# Patient Record
Sex: FEMALE | Race: WHITE | NOT HISPANIC OR LATINO | Employment: OTHER | URBAN - METROPOLITAN AREA
[De-identification: names, ages, dates, MRNs, and addresses within clinical notes are randomized per-mention and may not be internally consistent; named-entity substitution may affect disease eponyms.]

---

## 2017-03-06 ENCOUNTER — GENERIC CONVERSION - ENCOUNTER (OUTPATIENT)
Dept: OTHER | Facility: OTHER | Age: 72
End: 2017-03-06

## 2017-08-02 ENCOUNTER — GENERIC CONVERSION - ENCOUNTER (OUTPATIENT)
Dept: OTHER | Facility: OTHER | Age: 72
End: 2017-08-02

## 2017-09-27 ENCOUNTER — GENERIC CONVERSION - ENCOUNTER (OUTPATIENT)
Dept: OTHER | Facility: OTHER | Age: 72
End: 2017-09-27

## 2017-10-13 ENCOUNTER — ALLSCRIPTS OFFICE VISIT (OUTPATIENT)
Dept: OTHER | Facility: OTHER | Age: 72
End: 2017-10-13

## 2017-10-13 DIAGNOSIS — Z11.59 ENCOUNTER FOR SCREENING FOR OTHER VIRAL DISEASES (CODE): ICD-10-CM

## 2017-10-13 DIAGNOSIS — Z12.11 ENCOUNTER FOR SCREENING FOR MALIGNANT NEOPLASM OF COLON: ICD-10-CM

## 2017-10-13 DIAGNOSIS — R03.0 ELEVATED BLOOD PRESSURE READING WITHOUT DIAGNOSIS OF HYPERTENSION: ICD-10-CM

## 2017-10-13 DIAGNOSIS — E78.5 HYPERLIPIDEMIA: ICD-10-CM

## 2017-10-13 DIAGNOSIS — Z13.21 ENCOUNTER FOR SCREENING FOR NUTRITIONAL DISORDER: ICD-10-CM

## 2017-10-13 DIAGNOSIS — M54.30 SCIATICA: ICD-10-CM

## 2017-10-19 ENCOUNTER — APPOINTMENT (OUTPATIENT)
Dept: LAB | Facility: CLINIC | Age: 72
End: 2017-10-19
Payer: MEDICARE

## 2017-10-19 ENCOUNTER — TRANSCRIBE ORDERS (OUTPATIENT)
Dept: LAB | Facility: CLINIC | Age: 72
End: 2017-10-19

## 2017-10-19 DIAGNOSIS — R03.0 ELEVATED BLOOD PRESSURE READING WITHOUT DIAGNOSIS OF HYPERTENSION: ICD-10-CM

## 2017-10-19 DIAGNOSIS — Z12.11 ENCOUNTER FOR SCREENING FOR MALIGNANT NEOPLASM OF COLON: ICD-10-CM

## 2017-10-19 DIAGNOSIS — Z11.59 ENCOUNTER FOR SCREENING FOR OTHER VIRAL DISEASES (CODE): ICD-10-CM

## 2017-10-19 DIAGNOSIS — Z13.21 ENCOUNTER FOR SCREENING FOR NUTRITIONAL DISORDER: ICD-10-CM

## 2017-10-19 DIAGNOSIS — E78.5 HYPERLIPIDEMIA: ICD-10-CM

## 2017-10-19 LAB
25(OH)D3 SERPL-MCNC: 34 NG/ML (ref 30–100)
ALBUMIN SERPL BCP-MCNC: 3.9 G/DL (ref 3.5–5)
ALP SERPL-CCNC: 61 U/L (ref 46–116)
ALT SERPL W P-5'-P-CCNC: 20 U/L (ref 12–78)
ANION GAP SERPL CALCULATED.3IONS-SCNC: 5 MMOL/L (ref 4–13)
AST SERPL W P-5'-P-CCNC: 16 U/L (ref 5–45)
BACTERIA UR QL AUTO: ABNORMAL /HPF
BASOPHILS # BLD AUTO: 0.05 THOUSANDS/ΜL (ref 0–0.1)
BASOPHILS NFR BLD AUTO: 1 % (ref 0–1)
BILIRUB SERPL-MCNC: 1.79 MG/DL (ref 0.2–1)
BILIRUB UR QL STRIP: NEGATIVE
BUN SERPL-MCNC: 19 MG/DL (ref 5–25)
CALCIUM SERPL-MCNC: 9 MG/DL (ref 8.3–10.1)
CHLORIDE SERPL-SCNC: 107 MMOL/L (ref 100–108)
CHOLEST SERPL-MCNC: 196 MG/DL (ref 50–200)
CLARITY UR: CLEAR
CO2 SERPL-SCNC: 29 MMOL/L (ref 21–32)
COLOR UR: YELLOW
CREAT SERPL-MCNC: 0.81 MG/DL (ref 0.6–1.3)
EOSINOPHIL # BLD AUTO: 0.12 THOUSAND/ΜL (ref 0–0.61)
EOSINOPHIL NFR BLD AUTO: 2 % (ref 0–6)
ERYTHROCYTE [DISTWIDTH] IN BLOOD BY AUTOMATED COUNT: 12.8 % (ref 11.6–15.1)
GFR SERPL CREATININE-BSD FRML MDRD: 72 ML/MIN/1.73SQ M
GLUCOSE P FAST SERPL-MCNC: 98 MG/DL (ref 65–99)
GLUCOSE UR STRIP-MCNC: NEGATIVE MG/DL
HBV SURFACE AB SER-ACNC: 80.59 MIU/ML
HCT VFR BLD AUTO: 41.9 % (ref 34.8–46.1)
HCV AB SER QL: NORMAL
HDLC SERPL-MCNC: 97 MG/DL (ref 40–60)
HEMOCCULT STL QL IA: NEGATIVE
HGB BLD-MCNC: 13.5 G/DL (ref 11.5–15.4)
HGB UR QL STRIP.AUTO: NEGATIVE
HYALINE CASTS #/AREA URNS LPF: ABNORMAL /LPF
KETONES UR STRIP-MCNC: NEGATIVE MG/DL
LDLC SERPL CALC-MCNC: 89 MG/DL (ref 0–100)
LEUKOCYTE ESTERASE UR QL STRIP: ABNORMAL
LYMPHOCYTES # BLD AUTO: 1.01 THOUSANDS/ΜL (ref 0.6–4.47)
LYMPHOCYTES NFR BLD AUTO: 18 % (ref 14–44)
MCH RBC QN AUTO: 30.4 PG (ref 26.8–34.3)
MCHC RBC AUTO-ENTMCNC: 32.2 G/DL (ref 31.4–37.4)
MCV RBC AUTO: 94 FL (ref 82–98)
MONOCYTES # BLD AUTO: 0.34 THOUSAND/ΜL (ref 0.17–1.22)
MONOCYTES NFR BLD AUTO: 6 % (ref 4–12)
NEUTROPHILS # BLD AUTO: 4.14 THOUSANDS/ΜL (ref 1.85–7.62)
NEUTS SEG NFR BLD AUTO: 73 % (ref 43–75)
NITRITE UR QL STRIP: NEGATIVE
NON-SQ EPI CELLS URNS QL MICRO: ABNORMAL /HPF
NRBC BLD AUTO-RTO: 0 /100 WBCS
PH UR STRIP.AUTO: 6 [PH] (ref 4.5–8)
PLATELET # BLD AUTO: 161 THOUSANDS/UL (ref 149–390)
PMV BLD AUTO: 13.4 FL (ref 8.9–12.7)
POTASSIUM SERPL-SCNC: 4.1 MMOL/L (ref 3.5–5.3)
PROT SERPL-MCNC: 7.2 G/DL (ref 6.4–8.2)
PROT UR STRIP-MCNC: NEGATIVE MG/DL
RBC # BLD AUTO: 4.44 MILLION/UL (ref 3.81–5.12)
RBC #/AREA URNS AUTO: ABNORMAL /HPF
SODIUM SERPL-SCNC: 141 MMOL/L (ref 136–145)
SP GR UR STRIP.AUTO: 1.02 (ref 1–1.03)
TRIGL SERPL-MCNC: 52 MG/DL
TSH SERPL DL<=0.05 MIU/L-ACNC: 1.4 UIU/ML (ref 0.36–3.74)
UROBILINOGEN UR QL STRIP.AUTO: 0.2 E.U./DL
WBC # BLD AUTO: 5.67 THOUSAND/UL (ref 4.31–10.16)
WBC #/AREA URNS AUTO: ABNORMAL /HPF

## 2017-10-19 PROCEDURE — 36415 COLL VENOUS BLD VENIPUNCTURE: CPT

## 2017-10-19 PROCEDURE — G0328 FECAL BLOOD SCRN IMMUNOASSAY: HCPCS

## 2017-10-19 PROCEDURE — 86706 HEP B SURFACE ANTIBODY: CPT

## 2017-10-19 PROCEDURE — 84443 ASSAY THYROID STIM HORMONE: CPT

## 2017-10-19 PROCEDURE — 86803 HEPATITIS C AB TEST: CPT

## 2017-10-19 PROCEDURE — 80053 COMPREHEN METABOLIC PANEL: CPT

## 2017-10-19 PROCEDURE — 85025 COMPLETE CBC W/AUTO DIFF WBC: CPT

## 2017-10-19 PROCEDURE — 80061 LIPID PANEL: CPT

## 2017-10-19 PROCEDURE — 81001 URINALYSIS AUTO W/SCOPE: CPT

## 2017-10-19 PROCEDURE — 82306 VITAMIN D 25 HYDROXY: CPT

## 2017-11-06 ENCOUNTER — GENERIC CONVERSION - ENCOUNTER (OUTPATIENT)
Dept: OTHER | Facility: OTHER | Age: 72
End: 2017-11-06

## 2017-12-19 ENCOUNTER — TRANSCRIBE ORDERS (OUTPATIENT)
Dept: ADMINISTRATIVE | Facility: HOSPITAL | Age: 72
End: 2017-12-19

## 2017-12-19 DIAGNOSIS — Z12.31 ENCOUNTER FOR SCREENING MAMMOGRAM FOR MALIGNANT NEOPLASM OF BREAST: Primary | ICD-10-CM

## 2018-01-03 ENCOUNTER — GENERIC CONVERSION - ENCOUNTER (OUTPATIENT)
Dept: INTERNAL MEDICINE CLINIC | Facility: CLINIC | Age: 73
End: 2018-01-03

## 2018-01-09 NOTE — PROGRESS NOTES
Assessment    1  Hyperlipidemia (272 4) (E78 5)   2  Encounter for preventive health examination (V70 0) (Z00 00)    Plan  Health Maintenance    · Prevnar 13 Intramuscular Suspension (Prevnar 13 Intramuscular Suspension); INJECT 0 5  ML Intramuscular  Hyperlipidemia    · Pravastatin Sodium 10 MG Oral Tablet; TAKE 1 TABLET AT BEDTIME   · (1) LIPID PANEL FASTING W DIRECT LDL REFLEX; Status:Hold For - Exact Date; Requested for:Approx [id number];     Discussion/Summary    Chayito Recio is a pleasant woman that was seen and examined in the office today  She is overall feeling well  Examination reveals +S1/S2 regular rate/rhythm, carotid upstroke normal without bruits, lungs clear to auscultation bilaterally, and abdominal exam is unremarkable  We discussed the trend of her cholesterol panel and decided to start low dose statin  She will repeat the lipid panel in about 4 months  Otherwise no other changes were made  Impression: Subsequent Annual Wellness Visit, with preventive exam as well as age and risk appropriate counseling completed  Immunizations: influenza vaccine is up to date this year, the risks and benefits of pneumococcal vaccination were discussed with the patient, Given Prevnar script and Zostavax vaccination up to date  Advance Directive Planning: complete and up to date  Patient Discussion: plan discussed with the patient, follow-up visit needed in one year  Chief Complaint  ANNUAL PE      History of Present Illness  HPI: Chayito Recio is a very pleasant woman that comes to the office today for an annual physical  She reports feeling well overall and denies significant complaints  She continues to work as a nurse with [employer]  She reports losing her  in December secondary to pulmonary fibrosis  She overall has been coping well  Medications were updated and history was reviewed  Welcome to [employer] and Wellness Visits: The patient is being seen for the subsequent annual wellness visit     Medicare Screening and Risk Factors   Hospitalizations: no previous hospitalizations  Medicare Screening Tests Risk Questions   Drug and Alcohol Use: The patient reports drinking 5 drinks per week  She has never used illicit drugs  Diet and Physical Activity: Current diet includes well balanced meals, 1/2 servings of fruit per day, 4 servings of vegetables per day, 1 servings of meat per day, 1/2 servings of whole grains per day, 1 cups of coffee per day, 1 cups of tea per day and 1 cans of diet soda per day  Exercise: walking, TENNIS 5-6 hours per week  Mood Disorder and Cognitive Impairment Screening: PHQ-9 Depression Scale   Over the past 2 weeks, how often have you been bothered by the following problems? 1 ) Little interest or pleasure in doing things? Not at all    2 ) Feeling down, depressed or hopeless? Not at all    3 ) Trouble falling asleep or sleeping too much? Half the days or more  4 ) Feeling tired or having little energy? Not at all    5 ) Poor appetite or overeating? Not at all    6 ) Feeling bad about yourself, or that you are a failure, or have let yourself or your family down? Not at all    7 ) Trouble concentrating on things, such as reading a newspaper or watching television? Not at all    8 ) Moving or speaking so slowly that other people could have noticed, or the opposite, moving or speaking faster than usual? Not at all  TOTAL SCORE: 2  How difficult have these problems made it for you to do your work, take care of things at home, or get along with people? Not at all  She denies feeling down, depressed, or hopeless over the past two weeks  She denies feeling little interest or pleasure in doing things over the past two weeks     Cognitive impairment screening: denies difficulty learning/retaining new information, denies difficulty handling complex tasks, denies difficulty with reasoning, denies difficulty with spatial ability and orientation, denies difficulty with language and denies difficulty with behavior  Functional Ability/Level of Safety: Hearing is slightly decreased in the right ear, slightly decreased in the left ear and a hearing aid is not used  She reports hearing difficulties  Activities of daily living details: does not need help using the phone, no transportation help needed, does not need help shopping, no meal preparation help needed, does not need help doing housework, does not need help doing laundry, does not need help managing medications and does not need help managing money  Fall risk factors: The patient fell 0 times in the past 12 months  and no urinary incontinence  Home safety risk factors:  no unfamiliar surroundings, no loose rugs, no poor household lighting, no uneven floors, no household clutter, grab bars in the bathroom and handrails on the stairs  Advance Directives: Advance directives: living will, durable power of  for health care directives and advance directives  Co-Managers and Medical Equipment/Suppliers: See Patient Care Team      Patient Care Team    Care Team Member Role Specialty Office Number   Day Henao DO Specialist Orthopedic Surgery [phone]     Review of Systems    Constitutional: no fever, no chills, no malaise, no fatigue and no anorexia  Head and Face: no facial pain and no facial pressure  Eyes: no eye pain, eyes not red, no watery discharge from the eyes, no purulent discharge from the eyes, no itching of the eyes and no blurred vision  ENT: no earache, no hearing loss, no nasal congestion, no nasal discharge, no sneezing, no sore throat, no scratchy throat, no hoarseness and no white patches in the mouth  Cardiovascular: no chest pain, no palpitations, the heart is not racing, no lightheadedness and no lower extremity edema  Respiratory: no shortness of breath, no wheezing, no cough, no dry cough, no clear sputum and no colored sputum     Gastrointestinal: no abdominal pain, no abdominal bloating, no abdominal cramps, no nausea, no vomiting, no diarrhea, no constipation, no bright red blood per rectum and no melena  Genitourinary: no dysuria, no urinary frequency, no urinary urgency, no suprapubic pain and no pelvic pain  Musculoskeletal: generalized muscle aches, but no diffuse joint pain, no joint swelling, no joint stiffness, no pain in other joints and no limping  Integumentary and Breasts: negative  Neurological: negative  Psychiatric: insomnia, but no anxiety and no depression  Endocrine: negative  Hematologic and Lymphatic: negative  Active Problems    1  Elevated blood pressure reading without diagnosis of hypertension (796 2) (R03 0)   2  History of allergy (V15 09) (Z88 9)   3  Sciatica (724 3) (M54 30)   4  Seasonal allergies (477 9) (J30 2)    Family History    · Family history of Skin cancer    Social History    · Never smoker   · No drug use   · Social alcohol use (Z78 9)    Current Meds   1  Acyclovir 5 % External Ointment; APPLY AS DIRECTED; Therapy: 16Xjk6981 to (Last Rx:95Aez0590)  Requested for: 56Ghk2501 Ordered   2  Diclofenac Potassium 50 MG Oral Tablet; TAKE 1 TABLET TWICE DAILY; Therapy: 34IOI8432 to (Evaluate:84Akz0684)  Requested for: 94IHR2135; Last   Rx:19Mar2015 Ordered   3  Fluticasone Propionate 50 MCG/ACT Nasal Suspension; USE 1 SPRAY IN EACH   NOSTRIL ONCE DAILY; Therapy: 90Zgq3765 to (Last Rx:54Ljx6947)  Requested for: 00Gog2762 Ordered    Allergies    1  No Known Drug Allergies    Vitals  Signs [Data Includes: Current Encounter]    Heart Rate: 63  Systolic: 380  Diastolic: 82  Height: 5 ft 2 in  Weight: 113 lb 4 00 oz  BMI Calculated: 20 71  BSA Calculated: 1 5  O2 Saturation: 97    Physical Exam    Constitutional   General appearance: No acute distress, well appearing and well nourished  Head and Face   Head and face: Normal     Palpation of the face and sinuses: No sinus tenderness      Eyes   Conjunctiva and lids: No swelling, erythema or discharge  Pupils and irises: Equal, round, reactive to light  Ears, Nose, Mouth, and Throat   External inspection of ears and nose: Normal     Otoscopic examination: Tympanic membranes translucent with normal light reflex  Canals patent without erythema  Hearing: Normal     Lips, teeth, and gums: Normal, good dentition  Oropharynx: Normal with no erythema, edema, exudate or lesions  Neck   Neck: Supple, symmetric, trachea midline, no masses  Thyroid: Normal, no thyromegaly  Pulmonary   Respiratory effort: No increased work of breathing or signs of respiratory distress  Auscultation of lungs: Clear to auscultation  Cardiovascular   Palpation of heart: Normal PMI, no thrills  Auscultation of heart: Normal rate and rhythm, normal S1 and S2, no murmurs  Carotid pulses: 2+ bilaterally  Femoral pulses: 2+ bilaterally  Pedal pulses: 2+ bilaterally  Examination of extremities for edema and/or varicosities: Normal     Abdomen   Abdomen: Non-tender, no masses  Liver and spleen: No hepatomegaly or splenomegaly  Examination for hernias: No hernia appreciated  Lymphatic   Palpation of lymph nodes in neck: No lymphadenopathy  Palpation of lymph nodes in axillae: No lymphadenopathy  Musculoskeletal   Gait and station: Normal     Digits and nails: Normal without clubbing or cyanosis  Joints, bones, and muscles: Normal     Range of motion: Normal     Stability: Normal     Muscle strength/tone: Normal     Skin   Skin and subcutaneous tissue: Normal without rashes or lesions  Neurologic   Cranial nerves: Cranial nerves II-XII intact  Cortical function: Normal mental status  Sensation: No sensory loss  Coordination: Normal finger to nose and heel to shin  Psychiatric   Judgment and insight: Normal     Orientation to person, place, and time: Normal     Recent and remote memory: Intact      Mood and affect: Normal        Results/Data  [facility] for General CVD 05Khx2345 03:45PM Malena Evans     Test Name Result Flag Reference   [place] CVD - Ten Year 9 5 %     Sex: Female  Age: 69  Total Cholesterol: 246 mg/dL  HDL Cholesterol: 93 mg/dL  Systolic Blood Pressure: 016 mmHg  Diabetes: No  Smoking Status: No  Being treated for hypertension: No   [place] CVD - Heart Age 69 Years     [place] CVD - Normal 9 3 %       (1) BASIC METABOLIC PROFILE [id number] 07:50AM Malena Evans   TW Order Number: [id number]      National Kidney Disease Education Program recommendations are as follows:  GFR calculation is accurate only with a steady state creatinine  Chronic Kidney disease less than 60 ml/min/1 73 sq  meters  Kidney failure less than 15 ml/min/1 73 sq  meters  Test Name Result Flag Reference   GLUCOSE,RANDM 93 mg/dL     If the patient is fasting, the ADA then defines impaired fasting glucose as > 100 mg/dL and diabetes as > or equal to 123 mg/dL  SODIUM 140 mmol/L  136-145   POTASSIUM 4 1 mmol/L  3 5-5 3   CHLORIDE 104 mmol/L  100-108   CARBON DIOXIDE 29 mmol/L  21-32   ANION GAP (CALC) 7 mmol/L  4-13   BLOOD UREA NITROGEN 22 mg/dL  5-25   CREATININE 0 80 mg/dL  0 60-1 30   Standardized to IDMS reference method   CALCIUM 8 2 mg/dL L 8 3-10 1   eGFR Non-African American      >60 0 ml/min/1 73sq m     (1) CBC/PLT/DIFF 08Apr2016 07:50AM Malena Pealexandro   TW Order Number: [id number]    TW Order Number: [id number]     Test Name Result Flag Reference   WBC COUNT 4 33 Thousand/uL  4 31-10 16   RBC COUNT 4 54 Million/uL  3 81-5 12   HEMOGLOBIN 14 0 g/dL  11 5-15 4   HEMATOCRIT 41 8 %  34 8-46  1   MCV 92 fL  82-98   MCH 30 8 pg  26 8-34 3   MCHC 33 5 g/dL  31 4-37 4   RDW 12 7 %  11 6-15 1   MPV 13 0 fL H 8 9-12 7   PLATELET COUNT 469 Thousands/uL  149-390   nRBC AUTOMATED 0 /100 WBCs     NEUTROPHILS RELATIVE PERCENT 60 %  43-75   LYMPHOCYTES RELATIVE PERCENT 29 %  14-44   MONOCYTES RELATIVE PERCENT 8 %  4-12   EOSINOPHILS RELATIVE PERCENT 2 %  0-6 BASOPHILS RELATIVE PERCENT 1 %  0-1   NEUTROPHILS ABSOLUTE COUNT 2 56 Thousands/µL  1 85-7 62   LYMPHOCYTES ABSOLUTE COUNT 1 25 Thousands/µL  0 60-4 47   MONOCYTES ABSOLUTE COUNT 0 35 Thousand/µL  0 17-1 22   EOSINOPHILS ABSOLUTE COUNT 0 09 Thousand/µL  0 00-0 61   BASOPHILS ABSOLUTE COUNT 0 06 Thousands/µL  0 00-0 10     (1) LIPID PANEL, FASTING 08Apr2016 07:50AM [facility] Order Number: [id number]      Triglyceride:         Normal              <150 mg/dl       Borderline High    150-199 mg/dl       High               200-499 mg/dl       Very High          >499 mg/dl  Cholesterol:         Desirable        <200 mg/dl      Borderline High  200-239 mg/dl      High             >239 mg/dl  HDL Cholesterol:        High    >59 mg/dL      Low     <41 mg/dL     Test Name Result Flag Reference   CHOLESTEROL 246 mg/dL H    HDL,DIRECT 93 mg/dL H 40-60   LDL CHOLESTEROL CALCULATED 140 mg/dL H 0-100   TRIGLYCERIDES 64 mg/dL  <=150   Specimen collection should occur prior to N-Acetylcysteine or Metamizole administration due to the potential for falsely depressed results  (1) TSH WITH FT4 REFLEX 08Apr2016 07:50AM [facility] Order Number: [id number]    Patients undergoing fluorescein dye angiography may retain small amounts of fluorescein in the body for 48-72 hours post procedure  Samples containing fluorescein can produce falsely depressed TSH values  If the patient had this procedure,a specimen should be resubmitted post fluorescein clearance          The recommended reference ranges for TSH during pregnancy are as follows:  First trimester 0 1 to 2 5 uIU/mL  Second trimester  0 2 to 3 0 uIU/mL  Third trimester 0 3 to 3 0 uIU/m     Test Name Result Flag Reference   TSH 1 230 uIU/mL  0 358-3 740     (1) URINALYSIS (will reflex a microscopy if leukocytes, occult blood, protein or nitrites are not within normal limits) 08Apr2016 07:50AM [facility] Order Number: [id number]     Order Number: [id number]     Test Name Result Flag Reference   COLOR Yellow     CLARITY Clear     SPECIFIC GRAVITY UA 1 023  1 003-1 030   PH UA 6 0  4 5-8 0   LEUKOCYTE ESTERASE UA Small A Negative   NITRITE UA Negative  Negative   PROTEIN UA Negative mg/dl  Negative   GLUCOSE UA Negative mg/dl  Negative   KETONES UA Negative mg/dl  Negative   UROBILINOGEN UA 0 2 E U /dl  0 2, 1 0 E U /dl   BILIRUBIN UA Negative  Negative   BLOOD UA Negative  Negative   BACTERIA None Seen /hpf  None Seen, Occasional   EPITHELIAL CELLS None Seen /hpf  None Seen, Occasional   RBC UA None Seen /hpf  None Seen   WBC UA 4-10 /hpf A None Seen     (1) VITAMIN D 25-HYDROXY 08Apr2016 07:50AM Philip Allen    Order Number: [id number]    TW Order Number: [id number]     Test Name Result Flag Reference   VIT D 25-HYDROX 36 2 ng/mL  30 0-100 0       Signatures   Electronically signed by : Marianna Kumar DO;  Apr 11 2016  5:11PM EST                       (Author)

## 2018-01-10 NOTE — PROGRESS NOTES
Assessment    1  Medicare annual wellness visit, subsequent (V70 0) (Z00 00)   2  Hyperlipidemia (272 4) (E78 5)   3  Social alcohol use (Z78 9)   4  Seasonal allergies (477 9) (J30 2)   5  Generalized osteoarthritis (715 00) (M15 9)   6  Elevated BP without diagnosis of hypertension (796 2) (R03 0)   7  Encounter for vitamin deficiency screening (V77 99) (Z13 21)   8  Need for hepatitis C screening test (V73 89) (Z11 59)    Plan  Elevated BP without diagnosis of hypertension    · (1) CBC/PLT/DIFF; Status:Active; Requested for:13Oct2017;    · (1) COMPREHENSIVE METABOLIC PANEL; Status:Active; Requested for:13Oct2017;    · (1) LIPID PANEL FASTING W DIRECT LDL REFLEX; Status:Active; Requested  for:13Oct2017;    · (1) URINALYSIS (will reflex a microscopy if leukocytes, occult blood, protein or nitrites  are not within normal limits); Status:Active; Requested for:13Oct2017;   Elevated BP without diagnosis of hypertension, Hyperlipidemia    · (1) TSH WITH FT4 REFLEX; Status:Active; Requested for:13Oct2017;   Encounter for screening colonoscopy    · (1) OCCULT BLOOD, FECAL IMMUNOCHEMICAL TEST; Status:Active; Requested  for:13Oct2017;   Encounter for vitamin deficiency screening    · (1) VITAMIN D 25-HYDROXY; Status:Active; Requested for:13Oct2017;   Encounter for vitamin deficiency screening, Need for hepatitis C screening test    · (1) HEP C ANTIBODY; Status:Active; Requested MIN:[id number]; History of allergy    · Fluticasone Propionate 50 MCG/ACT Nasal Suspension (Flonase); USE 1  SPRAY IN EACH NOSTRIL ONCE DAILY  Hyperlipidemia    · Pravastatin Sodium 10 MG Oral Tablet; TAKE 1 TABLET DAILY  Need for hepatitis C screening test    · (1) HEP B SURFACE ANTIBODY; Status:Active; Requested PYX:[id number];   Needs flu shot    · Fluzone High-Dose 0 5 ML Intramuscular Suspension Prefilled Syringe    Discussion/Summary  Impression: Subsequent Annual Wellness Visit       Cardiovascular screening and counseling: due for a lipid panel    Diabetes screening and counseling: due for blood glucose  Colorectal cancer screening and counseling: due for fecal occult blood testing  Breast cancer screening and counseling: due for a screening mammogram    Abdominal aortic aneurysm screening and counseling: screening not indicated  Glaucoma screening and counseling: screening is current  HIV screening and counseling: screening not indicated  Hepatitis C Screening:  The patient agrees to Hepatitis C screening  Immunizations: influenza vaccination is recommended annually, ? prevnar, Zostavax vaccination up to date, Td vaccine needed today and will come back in 2 weeks  Advance Directive Planning: complete and up to date  Patient Discussion: follow-up visit needed in one year  Chief Complaint  annual wellness visit  History of Present Illness  Welcome to Medicare and Wellness Visits: The patient is being seen for the subsequent annual wellness visit  Medicare Screening and Risk Factors   Hospitalizations: no previous hospitalizations  Medicare Screening Tests Risk Questions   Osteoporosis risk assessment: , female gender and over 48years of age  Drug and Alcohol Use: The patient has never smoked cigarettes  The patient reports frequent alcohol use and drinking 1-6 drinks per day  Alcohol concern:   The patient has no concerns about alcohol abuse  She has never used illicit drugs  Diet and Physical Activity: Current diet includes well balanced meals, limited junk food, 2 servings of fruit per day, 3 servings of vegetables per day, 1 servings of meat per day, 1 servings of whole grains per day, 2 servings of dairy products per day and 1 cups of coffee per day  She exercises 5 times per week and paddle/raquet sports  Exercise: walking  Mood Disorder and Cognitive Impairment Screening: PHQ-9 Depression Scale   Over the past 2 weeks, how often have you been bothered by the following problems?    1 ) Little interest or pleasure in doing things? Not at all    2 ) Feeling down, depressed or hopeless? Several days  3 ) Trouble falling asleep or sleeping too much? Not at all    4 ) Feeling tired or having little energy? Not at all    5 ) Poor appetite or overeating? Not at all    6 ) Feeling bad about yourself, or that you are a failure, or have let yourself or your family down? Not at all    7 ) Trouble concentrating on things, such as reading a newspaper or watching television? Not at all    8 ) Moving or speaking so slowly that other people could have noticed, or the opposite, moving or speaking faster than usual? Not at all  TOTAL SCORE: 1  How difficult have these problems made it for you to do your work, take care of things at home, or get along with people? Somewhat difficult  She reports feeling down, depressed, or hopeless over the past two weeks  She denies feeling little interest or pleasure in doing things over the past two weeks  Cognitive impairment screening: denies difficulty learning/retaining new information, denies difficulty handling complex tasks, denies difficulty with reasoning, denies difficulty with spatial ability and orientation, denies difficulty with language and denies difficulty with behavior  Functional Ability/Level of Safety: Hearing is significantly decreased  She reports hearing difficulties  She uses a hearing aid  The patient is currently able to do activities of daily living without limitations, able to do instrumental activities of daily living without limitations, able to participate in social activities without limitations and able to drive without limitations  Activities of daily living details: does not need help using the phone, no transportation help needed, does not need help shopping, no meal preparation help needed, does not need help doing housework, does not need help doing laundry, does not need help managing medications and does not need help managing money     Fall risk factors: The patient fell 0 times in the past 12 months  Injury History: no polypharmacy, alcohol use, no mobility impairment, no antidepressant use, no deconditioning, no postural hypotension, no sedative use, no visual impairment, no urinary incontinence, no antihypertensive use, no cognitive impairment, up and go test was normal and no previous fall  Home safety risk factors:  no grab bars in the bathroom, but no unfamiliar surroundings, no loose rugs, no poor household lighting, no uneven floors, no household clutter and handrails on the stairs  Advance Directives: Advance directives: living will, durable power of  for health care directives and advance directives  Co-Managers and Medical Equipment/Suppliers: See Patient Care Team   Preventive Quality Program 65 and Older: Falls Risk: The patient fell 0 times in the past 12 months  The patient is currently asymptomatic Symptoms Include: The patient currently has no urinary incontinence symptoms  Patient Care Team    Care Team Member Role Specialty Office Number   Christie Hoyos DO Specialist Orthopedic Surgery [phone]   [facility] DPM  Podiatry [phone]     [address] [phone]   Brittnee Pratt MD  Obstetrics/Gynecology [phone]     Review of Systems    Constitutional: no fever, no chills, no malaise and no fatigue  Head and Face: no facial pressure  Eyes: no eye pain  ENT: nasal discharge and PND, but no earache and no hearing loss  Cardiovascular: no chest pain, the heart is not racing and no lower extremity edema  Respiratory: no shortness of breath and no cough  Gastrointestinal: no abdominal pain, no nausea, no vomiting and no melena  Genitourinary: no hematuria  Musculoskeletal: back pain, joint stiffness, pain in other joints and Chronic back pain occasional flare-ups, but no diffuse joint pain, no joint swelling and no limping     Integumentary and Breasts: skin lesion, but no rashes, no erythema and no edema  Neurological: no headache, no confusion, no paresthesias and no difficulty walking  Psychiatric: no anxiety and no depression  Endocrine: no night sweats and no muscle weakness  Hematologic and Lymphatic: no tendency for easy bleeding and no tendency for easy bruising  Active Problems    1  Elevated BP without diagnosis of hypertension (796 2) (R03 0)   2  Encounter for screening colonoscopy (V76 51) (Z12 11)   3  Generalized osteoarthritis (715 00) (M15 9)   4  History of allergy (V15 09) (Z88 9)   5  Hyperlipidemia (272 4) (E78 5)   6  Sciatica (724 3) (M54 30)   7  Seasonal allergies (477 9) (J30 2)    Past Medical History    The active problems and past medical history were reviewed and updated today  Surgical History    · History of Bladder Surgery   · History of Dilation And Curettage   · History of Episiotomy   · History of Oral Surgery Tooth Extraction    The surgical history was reviewed and updated today  Family History  Mother    · Family history of dementia (V17 2) (Z81 8)   · Family history of hypertension (V17 49) (Z82 49)   · Family history of Skin cancer    The family history was reviewed and updated today  Social History    · Never smoker   · No drug use   · Social alcohol use (Z78 9)  The social history was reviewed and updated today  The social history was reviewed and is unchanged  Current Meds   1  Ambien 5 MG Oral Tablet; Therapy: 13Xjl1439 to Recorded   2  Calcium 600/Vitamin D 600-400 MG-UNIT Oral Tablet; Therapy: 20Lbc8530 to Recorded   3  Fluticasone Propionate 50 MCG/ACT Nasal Suspension; USE 1 SPRAY IN EACH   NOSTRIL ONCE DAILY; Therapy: 96Zha9600 to (Last Rx:24Toy1275)  Requested for: 47Rco5111 Ordered   4  Pravastatin Sodium 10 MG Oral Tablet; TAKE 1 TABLET DAILY; Therapy: 01Rxd2038 to (Evaluate:85Sqx8215)  Requested for: 31Yfb2057; Last   Rx:[id number] Ordered   5   Prevnar 13 Intramuscular Suspension; INJECT 0 5  ML Intramuscular; Therapy: 92Rng7035 to (Last Rx:77Efn1191) Ordered    The medication list was reviewed and updated today  Allergies    1  No Known Drug Allergies    Immunizations   1    Influenza  12-Oct-2014    PPSV  12-Apr-2010    Zoster  12-Apr-2011     Vitals  Signs    Heart Rate: 63  Systolic: 675, LUE, Sitting  Diastolic: 78, LUE, Sitting  Height: 5 ft 2 in  Weight: 114 lb   BMI Calculated: 20 85  BSA Calculated: 1 51  O2 Saturation: 98    Physical Exam    Constitutional   General appearance: No acute distress, well appearing and well nourished  Head and Face   Head and face: Normal     Eyes   Conjunctiva and lids: No swelling, erythema or discharge  Pupils and irises: Equal, round, reactive to light  Ears, Nose, Mouth, and Throat   Hearing: Normal     Oropharynx: Normal with no erythema, edema, exudate or lesions  Neck   Neck: Supple, symmetric, trachea midline, no masses  Thyroid: Normal, no thyromegaly  Pulmonary   Respiratory effort: No increased work of breathing or signs of respiratory distress  Auscultation of lungs: Clear to auscultation  Cardiovascular   Auscultation of heart: Normal rate and rhythm, normal S1 and S2, no murmurs  Carotid pulses: 2+ bilaterally  Examination of extremities for edema and/or varicosities: Normal     Chest defer gyn  Abdomen   Abdomen: Non-tender, no masses  Liver and spleen: No hepatomegaly or splenomegaly  Genitourinary defer gyn  Lymphatic   Palpation of lymph nodes in neck: No lymphadenopathy  Palpation of lymph nodes in axillae: No lymphadenopathy  Musculoskeletal   Gait and station: Normal     Digits and nails: Normal without clubbing or cyanosis  Joints, bones, and muscles: Abnormal   Tightening of Achilles tendon bilaterally  Straight leg raise is negative     Range of motion: Normal     Stability: Normal     Muscle strength/tone: Normal     Neurologic   Cranial nerves: Cranial nerves II-XII intact  Cortical function: Normal mental status  Psychiatric   Judgment and insight: Normal     Orientation to person, place, and time: Normal     Recent and remote memory: Intact      Mood and affect: Normal        Results/Data  Falls Risk Assessment (Dx Z13 89 Screen for Neurologic Disorder) 13Oct2017 03:08PM User, s     Test Name Result Flag Reference   Falls Risk      No falls in the past year       Signatures   Electronically signed by : ARON Edwards ; Oct 13 2017  5:23PM EST                       (Author)

## 2018-01-12 VITALS
BODY MASS INDEX: 20.98 KG/M2 | HEIGHT: 62 IN | DIASTOLIC BLOOD PRESSURE: 78 MMHG | SYSTOLIC BLOOD PRESSURE: 132 MMHG | WEIGHT: 114 LBS | HEART RATE: 63 BPM | OXYGEN SATURATION: 98 %

## 2018-01-18 ENCOUNTER — HOSPITAL ENCOUNTER (OUTPATIENT)
Dept: MAMMOGRAPHY | Facility: MEDICAL CENTER | Age: 73
Discharge: HOME | End: 2018-01-18
Payer: MEDICARE

## 2018-01-18 ENCOUNTER — GENERIC CONVERSION - ENCOUNTER (OUTPATIENT)
Dept: OTHER | Facility: OTHER | Age: 73
End: 2018-01-18

## 2018-01-18 DIAGNOSIS — Z12.31 ENCOUNTER FOR SCREENING MAMMOGRAM FOR MALIGNANT NEOPLASM OF BREAST: ICD-10-CM

## 2018-01-18 PROCEDURE — 77067 SCR MAMMO BI INCL CAD: CPT

## 2018-02-12 ENCOUNTER — OFFICE VISIT (OUTPATIENT)
Dept: INTERNAL MEDICINE CLINIC | Facility: CLINIC | Age: 73
End: 2018-02-12
Payer: MEDICARE

## 2018-02-12 VITALS
RESPIRATION RATE: 16 BRPM | HEART RATE: 77 BPM | HEIGHT: 62 IN | SYSTOLIC BLOOD PRESSURE: 154 MMHG | WEIGHT: 112.8 LBS | OXYGEN SATURATION: 98 % | TEMPERATURE: 98.7 F | DIASTOLIC BLOOD PRESSURE: 72 MMHG | BODY MASS INDEX: 20.76 KG/M2

## 2018-02-12 DIAGNOSIS — I10 ESSENTIAL HYPERTENSION: Primary | ICD-10-CM

## 2018-02-12 PROCEDURE — 99214 OFFICE O/P EST MOD 30 MIN: CPT | Performed by: INTERNAL MEDICINE

## 2018-02-12 RX ORDER — LISINOPRIL 5 MG/1
5 TABLET ORAL DAILY
Qty: 30 TABLET | Refills: 1 | Status: SHIPPED | OUTPATIENT
Start: 2018-02-12 | End: 2018-02-12 | Stop reason: SDUPTHER

## 2018-02-12 RX ORDER — LISINOPRIL 5 MG/1
5 TABLET ORAL DAILY
Qty: 30 TABLET | Refills: 0 | Status: SHIPPED | OUTPATIENT
Start: 2018-02-12 | End: 2018-03-08 | Stop reason: SDUPTHER

## 2018-02-12 RX ORDER — FLUTICASONE PROPIONATE 50 MCG
1 SPRAY, SUSPENSION (ML) NASAL AS NEEDED
COMMUNITY
Start: 2013-09-03 | End: 2020-10-21 | Stop reason: SDUPTHER

## 2018-02-12 RX ORDER — PRAVASTATIN SODIUM 10 MG
1 TABLET ORAL 3 TIMES WEEKLY
COMMUNITY
Start: 2016-04-11 | End: 2018-10-17 | Stop reason: SDUPTHER

## 2018-02-12 NOTE — PROGRESS NOTES
Assessment/Plan:    No problem-specific Assessment & Plan notes found for this encounter  Diagnoses and all orders for this visit:    Essential hypertension  -     Discontinue: lisinopril (ZESTRIL) 5 mg tablet; Take 1 tablet (5 mg total) by mouth daily  -     Basic metabolic panel; Future  -     lisinopril (ZESTRIL) 5 mg tablet; Take 1 tablet (5 mg total) by mouth daily    Other orders  -     pravastatin (PRAVACHOL) 10 mg tablet; Take 1 tablet by mouth daily  -     fluticasone (FLONASE) 50 mcg/act nasal spray; 1 spray into each nostril daily  -     Calcium Carbonate-Vitamin D3 (CALCIUM 600/VITAMIN D) 600-400 MG-UNIT TABS; Take by mouth      Repeat blood pressure was 154/68  With it being persistently elevated, we discussed starting a low dose anti-hypertensive  She is going to start Lisinopril 5 mg daily  She will complete a BMP in about 2 weeks and call with BP readings  Her lipid profile largely is okay  HDL readings have always been high so we did discuss that she may cut back on the statin a bit if she would like  No other changes were made  Subjective:      Patient ID: Jaqueline Poole is a 67 y o  female  Javy Jas is here today to discuss her blood pressure  She reports that she has noticed that it has been relatively elevated over the last 2 months or so  She has been having an issue with her back and has been seeing a chiropractor  Along with this, she does report increased NSAID use  She reports the blood pressure ranges from systolic readings of 195I to the 150s  See below  Hypertension   This is a new problem  The current episode started more than 1 month ago  The problem is unchanged  Pertinent negatives include no chest pain, headaches, palpitations, peripheral edema or shortness of breath  Agents associated with hypertension include NSAIDs  Risk factors for coronary artery disease include smoking/tobacco exposure and family history  Past treatments include nothing   There is no history of angina, kidney disease, CAD/MI, heart failure, renovascular disease or a thyroid problem  There is no history of chronic renal disease  The following portions of the patient's history were reviewed and updated as appropriate: past family history, past medical history, past surgical history and problem list     Review of Systems   Respiratory: Negative for shortness of breath  Cardiovascular: Negative for chest pain and palpitations  Neurological: Negative for headaches  Objective:    Vitals:    02/12/18 1645   BP: 154/72   Pulse:    Resp:    Temp:    SpO2:         Physical Exam   Constitutional: She is oriented to person, place, and time  She appears well-developed and well-nourished  No distress  HENT:   Head: Normocephalic and atraumatic  Eyes: Conjunctivae are normal    Neck: No thyromegaly present  Cardiovascular: Normal rate, regular rhythm and normal heart sounds  Pulmonary/Chest: Effort normal and breath sounds normal  No respiratory distress  She has no wheezes  Abdominal: Soft  Bowel sounds are normal  There is no tenderness  Lymphadenopathy:     She has no cervical adenopathy  Neurological: She is alert and oriented to person, place, and time  No cranial nerve deficit  Skin: Skin is warm and dry  No rash noted  She is not diaphoretic  No erythema  Psychiatric: She has a normal mood and affect   Her speech is normal and behavior is normal  Thought content normal

## 2018-02-21 ENCOUNTER — TRANSCRIBE ORDERS (OUTPATIENT)
Dept: LAB | Facility: CLINIC | Age: 73
End: 2018-02-21

## 2018-02-21 ENCOUNTER — APPOINTMENT (OUTPATIENT)
Dept: LAB | Facility: CLINIC | Age: 73
End: 2018-02-21
Payer: MEDICARE

## 2018-02-21 DIAGNOSIS — I10 ESSENTIAL HYPERTENSION: ICD-10-CM

## 2018-02-21 LAB
ANION GAP SERPL CALCULATED.3IONS-SCNC: 5 MMOL/L (ref 4–13)
BUN SERPL-MCNC: 22 MG/DL (ref 5–25)
CALCIUM SERPL-MCNC: 9.7 MG/DL (ref 8.3–10.1)
CHLORIDE SERPL-SCNC: 106 MMOL/L (ref 100–108)
CO2 SERPL-SCNC: 30 MMOL/L (ref 21–32)
CREAT SERPL-MCNC: 0.75 MG/DL (ref 0.6–1.3)
GFR SERPL CREATININE-BSD FRML MDRD: 79 ML/MIN/1.73SQ M
GLUCOSE P FAST SERPL-MCNC: 98 MG/DL (ref 65–99)
POTASSIUM SERPL-SCNC: 4.3 MMOL/L (ref 3.5–5.3)
SODIUM SERPL-SCNC: 141 MMOL/L (ref 136–145)

## 2018-02-21 PROCEDURE — 80048 BASIC METABOLIC PNL TOTAL CA: CPT

## 2018-02-21 PROCEDURE — 36415 COLL VENOUS BLD VENIPUNCTURE: CPT

## 2018-02-27 ENCOUNTER — OFFICE VISIT (OUTPATIENT)
Dept: INTERNAL MEDICINE CLINIC | Facility: CLINIC | Age: 73
End: 2018-02-27
Payer: MEDICARE

## 2018-02-27 VITALS
OXYGEN SATURATION: 99 % | RESPIRATION RATE: 16 BRPM | HEIGHT: 62 IN | SYSTOLIC BLOOD PRESSURE: 118 MMHG | BODY MASS INDEX: 20.98 KG/M2 | HEART RATE: 55 BPM | TEMPERATURE: 97.6 F | WEIGHT: 114 LBS | DIASTOLIC BLOOD PRESSURE: 62 MMHG

## 2018-02-27 DIAGNOSIS — E78.5 HYPERLIPIDEMIA, UNSPECIFIED HYPERLIPIDEMIA TYPE: ICD-10-CM

## 2018-02-27 DIAGNOSIS — I10 ESSENTIAL HYPERTENSION: Primary | ICD-10-CM

## 2018-02-27 PROCEDURE — 99213 OFFICE O/P EST LOW 20 MIN: CPT | Performed by: INTERNAL MEDICINE

## 2018-02-27 NOTE — PROGRESS NOTES
Assessment/Plan:    No problem-specific Assessment & Plan notes found for this encounter  Diagnoses and all orders for this visit:    Essential hypertension    Hyperlipidemia, unspecified hyperlipidemia type  -     Lipid panel  -     Comprehensive metabolic panel      Blood pressure appears to be improved on the Lisinopril  She will continue this for now  In terms of the cholesterol, she is taking the pravastatin 3 x a week and will check a panel in the summer as well  Otherwise no other changes were made  Subjective:      Patient ID: Malcom Lovell is a 67 y o  female  Mckenna Leung is here today for a follow up for her blood pressure  At the last visit, we started Lisinopril secondary to elevated blood pressure  She has been monitoring it at home and it tends to run slightly better but does note readings of 945M systolic  She denies any side effects or other related complaints  Hypertension   This is a new problem  The current episode started more than 1 month ago  The problem has been gradually improving since onset  The problem is controlled  Associated symptoms include anxiety  Pertinent negatives include no blurred vision, chest pain, headaches, palpitations, peripheral edema or shortness of breath  Agents associated with hypertension include NSAIDs  Risk factors for coronary artery disease include dyslipidemia and stress  Past treatments include ACE inhibitors  The current treatment provides moderate improvement  There are no compliance problems  There is no history of kidney disease, heart failure or left ventricular hypertrophy  There is no history of chronic renal disease or a hypertension causing med  The following portions of the patient's history were reviewed and updated as appropriate: current medications, past family history, past medical history, past social history, past surgical history and problem list     Review of Systems   Eyes: Negative for blurred vision     Respiratory: Negative for shortness of breath  Cardiovascular: Negative for chest pain and palpitations  Neurological: Negative for headaches  Objective:      /62   Pulse 55   Temp 97 6 °F (36 4 °C) (Tympanic)   Resp 16   Ht 5' 1 75" (1 568 m)   Wt 51 7 kg (114 lb)   SpO2 99%   BMI 21 02 kg/m²          Physical Exam   Constitutional: She is oriented to person, place, and time  She appears well-developed and well-nourished  No distress  HENT:   Head: Normocephalic and atraumatic  Eyes: Conjunctivae are normal    Cardiovascular: Normal rate, regular rhythm and normal heart sounds  No murmur heard  Pulmonary/Chest: Effort normal and breath sounds normal    Musculoskeletal: Normal range of motion  Neurological: She is alert and oriented to person, place, and time  Skin: Skin is warm and dry  She is not diaphoretic  Psychiatric: She has a normal mood and affect  Her behavior is normal  Judgment and thought content normal    Vitals reviewed

## 2018-03-08 ENCOUNTER — TELEPHONE (OUTPATIENT)
Dept: INTERNAL MEDICINE CLINIC | Facility: CLINIC | Age: 73
End: 2018-03-08

## 2018-03-08 DIAGNOSIS — I10 ESSENTIAL HYPERTENSION: ICD-10-CM

## 2018-03-08 RX ORDER — LISINOPRIL 5 MG/1
5 TABLET ORAL DAILY
Qty: 90 TABLET | Refills: 2 | Status: SHIPPED | OUTPATIENT
Start: 2018-03-08 | End: 2018-10-17 | Stop reason: SDUPTHER

## 2018-04-05 ENCOUNTER — DOCUMENTATION (OUTPATIENT)
Dept: INTERNAL MEDICINE CLINIC | Facility: CLINIC | Age: 73
End: 2018-04-05

## 2018-08-14 ENCOUNTER — APPOINTMENT (OUTPATIENT)
Dept: LAB | Facility: CLINIC | Age: 73
End: 2018-08-14
Payer: MEDICARE

## 2018-08-14 LAB
ALBUMIN SERPL BCP-MCNC: 4 G/DL (ref 3.5–5)
ALP SERPL-CCNC: 56 U/L (ref 46–116)
ALT SERPL W P-5'-P-CCNC: 23 U/L (ref 12–78)
ANION GAP SERPL CALCULATED.3IONS-SCNC: 6 MMOL/L (ref 4–13)
AST SERPL W P-5'-P-CCNC: 20 U/L (ref 5–45)
BILIRUB SERPL-MCNC: 1.84 MG/DL (ref 0.2–1)
BUN SERPL-MCNC: 21 MG/DL (ref 5–25)
CALCIUM SERPL-MCNC: 9.2 MG/DL (ref 8.3–10.1)
CHLORIDE SERPL-SCNC: 106 MMOL/L (ref 100–108)
CHOLEST SERPL-MCNC: 217 MG/DL (ref 50–200)
CO2 SERPL-SCNC: 28 MMOL/L (ref 21–32)
CREAT SERPL-MCNC: 0.85 MG/DL (ref 0.6–1.3)
GFR SERPL CREATININE-BSD FRML MDRD: 68 ML/MIN/1.73SQ M
GLUCOSE P FAST SERPL-MCNC: 94 MG/DL (ref 65–99)
HDLC SERPL-MCNC: 82 MG/DL (ref 40–60)
LDLC SERPL CALC-MCNC: 120 MG/DL (ref 0–100)
NONHDLC SERPL-MCNC: 135 MG/DL
POTASSIUM SERPL-SCNC: 4.1 MMOL/L (ref 3.5–5.3)
PROT SERPL-MCNC: 7.3 G/DL (ref 6.4–8.2)
SODIUM SERPL-SCNC: 140 MMOL/L (ref 136–145)
TRIGL SERPL-MCNC: 77 MG/DL

## 2018-08-14 PROCEDURE — 80053 COMPREHEN METABOLIC PANEL: CPT | Performed by: INTERNAL MEDICINE

## 2018-08-14 PROCEDURE — 36415 COLL VENOUS BLD VENIPUNCTURE: CPT | Performed by: INTERNAL MEDICINE

## 2018-08-14 PROCEDURE — 80061 LIPID PANEL: CPT | Performed by: INTERNAL MEDICINE

## 2018-10-04 ENCOUNTER — OFFICE VISIT (OUTPATIENT)
Dept: INTERNAL MEDICINE CLINIC | Facility: CLINIC | Age: 73
End: 2018-10-04
Payer: MEDICARE

## 2018-10-04 VITALS
TEMPERATURE: 98.2 F | HEART RATE: 60 BPM | SYSTOLIC BLOOD PRESSURE: 118 MMHG | DIASTOLIC BLOOD PRESSURE: 70 MMHG | WEIGHT: 113.8 LBS | OXYGEN SATURATION: 99 % | HEIGHT: 62 IN | BODY MASS INDEX: 20.94 KG/M2

## 2018-10-04 DIAGNOSIS — Z23 FLU VACCINE NEED: ICD-10-CM

## 2018-10-04 DIAGNOSIS — I10 ESSENTIAL HYPERTENSION: Primary | ICD-10-CM

## 2018-10-04 DIAGNOSIS — Z00.00 MEDICARE ANNUAL WELLNESS VISIT, SUBSEQUENT: ICD-10-CM

## 2018-10-04 DIAGNOSIS — E55.9 VITAMIN D DEFICIENCY: ICD-10-CM

## 2018-10-04 PROCEDURE — 90662 IIV NO PRSV INCREASED AG IM: CPT

## 2018-10-04 PROCEDURE — G0008 ADMIN INFLUENZA VIRUS VAC: HCPCS

## 2018-10-04 PROCEDURE — G0439 PPPS, SUBSEQ VISIT: HCPCS | Performed by: INTERNAL MEDICINE

## 2018-10-04 NOTE — PROGRESS NOTES
Assessment and Plan:  Problem List Items Addressed This Visit     Essential hypertension - Primary    Relevant Orders    TSH, 3rd generation    CBC and differential      Other Visit Diagnoses     Vitamin D deficiency        Relevant Orders    Vitamin D 25 hydroxy    Flu vaccine need        Relevant Orders    influenza vaccine, [id number], high-dose, PF 0 5 mL, for patients 65 yr+ (FLUZONE HIGH-DOSE) (Completed)    Medicare annual wellness visit, subsequent            Health Maintenance Due   Topic Date Due    DTaP,Tdap,and Td Vaccines (1 - Tdap) 05[birth year]/1965    Urinary Incontinence Screening  05/15/2009         HPI:  Patient Active Problem List   Diagnosis    Essential hypertension    Hyperlipidemia    Generalized osteoarthritis    Sciatica    Seasonal allergies     No past medical history on file  Past Surgical History:   Procedure Laterality Date    BLADDER SURGERY      LAST ASSESSED [id number]    DILATION AND CURETTAGE OF UTERUS      LAST ASSESSED [id number]    TOOTH EXTRACTION      LAST ASSESSED [id number]     Family History   Problem Relation Age of Onset   [employer] Dementia Mother     Hypertension Mother     Skin cancer Mother      History   Smoking Status    Never Smoker   Smokeless Tobacco    Never Used     History   Alcohol Use    Yes     Comment: social       History   Drug Use No         Current Outpatient Prescriptions   Medication Sig Dispense Refill    Calcium Carbonate-Vitamin D3 (CALCIUM 600/VITAMIN D) 600-400 MG-UNIT TABS Take by mouth      fluticasone (FLONASE) 50 mcg/act nasal spray 1 spray into each nostril daily      lisinopril (ZESTRIL) 5 mg tablet Take 1 tablet (5 mg total) by mouth daily 90 tablet 2    pravastatin (PRAVACHOL) 10 mg tablet Take 1 tablet by mouth 3 (three) times a week         No current facility-administered medications for this visit        Allergies   Allergen Reactions    Erythromycin Rash     Immunization History   Administered Date(s) Administered    Influenza 10/12/2014    Influenza Split High Dose Preservative Free IM 10/12/2014, 10/13/2017    Influenza, high dose seasonal 0 5 mL 10/04/2018    Pneumococcal Conjugate 13-Valent 01/21/2018    Pneumococcal Polysaccharide PPV23 04/12/2010    Zoster 04/12/2011       Patient Care Team:  Renan Muñoz MD as PCP - General  Maite Dickinson, DO    Medicare Screening Tests and Risk Assessments:  Samantha North is here for her Welcome to [place] and Subsequent Wellness visit  Last Medicare Wellness visit information reviewed, patient interviewed and updates made to the record today  Health Risk Assessment:  Patient rates overall health as very good  Patient feels that their physical health rating is Same  Eyesight was rated as Same  Hearing was rated as Slightly worse  Patient feels that their emotional and mental health rating is Same  Pain experienced by patient in the last 7 days has been Some  Patient's pain rating has been 3/10  Patient states that she has experienced no weight loss or gain in last 6 months  Emotional/Mental Health:  Patient has been feeling nervous/anxious  PHQ-9 Depression Screening:    Frequency of the following problems over the past two weeks:      1  Little interest or pleasure in doing things: 0 - not at all      2  Feeling down, depressed, or hopeless: 1 - several days  PHQ-2 Score: 1          Broken Bones/Falls: Fall Risk Assessment:    In the past year, patient has experienced: No history of falling in past year          Bladder/Bowel:  Patient has not leaked urine accidently in the last six months  Patient reports no loss of bowel control  Immunizations:  Patient has had a flu vaccination within the last year  Patient has received a pneumonia shot  Patient has received a shingles shot  Home Safety:  Patient does not have trouble with stairs inside or outside of their home     Patient currently reports that there are no safety hazards present in home, working smoke alarms, working carbon monoxide detectors  Preventative Screenings:   Breast cancer screening performed, cholesterol screen completed, glaucoma eye exam completed,     Nutrition:  Current diet: Regular and Limited junk food with servings of the following:    Medications:  Patient is able to manage medications  Lifestyle Choices:  Patient reports no tobacco use  Patient has not smoked or used tobacco in the past   Patient reports alcohol use  Patient drives a vehicle  Patient wears seat belt  Activities of Daily Living:  Can get out of bed by his or her self, able to dress self, able to make own meals, able to do own shopping, able to bathe self, can do own laundry/housekeeping, can manage own money, pay bills and track expenses    Previous Hospitalizations:  No hospitalization or ED visit in past 12 months        Advanced Directives:  Patient has decided on a power of   Patient has spoken to designated power of   Patient has completed advanced directive  No exam data present    Physical Exam:  Review of Systems   Gastrointestinal: Negative for bowel incontinence  Psychiatric/Behavioral: The patient is nervous/anxious  Vitals:    10/04/18 0820   Pulse: 60   Temp: 98 2 °F (36 8 °C)   TempSrc: Tympanic   SpO2: 99%   Weight: 51 6 kg (113 lb 12 8 oz)   Height: 5' 1 75" (1 568 m)   Body mass index is 20 98 kg/m²      Physical Exam

## 2018-10-04 NOTE — PROGRESS NOTES
Assessment/Plan:    No problem-specific Assessment & Plan notes found for this encounter  Diagnoses and all orders for this visit:    Essential hypertension  -     TSH, 3rd generation; Future  -     CBC and differential; Future    Medicare annual wellness visit, subsequent    Vitamin D deficiency  -     Vitamin D 25 hydroxy; Future    Flu vaccine need  -     influenza vaccine, 5023-2200, high-dose, PF 0 5 mL, for patients 65 yr+ (FLUZONE HIGH-DOSE)          Subjective:      Patient ID:  Rachael Ventura is here today for periodic health appraisal    is a 75 y o  female  HPI        Rachael Ventura is here today for periodic health appraisal she generally feels well she is up-to-date with the gynecologist she had a mammogram done at a reasonable time she had a colonoscopy done by Dr Duncan Wolfe in   She will need a flu shot today and we did talk to her about the new shingles vaccine  Her  is now  her dad has a daughter gone through a divorce  She has had some musculoskeletal problems and has had imaging of her back and Achilles heel and has been under the care of multiple orthopedic people podiatrist's and pain specialist   He does have some spinal stenosis and problem and has what appears to be in acute a healing partial tear of the right Achilles tendon  She is now given up tennis pretty much however is an avid hiker  The following portions of the patient's history were reviewed and updated as appropriate: allergies, current medications, past family history, past medical history, past social history, past surgical history and problem list     Review of Systems  noncontributory except for the musculoskeletal problems      Objective:      /70   Pulse 60   Temp 98 2 °F (36 8 °C) (Tympanic)   Ht 5' 1 75" (1 568 m)   Wt 51 6 kg (113 lb 12 8 oz)   SpO2 99%   BMI 20 98 kg/m²          Physical Exam  she appears thin treatment athletic and in no distress her pressure is 118/70 detailed examination of the head ears eyes nose and throat neck heart lungs abdomen extremities peripheral pulses reflexes and sensation within normal limits  She does have an enlarged Achilles tendon which is visible and palpable on the right  Breast examination is declined by the patient  Will give her flu shot today she is on pravastatin 10 mg 3 times a week which was satisfactory 5 mg of lisinopril for the blood pressure  We did again give her a note read about the new shingles vaccine    Will order CBC TSH and he and vitamin-D which she is due for she had some other lab studies in August which were satisfactory if all is well will see her in a year

## 2018-10-15 ENCOUNTER — APPOINTMENT (OUTPATIENT)
Dept: LAB | Facility: CLINIC | Age: 73
End: 2018-10-15
Payer: MEDICARE

## 2018-10-15 ENCOUNTER — TELEPHONE (OUTPATIENT)
Dept: INTERNAL MEDICINE CLINIC | Facility: CLINIC | Age: 73
End: 2018-10-15

## 2018-10-15 ENCOUNTER — CLINICAL SUPPORT (OUTPATIENT)
Dept: INTERNAL MEDICINE CLINIC | Facility: CLINIC | Age: 73
End: 2018-10-15
Payer: MEDICARE

## 2018-10-15 VITALS — TEMPERATURE: 97.8 F

## 2018-10-15 DIAGNOSIS — Z23 NEED FOR TDAP VACCINATION: Primary | ICD-10-CM

## 2018-10-15 DIAGNOSIS — E55.9 VITAMIN D DEFICIENCY: ICD-10-CM

## 2018-10-15 DIAGNOSIS — I10 ESSENTIAL HYPERTENSION: ICD-10-CM

## 2018-10-15 DIAGNOSIS — E78.5 HYPERLIPIDEMIA, UNSPECIFIED HYPERLIPIDEMIA TYPE: ICD-10-CM

## 2018-10-15 LAB
25(OH)D3 SERPL-MCNC: 24.6 NG/ML (ref 30–100)
BASOPHILS # BLD AUTO: 0.04 THOUSANDS/ΜL (ref 0–0.1)
BASOPHILS NFR BLD AUTO: 1 % (ref 0–1)
EOSINOPHIL # BLD AUTO: 0.1 THOUSAND/ΜL (ref 0–0.61)
EOSINOPHIL NFR BLD AUTO: 2 % (ref 0–6)
ERYTHROCYTE [DISTWIDTH] IN BLOOD BY AUTOMATED COUNT: 12.5 % (ref 11.6–15.1)
HCT VFR BLD AUTO: 40.4 % (ref 34.8–46.1)
HGB BLD-MCNC: 12.9 G/DL (ref 11.5–15.4)
IMM GRANULOCYTES # BLD AUTO: 0.01 THOUSAND/UL (ref 0–0.2)
IMM GRANULOCYTES NFR BLD AUTO: 0 % (ref 0–2)
LYMPHOCYTES # BLD AUTO: 1.13 THOUSANDS/ΜL (ref 0.6–4.47)
LYMPHOCYTES NFR BLD AUTO: 25 % (ref 14–44)
MCH RBC QN AUTO: 30.9 PG (ref 26.8–34.3)
MCHC RBC AUTO-ENTMCNC: 31.9 G/DL (ref 31.4–37.4)
MCV RBC AUTO: 97 FL (ref 82–98)
MONOCYTES # BLD AUTO: 0.32 THOUSAND/ΜL (ref 0.17–1.22)
MONOCYTES NFR BLD AUTO: 7 % (ref 4–12)
NEUTROPHILS # BLD AUTO: 2.87 THOUSANDS/ΜL (ref 1.85–7.62)
NEUTS SEG NFR BLD AUTO: 65 % (ref 43–75)
NRBC BLD AUTO-RTO: 0 /100 WBCS
PLATELET # BLD AUTO: 172 THOUSANDS/UL (ref 149–390)
PMV BLD AUTO: 13.5 FL (ref 8.9–12.7)
RBC # BLD AUTO: 4.18 MILLION/UL (ref 3.81–5.12)
TSH SERPL DL<=0.05 MIU/L-ACNC: 0.88 UIU/ML (ref 0.36–3.74)
WBC # BLD AUTO: 4.47 THOUSAND/UL (ref 4.31–10.16)

## 2018-10-15 PROCEDURE — 90471 IMMUNIZATION ADMIN: CPT

## 2018-10-15 PROCEDURE — 84443 ASSAY THYROID STIM HORMONE: CPT

## 2018-10-15 PROCEDURE — 82306 VITAMIN D 25 HYDROXY: CPT

## 2018-10-15 PROCEDURE — 36415 COLL VENOUS BLD VENIPUNCTURE: CPT

## 2018-10-15 PROCEDURE — 85025 COMPLETE CBC W/AUTO DIFF WBC: CPT

## 2018-10-15 PROCEDURE — 90715 TDAP VACCINE 7 YRS/> IM: CPT

## 2018-10-15 RX ORDER — PRAVASTATIN SODIUM 10 MG
10 TABLET ORAL 3 TIMES WEEKLY
Qty: 15 TABLET | Refills: 3 | Status: CANCELLED | OUTPATIENT
Start: 2018-10-15

## 2018-10-17 DIAGNOSIS — I10 ESSENTIAL HYPERTENSION: ICD-10-CM

## 2018-10-17 DIAGNOSIS — E78.5 HYPERLIPIDEMIA, UNSPECIFIED HYPERLIPIDEMIA TYPE: Primary | ICD-10-CM

## 2018-10-17 RX ORDER — PRAVASTATIN SODIUM 10 MG
10 TABLET ORAL 3 TIMES WEEKLY
Qty: 90 TABLET | Refills: 1 | Status: SHIPPED | OUTPATIENT
Start: 2018-10-17 | End: 2019-10-15 | Stop reason: SDUPTHER

## 2018-10-17 RX ORDER — LISINOPRIL 5 MG/1
5 TABLET ORAL DAILY
Qty: 90 TABLET | Refills: 0 | Status: SHIPPED | OUTPATIENT
Start: 2018-10-17 | End: 2019-03-18 | Stop reason: SDUPTHER

## 2018-10-17 RX ORDER — PRAVASTATIN SODIUM 10 MG
TABLET ORAL
Qty: 90 TABLET | Refills: 0 | OUTPATIENT
Start: 2018-10-17

## 2018-10-17 NOTE — TELEPHONE ENCOUNTER
From: Wilver Whitt  Sent: 10/17/2018 3:31 PM EDT  Subject: Medication Renewal Request    Nithinbrittany Jose Eduardo would like a refill of the following medications:     lisinopril (ZESTRIL) 5 mg tablet Rosiland Fat, DO]   Patient Comment: expiration of refill req refill 10/15/18    Preferred pharmacy: [facility] [phone]

## 2018-12-01 DIAGNOSIS — I10 ESSENTIAL HYPERTENSION: ICD-10-CM

## 2018-12-03 RX ORDER — LISINOPRIL 5 MG/1
TABLET ORAL
Qty: 90 TABLET | Refills: 0 | OUTPATIENT
Start: 2018-12-03

## 2019-01-21 ENCOUNTER — OFFICE VISIT (OUTPATIENT)
Dept: INTERNAL MEDICINE CLINIC | Facility: CLINIC | Age: 74
End: 2019-01-21
Payer: MEDICARE

## 2019-01-21 DIAGNOSIS — R05.9 COUGH: Primary | ICD-10-CM

## 2019-01-21 PROCEDURE — 99213 OFFICE O/P EST LOW 20 MIN: CPT | Performed by: INTERNAL MEDICINE

## 2019-01-21 RX ORDER — ALBUTEROL SULFATE 90 UG/1
2 AEROSOL, METERED RESPIRATORY (INHALATION) EVERY 6 HOURS PRN
Qty: 1 INHALER | Refills: 0 | Status: SHIPPED | OUTPATIENT
Start: 2019-01-21 | End: 2019-10-15 | Stop reason: ALTCHOICE

## 2019-01-21 NOTE — PROGRESS NOTES
Assessment/Plan:    No problem-specific Assessment & Plan notes found for this encounter  There are no diagnoses linked to this encounter  Subjective:      Patient ID: Mike Henderson is a 75 y o  female  HPI   Alejandra Montoya is here today because of a cough she has had the cough for couple of weeks started out as a typical URI and with the fever malaise and cough productive of clear sputum  The sputum is gone away as has the fever but she still has a residual paroxysmal cough  She just returned from a trip to [place] did some hiking there was able to go 3 miles up in 3 miles down some type of a [place] there is no sputum production is she otherwise feels active and vigorous    The following portions of the patient's history were reviewed and updated as appropriate: allergies, current medications, past family history, past medical history, past social history, past surgical history and problem list     Review of Systems      Objective: There were no vitals taken for this visit  Physical Exam  blood pressure today was 136/70 tympanic membranes canals and pharynx are normal the lungs are entirely clear to percussion auscultation even with forced expiration the I think that she has a residual persistent cough because of the URI that has been prev 18 the community  I do not think this is likely to be an ACE-inhibitor cough    However going to suggest an albuterol inhaler and some watchful waiting chest x-ray appears not indicated at this time

## 2019-03-18 DIAGNOSIS — I10 ESSENTIAL HYPERTENSION: ICD-10-CM

## 2019-03-18 RX ORDER — LISINOPRIL 5 MG/1
5 TABLET ORAL DAILY
Qty: 90 TABLET | Refills: 1 | Status: SHIPPED | OUTPATIENT
Start: 2019-03-18 | End: 2019-08-23 | Stop reason: SDUPTHER

## 2019-05-07 ENCOUNTER — TELEPHONE (OUTPATIENT)
Dept: INTERNAL MEDICINE CLINIC | Facility: CLINIC | Age: 74
End: 2019-05-07

## 2019-05-07 DIAGNOSIS — Z78.9 MEASLES, MUMPS, RUBELLA (MMR) VACCINATION STATUS UNKNOWN: Primary | ICD-10-CM

## 2019-05-14 ENCOUNTER — APPOINTMENT (OUTPATIENT)
Dept: LAB | Facility: MEDICAL CENTER | Age: 74
End: 2019-05-14
Payer: MEDICARE

## 2019-05-14 DIAGNOSIS — Z78.9 MEASLES, MUMPS, RUBELLA (MMR) VACCINATION STATUS UNKNOWN: ICD-10-CM

## 2019-05-14 LAB — RUBV IGG SERPL IA-ACNC: >175 IU/ML

## 2019-05-14 PROCEDURE — 36415 COLL VENOUS BLD VENIPUNCTURE: CPT

## 2019-05-14 PROCEDURE — 86762 RUBELLA ANTIBODY: CPT

## 2019-05-14 PROCEDURE — 86735 MUMPS ANTIBODY: CPT

## 2019-05-14 PROCEDURE — 86765 RUBEOLA ANTIBODY: CPT

## 2019-05-15 LAB — RUBV IGM SER IA-ACNC: <20 AU/ML (ref 0–19.9)

## 2019-05-16 ENCOUNTER — TELEPHONE (OUTPATIENT)
Dept: INTERNAL MEDICINE CLINIC | Facility: CLINIC | Age: 74
End: 2019-05-16

## 2019-05-16 LAB
MEV IGG SER QL: NORMAL
MUV IGG SER QL: NORMAL

## 2019-05-17 LAB — MUV IGM SER QL: <0.8 AU (ref 0–0.79)

## 2019-05-30 ENCOUNTER — TELEPHONE (OUTPATIENT)
Dept: INTERNAL MEDICINE CLINIC | Facility: CLINIC | Age: 74
End: 2019-05-30

## 2019-06-06 ENCOUNTER — TELEPHONE (OUTPATIENT)
Dept: INTERNAL MEDICINE CLINIC | Facility: CLINIC | Age: 74
End: 2019-06-06

## 2019-08-23 DIAGNOSIS — I10 ESSENTIAL HYPERTENSION: ICD-10-CM

## 2019-08-23 RX ORDER — LISINOPRIL 5 MG/1
TABLET ORAL
Qty: 90 TABLET | Refills: 0 | Status: SHIPPED | OUTPATIENT
Start: 2019-08-23 | End: 2019-08-27 | Stop reason: SDUPTHER

## 2019-08-27 DIAGNOSIS — I10 ESSENTIAL HYPERTENSION: ICD-10-CM

## 2019-08-27 RX ORDER — LISINOPRIL 5 MG/1
5 TABLET ORAL DAILY
Qty: 90 TABLET | Refills: 0 | Status: SHIPPED | OUTPATIENT
Start: 2019-08-27 | End: 2020-01-06 | Stop reason: SDUPTHER

## 2019-10-15 ENCOUNTER — OFFICE VISIT (OUTPATIENT)
Dept: INTERNAL MEDICINE CLINIC | Facility: CLINIC | Age: 74
End: 2019-10-15
Payer: MEDICARE

## 2019-10-15 DIAGNOSIS — Z12.31 SCREENING MAMMOGRAM, ENCOUNTER FOR: ICD-10-CM

## 2019-10-15 DIAGNOSIS — E78.5 HYPERLIPIDEMIA, UNSPECIFIED HYPERLIPIDEMIA TYPE: ICD-10-CM

## 2019-10-15 DIAGNOSIS — Z78.0 POST-MENOPAUSAL: ICD-10-CM

## 2019-10-15 DIAGNOSIS — Z00.00 MEDICARE ANNUAL WELLNESS VISIT, SUBSEQUENT: ICD-10-CM

## 2019-10-15 DIAGNOSIS — Z23 FLU VACCINE NEED: ICD-10-CM

## 2019-10-15 DIAGNOSIS — M76.60 ACHILLES TENDINITIS, UNSPECIFIED LATERALITY: ICD-10-CM

## 2019-10-15 DIAGNOSIS — I10 ESSENTIAL HYPERTENSION: Primary | ICD-10-CM

## 2019-10-15 DIAGNOSIS — E55.9 VITAMIN D DEFICIENCY: ICD-10-CM

## 2019-10-15 PROCEDURE — G0008 ADMIN INFLUENZA VIRUS VAC: HCPCS | Performed by: INTERNAL MEDICINE

## 2019-10-15 PROCEDURE — G0439 PPPS, SUBSEQ VISIT: HCPCS | Performed by: INTERNAL MEDICINE

## 2019-10-15 PROCEDURE — 99214 OFFICE O/P EST MOD 30 MIN: CPT | Performed by: INTERNAL MEDICINE

## 2019-10-15 PROCEDURE — 90662 IIV NO PRSV INCREASED AG IM: CPT | Performed by: INTERNAL MEDICINE

## 2019-10-15 RX ORDER — PRAVASTATIN SODIUM 10 MG
10 TABLET ORAL 3 TIMES WEEKLY
Qty: 90 TABLET | Refills: 1 | Status: SHIPPED | OUTPATIENT
Start: 2019-10-16 | End: 2020-09-17

## 2019-10-15 RX ORDER — PYRIDOXINE HCL (VITAMIN B6) 100 MG
TABLET ORAL
COMMUNITY
End: 2019-10-15 | Stop reason: SDUPTHER

## 2019-10-15 RX ORDER — SPIRONOLACTONE 25 MG
TABLET ORAL
COMMUNITY

## 2019-10-15 RX ORDER — IBUPROFEN 200 MG
200 TABLET ORAL
COMMUNITY

## 2019-10-15 NOTE — PROGRESS NOTES
Assessment and Plan:     Problem List Items Addressed This Visit        Other    Medicare annual wellness visit, subsequent - Primary        BMI Counseling: BMI was not able to be calculated due to patient refusing height and/or weight  Preventive health issues were discussed with patient, and age appropriate screening tests were ordered as noted in patient's After Visit Summary  Personalized health advice and appropriate referrals for health education or preventive services given if needed, as noted in patient's After Visit Summary  History of Present Illness:     Patient presents for Medicare Annual Wellness visit    Patient Care Team:  Josie Chen MD as PCP - General Jose Eduardo Ramirez DO     Problem List:     Patient Active Problem List   Diagnosis    Essential hypertension    Hyperlipidemia    Generalized osteoarthritis    Sciatica    Seasonal allergies    Medicare annual wellness visit, subsequent      Past Medical and Surgical History:     Past Medical History:   Diagnosis Date    Arthritis     Hyperlipidemia      Past Surgical History:   Procedure Laterality Date    BLADDER SURGERY      LAST ASSESSED [id number]    DILATION AND CURETTAGE OF UTERUS      LAST ASSESSED [id number]    TOOTH EXTRACTION      LAST ASSESSED [id number]      Family History:     Family History   Problem Relation Age of Onset    Dementia Mother     Hypertension Mother     Skin cancer Mother       Social History:     Social History     Socioeconomic History    Marital status:       Spouse name: None    Number of children: None    Years of education: None    Highest education level: None   Occupational History    None   Social Needs    Financial resource strain: None    Food insecurity:     Worry: None     Inability: None    Transportation needs:     Medical: None     Non-medical: None   Tobacco Use    Smoking status: Never Smoker    Smokeless tobacco: Never Used   Substance and Sexual Activity  Alcohol use: Yes     Comment: social     Drug use: No    Sexual activity: None   Lifestyle    Physical activity:     Days per week: None     Minutes per session: None    Stress: None   Relationships    Social connections:     Talks on phone: None     Gets together: None     Attends Orthodoxy service: None     Active member of club or organization: None     Attends meetings of clubs or organizations: None     Relationship status: None    Intimate partner violence:     Fear of current or ex partner: None     Emotionally abused: None     Physically abused: None     Forced sexual activity: None   Other Topics Concern    None   Social History Narrative    None       Medications and Allergies:     Current Outpatient Medications   Medication Sig Dispense Refill    Calcium Carbonate-Vitamin D3 (CALCIUM 600/VITAMIN D) 600-400 MG-UNIT TABS Take by mouth      fluticasone (FLONASE) 50 mcg/act nasal spray 1 spray into each nostril daily      ibuprofen (MOTRIN) 200 mg tablet Take 200 mg by mouth      lisinopril (ZESTRIL) 5 mg tablet Take 1 tablet (5 mg total) by mouth daily 90 tablet 0    Lutein 20 MG CAPS Take by mouth      pravastatin (PRAVACHOL) 10 mg tablet Take 1 tablet (10 mg total) by mouth 3 (three) times a week 90 tablet 1     No current facility-administered medications for this visit        Allergies   Allergen Reactions    Erythromycin Rash      Immunizations:     Immunization History   Administered Date(s) Administered    INFLUENZA 10/12/2014    Influenza Split High Dose Preservative Free IM 10/12/2014, 10/13/2017    Influenza, high dose seasonal 0 5 mL 10/04/2018    Pneumococcal Conjugate 13-Valent 01/21/2018    Pneumococcal Polysaccharide PPV23 04/12/2010    Tdap 10/15/2018    Zoster 04/12/2011      Health Maintenance:         Topic Date Due    CRC Screening: Colonoscopy  01/26/2022    Hepatitis C Screening  Completed         Topic Date Due    INFLUENZA VACCINE  07/01/2019      Medicare Health Risk Assessment:     Pulse 58   Temp 99 3 °F (37 4 °C) (Tympanic)   Wt 52 7 kg (116 lb 3 2 oz)   SpO2 99%   BMI 21 43 kg/m²      Last Medicare Wellness visit information reviewed, patient interviewed and updates made to the record today  Health Risk Assessment:   Patient rates overall health as good  Patient feels that their physical health rating is same  Eyesight was rated as same  Hearing was rated as same  Patient feels that their emotional and mental health rating is same  Pain experienced in the last 7 days has been some  Patient states that she has experienced no weight loss or gain in last 6 months  Fall Risk Screening: In the past year, patient has experienced: no history of falling in past year      Urinary Incontinence Screening:   Patient has not leaked urine accidently in the last six months  Home Safety:  Patient does not have trouble with stairs inside or outside of their home  Patient has no working smoke alarms and has working carbon monoxide detector  Nutrition:   Current diet is Regular, Low Cholesterol and Limited junk food  Medications:   Patient is currently taking over-the-counter supplements  OTC medications include: see medication list  Patient is able to manage medications  Activities of Daily Living (ADLs)/Instrumental Activities of Daily Living (IADLs):   Walk and transfer into and out of bed and chair?: Yes  Dress and groom yourself?: Yes    Bathe or shower yourself?: Yes    Feed yourself? Yes  Do your laundry/housekeeping?: Yes  Manage your money, pay your bills and track your expenses?: Yes  Make your own meals?: Yes    Do your own shopping?: Yes    Previous Hospitalizations:   Any hospitalizations or ED visits within the last 12 months?: No      Advance Care Planning:   Living will: Yes    Durable POA for healthcare:  Yes    Advanced directive: Yes      PREVENTIVE SCREENINGS      Cardiovascular Screening:    General: Screening Not Indicated and History Lipid Disorder      Colorectal Cancer Screening:     General: Screening Current      Breast Cancer Screening:     General: Screening Current      Cervical Cancer Screening:    General: Screening Not Indicated      Hepatitis C Screening:    General: Screening Current      Prince Darci MD

## 2019-10-15 NOTE — PATIENT INSTRUCTIONS
Medicare Preventive Visit Patient Instructions  Thank you for completing your Welcome to Medicare Visit or Medicare Annual Wellness Visit today  Your next wellness visit will be due in one year (10/15/2020)  The screening/preventive services that you may require over the next 5-10 years are detailed below  Some tests may not apply to you based off risk factors and/or age  Screening tests ordered at today's visit but not completed yet may show as past due  Also, please note that scanned in results may not display below  Preventive Screenings:  Service Recommendations Previous Testing/Comments   Colorectal Cancer Screening  * Colonoscopy    * Fecal Occult Blood Test (FOBT)/Fecal Immunochemical Test (FIT)  * Fecal DNA/Cologuard Test  * Flexible Sigmoidoscopy Age: 54-65 years old   Colonoscopy: every 10 years (may be performed more frequently if at higher risk)  OR  FOBT/FIT: every 1 year  OR  Cologuard: every 3 years  OR  Sigmoidoscopy: every 5 years  Screening may be recommended earlier than age 48 if at higher risk for colorectal cancer  Also, an individualized decision between you and your healthcare provider will decide whether screening between the ages of 74-80 would be appropriate  Colonoscopy: 01/26/2012  FOBT/FIT: 10/19/2017  Cologuard: 05/14/2019  Sigmoidoscopy: Not on file         Breast Cancer Screening Age: 36 years old  Frequency: every 1-2 years  Not required if history of left and right mastectomy Mammogram: 01/18/2018       Cervical Cancer Screening Between the ages of 21-29, pap smear recommended once every 3 years  Between the ages of 33-67, can perform pap smear with HPV co-testing every 5 years     Recommendations may differ for women with a history of total hysterectomy, cervical cancer, or abnormal pap smears in past  Pap Smear: Not on file       Hepatitis C Screening Once for adults born between [facility]  More frequently in patients at high risk for Hepatitis C Hep C Antibody: 10/19/2017       Diabetes Screening 1-2 times per year if you're at risk for diabetes or have pre-diabetes Fasting glucose: 94 mg/dL   A1C: No results in last 5 years       Cholesterol Screening Once every 5 years if you don't have a lipid disorder  May order more often based on risk factors  Lipid panel: 08/14/2018         Other Preventive Screenings Covered by Medicare:  1  Abdominal Aortic Aneurysm (AAA) Screening: covered once if your at risk  You're considered to be at risk if you have a family history of AAA  2  Lung Cancer Screening: covers low dose CT scan once per year if you meet all of the following conditions: (1) Age 50-69; (2) No signs or symptoms of lung cancer; (3) Current smoker or have quit smoking within the last 15 years; (4) You have a tobacco smoking history of at least 30 pack years (packs per day multiplied by number of years you smoked); (5) You get a written order from a healthcare provider  3  Glaucoma Screening: covered annually if you're considered high risk: (1) You have diabetes OR (2) Family history of glaucoma OR (3)  aged 48 and older OR (3)  American aged 72 and older  3  Osteoporosis Screening: covered every 2 years if you meet one of the following conditions: (1) You're estrogen deficient and at risk for osteoporosis based off medical history and other findings; (2) Have a vertebral abnormality; (3) On glucocorticoid therapy for more than 3 months; (4) Have primary hyperparathyroidism; (5) On osteoporosis medications and need to assess response to drug therapy  · Last bone density test (DXA Scan): 08/20/2015  5  HIV Screening: covered annually if you're between the age of 12-76  Also covered annually if you are younger than 13 and older than 72 with risk factors for HIV infection  For pregnant patients, it is covered up to 3 times per pregnancy      Immunizations:  Immunization Recommendations   Influenza Vaccine Annual influenza vaccination during flu season is recommended for all persons aged >= 6 months who do not have contraindications   Pneumococcal Vaccine (Prevnar and Pneumovax)  * Prevnar = PCV13  * Pneumovax = PPSV23   Adults 25-60 years old: 1-3 doses may be recommended based on certain risk factors  Adults 72 years old: Prevnar (PCV13) vaccine recommended followed by Pneumovax (PPSV23) vaccine  If already received PPSV23 since turning 65, then PCV13 recommended at least one year after PPSV23 dose  Hepatitis B Vaccine 3 dose series if at intermediate or high risk (ex: diabetes, end stage renal disease, liver disease)   Tetanus (Td) Vaccine - COST NOT COVERED BY MEDICARE PART B Following completion of primary series, a booster dose should be given every 10 years to maintain immunity against tetanus  Td may also be given as tetanus wound prophylaxis  Tdap Vaccine - COST NOT COVERED BY MEDICARE PART B Recommended at least once for all adults  For pregnant patients, recommended with each pregnancy  Shingles Vaccine (Shingrix) - COST NOT COVERED BY MEDICARE PART B  2 shot series recommended in those aged 48 and above     Health Maintenance Due:      Topic Date Due    CRC Screening: Colonoscopy  01/26/2022    Hepatitis C Screening  Completed     Immunizations Due:      Topic Date Due    INFLUENZA VACCINE  07/01/2019     Advance Directives   What are advance directives? Advance directives are legal documents that state your wishes and plans for medical care  These plans are made ahead of time in case you lose your ability to make decisions for yourself  Advance directives can apply to any medical decision, such as the treatments you want, and if you want to donate organs  What are the types of advance directives? There are many types of advance directives, and each state has rules about how to use them  You may choose a combination of any of the following:  · Living will: This is a written record of the treatment you want   You can also choose which treatments you do not want, which to limit, and which to stop at a certain time  This includes surgery, medicine, IV fluid, and tube feedings  · Durable power of  for healthcare [facility]): This is a written record that states who you want to make healthcare choices for you when you are unable to make them for yourself  This person, called a proxy, is usually a family member or a friend  You may choose more than 1 proxy  · Do not resuscitate (DNR) order:  A DNR order is used in case your heart stops beating or you stop breathing  It is a request not to have certain forms of treatment, such as CPR  A DNR order may be included in other types of advance directives  · Medical directive: This covers the care that you want if you are in a coma, near death, or unable to make decisions for yourself  You can list the treatments you want for each condition  Treatment may include pain medicine, surgery, blood transfusions, dialysis, IV or tube feedings, and a ventilator (breathing machine)  · Values history: This document has questions about your views, beliefs, and how you feel and think about life  This information can help others choose the care that you would choose  Why are advance directives important? An advance directive helps you control your care  Although spoken wishes may be used, it is better to have your wishes written down  Spoken wishes can be misunderstood, or not followed  Treatments may be given even if you do not want them  An advance directive may make it easier for your family to make difficult choices about your care  © Copyright [employer] 2018 Information is for End User's use only and may not be sold, redistributed or otherwise used for commercial purposes   All illustrations and images included in CareNotes® are the copyrighted property of [employer]  or [place]

## 2019-10-15 NOTE — PROGRESS NOTES
Assessment/Plan:     Diagnoses and all orders for this visit:    Medicare annual wellness visit, subsequent    Essential hypertension  -     Comprehensive metabolic panel; Future  -     UA w Reflex to Microscopic w Reflex to Culture  -     TSH, 3rd generation; Future  -     CBC and differential; Future  -     Ferritin; Future  -     Iron; Future  -     TIBC; Future    Hyperlipidemia, unspecified hyperlipidemia type  -     Lipid panel; Future    Vitamin D deficiency  -     Vitamin D 25 hydroxy; Future    Flu vaccine need  -     Cancel: influenza vaccine, 0442-4044, high-dose, PF 0 5 mL (FLUZONE HIGH-DOSE)  -     influenza vaccine, 1426-0859, high-dose, PF 0 5 mL (FLUZONE HIGH-DOSE)    Screening mammogram, encounter for  -     Mammo screening bilateral w 3d & cad; Future    Post-menopausal  -     DXA bone density spine hip and pelvis; Future    Achilles tendinitis, unspecified laterality    Other orders  -     ibuprofen (MOTRIN) 200 mg tablet; Take 200 mg by mouth  -     Discontinue: Calcium Carb-Cholecalciferol (CALCIUM 600+D3) 600-200 MG-UNIT TABS; Calcium 600 + D(3)  -     Lutein 20 MG CAPS; Take by mouth          Subjective:      Patient ID: Barbara Leal is a 75 y o  female  HPI  Cassidy Jim is here today for visit she generally feels well she has extensive athletic background is been a  but is found at this point in her life she has had some ligamentous and tendinous problems that have caused her to restrict her athletic abilities quite a bit  She is overdue for mammography and also a gyn visit  She had a positive:  Guard and just had a colonoscopy done which was normal he is on a modicum of medicines which we did discuss she is up-to-date with her immunizations but has not yet had the new shingles vaccine      The following portions of the patient's history were reviewed and updated as appropriate: allergies, current medications, past family history, past medical history, past social history, past surgical history and problem list     Review of Systems  basically negative except for some occasional joint and foot problems    Objective:      /70   Pulse 58   Temp 99 3 °F (37 4 °C) (Tympanic)   Wt 52 7 kg (116 lb 3 2 oz)   SpO2 99%   BMI 21 43 kg/m²          Physical Exam  [place] and neurologic examination reveals normal mentation gait station reflexes and sensory examination is patient is quite stable we will go ahead with a variety of lab studies including some iron studies suggested by the gastroenterologist in view of the positive Hemoccult will will certainly endorse her receiving the new shingles vaccine a mammogram and a DEXA scan ordered and will recommend that she see her gynecologist ed by the patient the abdomen is soft and no palpable organ enlargement or masses extremities are healthy peripheral pulses are present approximately 120/70 head is normal in contour external ocular movements pupil responses ce is a healthy appearing lady in no distress she looks younger than her stated age of pressure today was a    The jumbled words are due to the voice dictation system that  functions poorly and apparently cant  be improved

## 2019-10-16 VITALS
DIASTOLIC BLOOD PRESSURE: 70 MMHG | BODY MASS INDEX: 21.43 KG/M2 | OXYGEN SATURATION: 99 % | WEIGHT: 116.2 LBS | SYSTOLIC BLOOD PRESSURE: 120 MMHG | TEMPERATURE: 99.3 F | HEART RATE: 58 BPM

## 2019-10-16 PROBLEM — M76.60 ACHILLES TENDINITIS: Status: ACTIVE | Noted: 2019-05-09

## 2019-10-16 PROBLEM — M76.60 ACHILLES BURSITIS: Status: ACTIVE | Noted: 2017-08-08

## 2019-10-16 PROBLEM — M24.669 ANKYLOSIS OF KNEE JOINT: Status: ACTIVE | Noted: 2019-10-16

## 2019-10-16 PROBLEM — M22.40 CHONDROMALACIA PATELLAE: Status: ACTIVE | Noted: 2019-10-16

## 2019-10-23 ENCOUNTER — APPOINTMENT (OUTPATIENT)
Dept: LAB | Facility: CLINIC | Age: 74
End: 2019-10-23
Payer: MEDICARE

## 2019-10-23 DIAGNOSIS — E78.5 HYPERLIPIDEMIA, UNSPECIFIED HYPERLIPIDEMIA TYPE: ICD-10-CM

## 2019-10-23 DIAGNOSIS — I10 ESSENTIAL HYPERTENSION: ICD-10-CM

## 2019-10-23 LAB
BASOPHILS # BLD AUTO: 0.06 THOUSANDS/ΜL (ref 0–0.1)
BASOPHILS NFR BLD AUTO: 1 % (ref 0–1)
CHOLEST SERPL-MCNC: 235 MG/DL (ref 50–200)
EOSINOPHIL # BLD AUTO: 0.25 THOUSAND/ΜL (ref 0–0.61)
EOSINOPHIL NFR BLD AUTO: 6 % (ref 0–6)
ERYTHROCYTE [DISTWIDTH] IN BLOOD BY AUTOMATED COUNT: 12.5 % (ref 11.6–15.1)
FERRITIN SERPL-MCNC: 89 NG/ML (ref 8–388)
HCT VFR BLD AUTO: 41.1 % (ref 34.8–46.1)
HDLC SERPL-MCNC: 83 MG/DL
HGB BLD-MCNC: 13 G/DL (ref 11.5–15.4)
IMM GRANULOCYTES # BLD AUTO: 0.01 THOUSAND/UL (ref 0–0.2)
IMM GRANULOCYTES NFR BLD AUTO: 0 % (ref 0–2)
IRON SERPL-MCNC: 70 UG/DL (ref 50–170)
LDLC SERPL CALC-MCNC: 139 MG/DL (ref 0–100)
LYMPHOCYTES # BLD AUTO: 1.04 THOUSANDS/ΜL (ref 0.6–4.47)
LYMPHOCYTES NFR BLD AUTO: 24 % (ref 14–44)
MCH RBC QN AUTO: 30.6 PG (ref 26.8–34.3)
MCHC RBC AUTO-ENTMCNC: 31.6 G/DL (ref 31.4–37.4)
MCV RBC AUTO: 97 FL (ref 82–98)
MONOCYTES # BLD AUTO: 0.36 THOUSAND/ΜL (ref 0.17–1.22)
MONOCYTES NFR BLD AUTO: 8 % (ref 4–12)
NEUTROPHILS # BLD AUTO: 2.69 THOUSANDS/ΜL (ref 1.85–7.62)
NEUTS SEG NFR BLD AUTO: 61 % (ref 43–75)
NONHDLC SERPL-MCNC: 152 MG/DL
NRBC BLD AUTO-RTO: 0 /100 WBCS
PLATELET # BLD AUTO: 165 THOUSANDS/UL (ref 149–390)
PMV BLD AUTO: 13 FL (ref 8.9–12.7)
RBC # BLD AUTO: 4.25 MILLION/UL (ref 3.81–5.12)
TIBC SERPL-MCNC: 311 UG/DL (ref 250–450)
TRIGL SERPL-MCNC: 64 MG/DL
TSH SERPL DL<=0.05 MIU/L-ACNC: 1.57 UIU/ML (ref 0.36–3.74)
WBC # BLD AUTO: 4.41 THOUSAND/UL (ref 4.31–10.16)

## 2019-10-23 PROCEDURE — 82728 ASSAY OF FERRITIN: CPT

## 2019-10-23 PROCEDURE — 83550 IRON BINDING TEST: CPT

## 2019-10-23 PROCEDURE — 84443 ASSAY THYROID STIM HORMONE: CPT

## 2019-10-23 PROCEDURE — 36415 COLL VENOUS BLD VENIPUNCTURE: CPT

## 2019-10-23 PROCEDURE — 83540 ASSAY OF IRON: CPT

## 2019-10-23 PROCEDURE — 80061 LIPID PANEL: CPT

## 2019-10-23 PROCEDURE — 85025 COMPLETE CBC W/AUTO DIFF WBC: CPT

## 2019-10-25 ENCOUNTER — APPOINTMENT (OUTPATIENT)
Dept: LAB | Facility: MEDICAL CENTER | Age: 74
End: 2019-10-25
Payer: MEDICARE

## 2019-10-25 LAB
BACTERIA UR QL AUTO: ABNORMAL /HPF
BILIRUB UR QL STRIP: NEGATIVE
CLARITY UR: CLEAR
COLOR UR: YELLOW
GLUCOSE UR STRIP-MCNC: NEGATIVE MG/DL
HGB UR QL STRIP.AUTO: NEGATIVE
HYALINE CASTS #/AREA URNS LPF: ABNORMAL /LPF
KETONES UR STRIP-MCNC: NEGATIVE MG/DL
LEUKOCYTE ESTERASE UR QL STRIP: ABNORMAL
NITRITE UR QL STRIP: NEGATIVE
NON-SQ EPI CELLS URNS QL MICRO: ABNORMAL /HPF
PH UR STRIP.AUTO: 5.5 [PH]
PROT UR STRIP-MCNC: NEGATIVE MG/DL
RBC #/AREA URNS AUTO: ABNORMAL /HPF
SP GR UR STRIP.AUTO: 1.02 (ref 1–1.03)
UROBILINOGEN UR QL STRIP.AUTO: 0.2 E.U./DL
WBC #/AREA URNS AUTO: ABNORMAL /HPF

## 2019-10-25 PROCEDURE — 81001 URINALYSIS AUTO W/SCOPE: CPT | Performed by: INTERNAL MEDICINE

## 2019-10-27 ENCOUNTER — TELEPHONE (OUTPATIENT)
Dept: INTERNAL MEDICINE CLINIC | Facility: CLINIC | Age: 74
End: 2019-10-27

## 2019-11-14 ENCOUNTER — OFFICE VISIT (OUTPATIENT)
Dept: GYNECOLOGY | Facility: CLINIC | Age: 74
End: 2019-11-14
Payer: MEDICARE

## 2019-11-14 VITALS
DIASTOLIC BLOOD PRESSURE: 70 MMHG | HEIGHT: 61 IN | BODY MASS INDEX: 22.13 KG/M2 | WEIGHT: 117.2 LBS | SYSTOLIC BLOOD PRESSURE: 100 MMHG

## 2019-11-14 DIAGNOSIS — Z01.419 ENCOUNTER FOR ANNUAL ROUTINE GYNECOLOGICAL EXAMINATION: Primary | ICD-10-CM

## 2019-11-14 PROCEDURE — G0145 SCR C/V CYTO,THINLAYER,RESCR: HCPCS | Performed by: OBSTETRICS & GYNECOLOGY

## 2019-11-14 PROCEDURE — 87624 HPV HI-RISK TYP POOLED RSLT: CPT | Performed by: OBSTETRICS & GYNECOLOGY

## 2019-11-14 PROCEDURE — G0101 CA SCREEN;PELVIC/BREAST EXAM: HCPCS | Performed by: OBSTETRICS & GYNECOLOGY

## 2019-11-14 NOTE — PROGRESS NOTES
Assessment/Plan:       Diagnoses and all orders for this visit:    Encounter for annual routine gynecological examination  -     Liquid-based pap, screening          Subjective:      Patient ID: Suleman Cruz is a 75 y o  female  The patient is a 39-year-old who presents for a gyn exam   She denies any vaginal bleeding or breast problems  The patient has occasional urge incontinence but states this is not bad enough to treat  She is not sexually active  The patient had a normal mammogram in January of 2018  She has received a request from Dr Yanique Alberts for a DEXA scan and a mammogram   These are scheduled  The following portions of the patient's history were reviewed and updated as appropriate: allergies, current medications, past family history, past medical history, past social history, past surgical history and problem list     Review of Systems   Constitutional: Negative  Respiratory: Negative for shortness of breath  Cardiovascular: Negative for chest pain  Gastrointestinal: Negative  Genitourinary: Negative  Skin: Negative  Psychiatric/Behavioral: Negative for agitation, behavioral problems and confusion  Objective:      /70 (BP Location: Left arm, Patient Position: Sitting, Cuff Size: Standard)   Ht 5' 1" (1 549 m)   Wt 53 2 kg (117 lb 3 2 oz)   BMI 22 14 kg/m²          Physical Exam   Constitutional: She appears well-developed and well-nourished  No distress  HENT:   Head: Normocephalic  Pulmonary/Chest: Effort normal  No respiratory distress  Right breast exhibits no inverted nipple, no mass, no nipple discharge, no skin change and no tenderness  Left breast exhibits no inverted nipple, no mass, no nipple discharge, no skin change and no tenderness  Breasts are symmetrical    Abdominal: She exhibits no distension and no mass  There is no tenderness  There is no rebound and no guarding  Genitourinary: Rectum normal and uterus normal  No labial fusion   There is no rash, tenderness, lesion or injury on the right labia  There is no rash, tenderness, lesion or injury on the left labia  Uterus is not deviated, not enlarged, not fixed and not tender  Cervix exhibits no motion tenderness, no discharge and no friability  Right adnexum displays no mass, no tenderness and no fullness  Left adnexum displays no mass, no tenderness and no fullness  No erythema, tenderness or bleeding in the vagina  No foreign body in the vagina  No signs of injury around the vagina  No vaginal discharge found  Genitourinary Comments: The urethral meatus is normal   Atrophic vaginal changes are present  The cervix is stenotic   Lymphadenopathy:        Right axillary: No pectoral and no lateral adenopathy present  Left axillary: No pectoral and no lateral adenopathy present  Skin: Skin is warm, dry and intact  She is not diaphoretic  No cyanosis  Nails show no clubbing  Psychiatric: She has a normal mood and affect   Her speech is normal and behavior is normal

## 2019-11-19 LAB
HPV HR 12 DNA CVX QL NAA+PROBE: NEGATIVE
HPV16 DNA CVX QL NAA+PROBE: NEGATIVE
HPV18 DNA CVX QL NAA+PROBE: NEGATIVE

## 2019-11-21 LAB
LAB AP GYN PRIMARY INTERPRETATION: NORMAL
Lab: NORMAL

## 2019-12-13 ENCOUNTER — OFFICE VISIT (OUTPATIENT)
Dept: URGENT CARE | Facility: MEDICAL CENTER | Age: 74
End: 2019-12-13
Payer: MEDICARE

## 2019-12-13 ENCOUNTER — TELEPHONE (OUTPATIENT)
Dept: INTERNAL MEDICINE CLINIC | Facility: CLINIC | Age: 74
End: 2019-12-13

## 2019-12-13 VITALS
HEIGHT: 61 IN | RESPIRATION RATE: 16 BRPM | SYSTOLIC BLOOD PRESSURE: 150 MMHG | HEART RATE: 62 BPM | BODY MASS INDEX: 21.71 KG/M2 | DIASTOLIC BLOOD PRESSURE: 97 MMHG | OXYGEN SATURATION: 100 % | WEIGHT: 115 LBS | TEMPERATURE: 96.9 F

## 2019-12-13 DIAGNOSIS — L30.9 DERMATITIS: Primary | ICD-10-CM

## 2019-12-13 PROCEDURE — 99213 OFFICE O/P EST LOW 20 MIN: CPT | Performed by: PHYSICIAN ASSISTANT

## 2019-12-13 PROCEDURE — G0463 HOSPITAL OUTPT CLINIC VISIT: HCPCS | Performed by: PHYSICIAN ASSISTANT

## 2019-12-13 RX ORDER — TRIAMCINOLONE ACETONIDE 1 MG/G
CREAM TOPICAL 2 TIMES DAILY
Qty: 30 G | Refills: 0 | Status: SHIPPED | OUTPATIENT
Start: 2019-12-13 | End: 2021-04-29

## 2019-12-13 NOTE — PROGRESS NOTES
[facility]        NAME: Johanny Joy is a 75 y o  female  : 1945    MRN: [id number]  DATE: 2019  TIME: 5:04 PM    Assessment and Plan   Dermatitis [L30 9]  1  Dermatitis  triamcinolone (KENALOG) 0 1 % cream         Patient Instructions       Follow up with PCP in 3-5 days  Chief Complaint     Chief Complaint   Patient presents with    Rash     Patient relates she has been sick since Monday with viral illness  Today started with a rash to back of neck x2 hours ago  "I get herpes sore on my lips and not sure if rash to back of my neck is herpes " Denies fever  History of Present Illness       Patient had a virus earlier in the week which has resolved  Patient complains of localized rash around her neck which is slightly pruritic over the last 2 hours no new detergents or soaps  Rash   This is a new problem  The current episode started today  The problem is unchanged  The rash is characterized by redness and itchiness  She was exposed to nothing  Past treatments include nothing  Review of Systems   Review of Systems   Skin: Positive for rash  All other systems reviewed and are negative          Current Medications       Current Outpatient Medications:     Calcium Carbonate-Vitamin D3 (CALCIUM 600/VITAMIN D) 600-400 MG-UNIT TABS, Take by mouth, Disp: , Rfl:     fluticasone (FLONASE) 50 mcg/act nasal spray, 1 spray into each nostril as needed , Disp: , Rfl:     ibuprofen (MOTRIN) 200 mg tablet, Take 200 mg by mouth, Disp: , Rfl:     lisinopril (ZESTRIL) 5 mg tablet, Take 1 tablet (5 mg total) by mouth daily, Disp: 90 tablet, Rfl: 0    Lutein 20 MG CAPS, Take by mouth, Disp: , Rfl:     pravastatin (PRAVACHOL) 10 mg tablet, Take 1 tablet (10 mg total) by mouth 3 (three) times a week, Disp: 90 tablet, Rfl: 1    triamcinolone (KENALOG) 0 1 % cream, Apply topically 2 (two) times a day, Disp: 30 g, Rfl: 0    Current Allergies     Allergies as of 2019 - Reviewed 12/13/2019   Allergen Reaction Noted    Erythromycin Rash 02/12/2018            The following portions of the patient's history were reviewed and updated as appropriate: allergies, current medications, past family history, past medical history, past social history, past surgical history and problem list      Past Medical History:   Diagnosis Date    Arthritis     Hyperlipidemia     Hypertension        Past Surgical History:   Procedure Laterality Date    BLADDER SURGERY      LAST ASSESSED [id number]    DILATION AND CURETTAGE OF UTERUS      LAST ASSESSED [id number]    TOOTH EXTRACTION      LAST ASSESSED [id number]       Family History   Problem Relation Age of Onset    Dementia Mother     Hypertension Mother     Skin cancer Mother          Medications have been verified  Objective   /97   Pulse 62   Temp (!) 96 9 °F (36 1 °C) (Tympanic)   Resp 16   Ht 5' 1" (1 549 m)   Wt 52 2 kg (115 lb)   SpO2 100%   BMI 21 73 kg/m²        Physical Exam     Physical Exam   Constitutional: She appears well-developed and well-nourished  Cardiovascular: Normal rate  Pulmonary/Chest: Effort normal    Nursing note and vitals reviewed  on both sides of posterior neck scattered papules and macules

## 2019-12-13 NOTE — TELEPHONE ENCOUNTER
Pt has a viral infection, she feels she has some type of rash now on her neck and is not sure if its herpes zoster  She does not have pain though  Advised pt to go to [address] [place] [address] and have them look at her rash to make sure she gets an antiviral med if needed

## 2019-12-16 ENCOUNTER — HOSPITAL ENCOUNTER (OUTPATIENT)
Dept: MAMMOGRAPHY | Facility: MEDICAL CENTER | Age: 74
Discharge: HOME | End: 2019-12-16
Payer: MEDICARE

## 2019-12-16 ENCOUNTER — HOSPITAL ENCOUNTER (OUTPATIENT)
Dept: BONE DENSITY | Facility: MEDICAL CENTER | Age: 74
Discharge: HOME | End: 2019-12-16
Payer: MEDICARE

## 2019-12-16 VITALS — HEIGHT: 62 IN | WEIGHT: 115 LBS | BODY MASS INDEX: 21.16 KG/M2

## 2019-12-16 DIAGNOSIS — Z12.31 SCREENING MAMMOGRAM, ENCOUNTER FOR: ICD-10-CM

## 2019-12-16 DIAGNOSIS — Z78.0 POST-MENOPAUSAL: ICD-10-CM

## 2019-12-16 PROCEDURE — 77080 DXA BONE DENSITY AXIAL: CPT

## 2019-12-16 PROCEDURE — 77063 BREAST TOMOSYNTHESIS BI: CPT

## 2019-12-16 PROCEDURE — 77067 SCR MAMMO BI INCL CAD: CPT

## 2019-12-26 ENCOUNTER — TELEPHONE (OUTPATIENT)
Dept: INTERNAL MEDICINE CLINIC | Facility: CLINIC | Age: 74
End: 2019-12-26

## 2019-12-26 NOTE — TELEPHONE ENCOUNTER
----- Message from Salvador Mary MD sent at 12/18/2019 10:29 AM EST -----  Continue vitamin-D and calcium from dietary sources

## 2020-01-06 DIAGNOSIS — I10 ESSENTIAL HYPERTENSION: ICD-10-CM

## 2020-01-06 RX ORDER — LISINOPRIL 5 MG/1
5 TABLET ORAL DAILY
Qty: 90 TABLET | Refills: 0 | Status: SHIPPED | OUTPATIENT
Start: 2020-01-06 | End: 2020-03-10

## 2020-03-10 DIAGNOSIS — I10 ESSENTIAL HYPERTENSION: ICD-10-CM

## 2020-03-10 RX ORDER — LISINOPRIL 5 MG/1
TABLET ORAL
Qty: 90 TABLET | Refills: 0 | Status: SHIPPED | OUTPATIENT
Start: 2020-03-10 | End: 2020-06-03

## 2020-06-03 DIAGNOSIS — I10 ESSENTIAL HYPERTENSION: ICD-10-CM

## 2020-06-03 RX ORDER — LISINOPRIL 5 MG/1
TABLET ORAL
Qty: 90 TABLET | Refills: 0 | Status: SHIPPED | OUTPATIENT
Start: 2020-06-03 | End: 2020-08-27

## 2020-08-27 DIAGNOSIS — I10 ESSENTIAL HYPERTENSION: ICD-10-CM

## 2020-08-27 RX ORDER — LISINOPRIL 5 MG/1
TABLET ORAL
Qty: 90 TABLET | Refills: 0 | Status: SHIPPED | OUTPATIENT
Start: 2020-08-27 | End: 2020-09-04

## 2020-09-03 DIAGNOSIS — I10 ESSENTIAL HYPERTENSION: ICD-10-CM

## 2020-09-04 RX ORDER — LISINOPRIL 5 MG/1
TABLET ORAL
Qty: 90 TABLET | Refills: 0 | Status: SHIPPED | OUTPATIENT
Start: 2020-09-04 | End: 2021-04-04

## 2020-09-17 DIAGNOSIS — E78.5 HYPERLIPIDEMIA, UNSPECIFIED HYPERLIPIDEMIA TYPE: ICD-10-CM

## 2020-09-17 RX ORDER — PRAVASTATIN SODIUM 10 MG
TABLET ORAL
Qty: 36 TABLET | Refills: 2 | Status: SHIPPED | OUTPATIENT
Start: 2020-09-17 | End: 2021-06-03

## 2020-10-21 ENCOUNTER — OFFICE VISIT (OUTPATIENT)
Dept: INTERNAL MEDICINE CLINIC | Facility: CLINIC | Age: 75
End: 2020-10-21
Payer: MEDICARE

## 2020-10-21 DIAGNOSIS — B00.1 RECURRENT COLD SORES: ICD-10-CM

## 2020-10-21 DIAGNOSIS — E78.5 HYPERLIPIDEMIA, UNSPECIFIED HYPERLIPIDEMIA TYPE: Primary | ICD-10-CM

## 2020-10-21 DIAGNOSIS — E55.9 VITAMIN D DEFICIENCY: ICD-10-CM

## 2020-10-21 DIAGNOSIS — Z12.31 ENCOUNTER FOR SCREENING MAMMOGRAM FOR MALIGNANT NEOPLASM OF BREAST: ICD-10-CM

## 2020-10-21 DIAGNOSIS — Z00.00 MEDICARE ANNUAL WELLNESS VISIT, SUBSEQUENT: ICD-10-CM

## 2020-10-21 DIAGNOSIS — I10 ESSENTIAL HYPERTENSION: ICD-10-CM

## 2020-10-21 DIAGNOSIS — G47.00 INSOMNIA, UNSPECIFIED TYPE: ICD-10-CM

## 2020-10-21 DIAGNOSIS — Z23 FLU VACCINE NEED: ICD-10-CM

## 2020-10-21 DIAGNOSIS — J30.2 SEASONAL ALLERGIES: ICD-10-CM

## 2020-10-21 PROCEDURE — 90662 IIV NO PRSV INCREASED AG IM: CPT | Performed by: INTERNAL MEDICINE

## 2020-10-21 PROCEDURE — G0438 PPPS, INITIAL VISIT: HCPCS | Performed by: INTERNAL MEDICINE

## 2020-10-21 PROCEDURE — G0008 ADMIN INFLUENZA VIRUS VAC: HCPCS | Performed by: INTERNAL MEDICINE

## 2020-10-21 PROCEDURE — 1123F ACP DISCUSS/DSCN MKR DOCD: CPT | Performed by: INTERNAL MEDICINE

## 2020-10-21 PROCEDURE — 99214 OFFICE O/P EST MOD 30 MIN: CPT | Performed by: INTERNAL MEDICINE

## 2020-10-21 RX ORDER — ZOLPIDEM TARTRATE 5 MG/1
5 TABLET ORAL
Qty: 30 TABLET | Refills: 0 | Status: CANCELLED | OUTPATIENT
Start: 2020-10-21

## 2020-10-22 DIAGNOSIS — F51.01 PRIMARY INSOMNIA: Primary | ICD-10-CM

## 2020-10-22 RX ORDER — ZOLPIDEM TARTRATE 10 MG/1
10 TABLET ORAL
Qty: 30 TABLET | Refills: 0 | Status: SHIPPED | OUTPATIENT
Start: 2020-10-22 | End: 2021-04-29

## 2020-10-22 RX ORDER — ACYCLOVIR 50 MG/G
OINTMENT TOPICAL
Qty: 15 G | Refills: 0 | Status: SHIPPED | OUTPATIENT
Start: 2020-10-22 | End: 2021-04-29

## 2020-10-22 RX ORDER — FLUTICASONE PROPIONATE 50 MCG
1 SPRAY, SUSPENSION (ML) NASAL AS NEEDED
Qty: 1 BOTTLE | Refills: 2 | Status: SHIPPED | OUTPATIENT
Start: 2020-10-22 | End: 2022-07-20

## 2020-10-23 VITALS
DIASTOLIC BLOOD PRESSURE: 84 MMHG | BODY MASS INDEX: 20.43 KG/M2 | TEMPERATURE: 97.2 F | SYSTOLIC BLOOD PRESSURE: 130 MMHG | HEART RATE: 56 BPM | WEIGHT: 111 LBS | OXYGEN SATURATION: 99 % | HEIGHT: 62 IN

## 2020-10-23 DIAGNOSIS — G47.00 INSOMNIA, UNSPECIFIED TYPE: Primary | ICD-10-CM

## 2020-10-24 RX ORDER — ZOLPIDEM TARTRATE 5 MG/1
5 TABLET ORAL
Qty: 30 TABLET | Refills: 0 | Status: SHIPPED | OUTPATIENT
Start: 2020-10-24 | End: 2021-04-29

## 2020-10-27 ENCOUNTER — APPOINTMENT (OUTPATIENT)
Dept: LAB | Facility: CLINIC | Age: 75
End: 2020-10-27
Payer: MEDICARE

## 2020-10-27 DIAGNOSIS — E78.5 HYPERLIPIDEMIA, UNSPECIFIED HYPERLIPIDEMIA TYPE: ICD-10-CM

## 2020-10-27 DIAGNOSIS — I10 ESSENTIAL HYPERTENSION: ICD-10-CM

## 2020-10-27 DIAGNOSIS — E55.9 VITAMIN D DEFICIENCY: ICD-10-CM

## 2020-10-27 LAB
25(OH)D3 SERPL-MCNC: 68.6 NG/ML (ref 30–100)
ALBUMIN SERPL BCP-MCNC: 4.1 G/DL (ref 3.5–5)
ALP SERPL-CCNC: 69 U/L (ref 46–116)
ALT SERPL W P-5'-P-CCNC: 19 U/L (ref 12–78)
ANION GAP SERPL CALCULATED.3IONS-SCNC: 1 MMOL/L (ref 4–13)
AST SERPL W P-5'-P-CCNC: 15 U/L (ref 5–45)
BACTERIA UR QL AUTO: ABNORMAL /HPF
BASOPHILS # BLD AUTO: 0.06 THOUSANDS/ΜL (ref 0–0.1)
BASOPHILS NFR BLD AUTO: 1 % (ref 0–1)
BILIRUB SERPL-MCNC: 1.3 MG/DL (ref 0.2–1)
BILIRUB UR QL STRIP: NEGATIVE
BUN SERPL-MCNC: 26 MG/DL (ref 5–25)
CALCIUM SERPL-MCNC: 9.4 MG/DL (ref 8.3–10.1)
CHLORIDE SERPL-SCNC: 107 MMOL/L (ref 100–108)
CHOLEST SERPL-MCNC: 215 MG/DL (ref 50–200)
CLARITY UR: CLEAR
CO2 SERPL-SCNC: 30 MMOL/L (ref 21–32)
COLOR UR: YELLOW
CREAT SERPL-MCNC: 0.94 MG/DL (ref 0.6–1.3)
EOSINOPHIL # BLD AUTO: 0.18 THOUSAND/ΜL (ref 0–0.61)
EOSINOPHIL NFR BLD AUTO: 4 % (ref 0–6)
ERYTHROCYTE [DISTWIDTH] IN BLOOD BY AUTOMATED COUNT: 12.3 % (ref 11.6–15.1)
GFR SERPL CREATININE-BSD FRML MDRD: 59 ML/MIN/1.73SQ M
GLUCOSE P FAST SERPL-MCNC: 88 MG/DL (ref 65–99)
GLUCOSE UR STRIP-MCNC: NEGATIVE MG/DL
HCT VFR BLD AUTO: 40.1 % (ref 34.8–46.1)
HDLC SERPL-MCNC: 96 MG/DL
HGB BLD-MCNC: 12.7 G/DL (ref 11.5–15.4)
HGB UR QL STRIP.AUTO: NEGATIVE
HYALINE CASTS #/AREA URNS LPF: ABNORMAL /LPF
IMM GRANULOCYTES # BLD AUTO: 0.02 THOUSAND/UL (ref 0–0.2)
IMM GRANULOCYTES NFR BLD AUTO: 0 % (ref 0–2)
KETONES UR STRIP-MCNC: NEGATIVE MG/DL
LDLC SERPL CALC-MCNC: 103 MG/DL (ref 0–100)
LEUKOCYTE ESTERASE UR QL STRIP: ABNORMAL
LYMPHOCYTES # BLD AUTO: 1.23 THOUSANDS/ΜL (ref 0.6–4.47)
LYMPHOCYTES NFR BLD AUTO: 27 % (ref 14–44)
MCH RBC QN AUTO: 30.5 PG (ref 26.8–34.3)
MCHC RBC AUTO-ENTMCNC: 31.7 G/DL (ref 31.4–37.4)
MCV RBC AUTO: 96 FL (ref 82–98)
MONOCYTES # BLD AUTO: 0.34 THOUSAND/ΜL (ref 0.17–1.22)
MONOCYTES NFR BLD AUTO: 7 % (ref 4–12)
NEUTROPHILS # BLD AUTO: 2.82 THOUSANDS/ΜL (ref 1.85–7.62)
NEUTS SEG NFR BLD AUTO: 61 % (ref 43–75)
NITRITE UR QL STRIP: POSITIVE
NON-SQ EPI CELLS URNS QL MICRO: ABNORMAL /HPF
NONHDLC SERPL-MCNC: 119 MG/DL
NRBC BLD AUTO-RTO: 0 /100 WBCS
PH UR STRIP.AUTO: 6.5 [PH]
PLATELET # BLD AUTO: 160 THOUSANDS/UL (ref 149–390)
PMV BLD AUTO: 13.4 FL (ref 8.9–12.7)
POTASSIUM SERPL-SCNC: 4 MMOL/L (ref 3.5–5.3)
PROT SERPL-MCNC: 7.5 G/DL (ref 6.4–8.2)
PROT UR STRIP-MCNC: NEGATIVE MG/DL
RBC # BLD AUTO: 4.16 MILLION/UL (ref 3.81–5.12)
RBC #/AREA URNS AUTO: ABNORMAL /HPF
SODIUM SERPL-SCNC: 138 MMOL/L (ref 136–145)
SP GR UR STRIP.AUTO: 1.02 (ref 1–1.03)
TRIGL SERPL-MCNC: 82 MG/DL
TSH SERPL DL<=0.05 MIU/L-ACNC: 1.52 UIU/ML (ref 0.36–3.74)
UROBILINOGEN UR QL STRIP.AUTO: 0.2 E.U./DL
WBC # BLD AUTO: 4.65 THOUSAND/UL (ref 4.31–10.16)
WBC #/AREA URNS AUTO: ABNORMAL /HPF

## 2020-10-27 PROCEDURE — 84443 ASSAY THYROID STIM HORMONE: CPT | Performed by: INTERNAL MEDICINE

## 2020-10-27 PROCEDURE — 82306 VITAMIN D 25 HYDROXY: CPT | Performed by: INTERNAL MEDICINE

## 2020-10-27 PROCEDURE — 80053 COMPREHEN METABOLIC PANEL: CPT | Performed by: INTERNAL MEDICINE

## 2020-10-27 PROCEDURE — 81001 URINALYSIS AUTO W/SCOPE: CPT | Performed by: INTERNAL MEDICINE

## 2020-10-27 PROCEDURE — 85025 COMPLETE CBC W/AUTO DIFF WBC: CPT

## 2020-10-27 PROCEDURE — 36415 COLL VENOUS BLD VENIPUNCTURE: CPT | Performed by: INTERNAL MEDICINE

## 2020-10-27 PROCEDURE — 80061 LIPID PANEL: CPT | Performed by: INTERNAL MEDICINE

## 2020-11-04 ENCOUNTER — TELEPHONE (OUTPATIENT)
Dept: INTERNAL MEDICINE CLINIC | Facility: CLINIC | Age: 75
End: 2020-11-04

## 2020-11-05 ENCOUNTER — OFFICE VISIT (OUTPATIENT)
Dept: GYNECOLOGY | Facility: CLINIC | Age: 75
End: 2020-11-05
Payer: MEDICARE

## 2020-11-05 VITALS
WEIGHT: 113.4 LBS | BODY MASS INDEX: 21.41 KG/M2 | HEIGHT: 61 IN | SYSTOLIC BLOOD PRESSURE: 118 MMHG | HEART RATE: 79 BPM | DIASTOLIC BLOOD PRESSURE: 78 MMHG | TEMPERATURE: 97.6 F

## 2020-11-05 DIAGNOSIS — N81.11 CYSTOCELE, MIDLINE: ICD-10-CM

## 2020-11-05 DIAGNOSIS — N95.2 VAGINAL ATROPHY: Primary | ICD-10-CM

## 2020-11-05 PROCEDURE — 99213 OFFICE O/P EST LOW 20 MIN: CPT | Performed by: OBSTETRICS & GYNECOLOGY

## 2020-11-05 RX ORDER — ESTRADIOL 0.1 MG/G
0.5 CREAM VAGINAL 2 TIMES WEEKLY
Qty: 42.5 G | Refills: 1 | Status: SHIPPED | OUTPATIENT
Start: 2020-11-05 | End: 2022-04-27 | Stop reason: SDUPTHER

## 2021-01-15 ENCOUNTER — OFFICE VISIT (OUTPATIENT)
Dept: URGENT CARE | Facility: MEDICAL CENTER | Age: 76
End: 2021-01-15
Payer: MEDICARE

## 2021-01-15 ENCOUNTER — TELEPHONE (OUTPATIENT)
Dept: INTERNAL MEDICINE CLINIC | Facility: CLINIC | Age: 76
End: 2021-01-15

## 2021-01-15 VITALS
RESPIRATION RATE: 20 BRPM | SYSTOLIC BLOOD PRESSURE: 142 MMHG | HEART RATE: 70 BPM | OXYGEN SATURATION: 98 % | HEIGHT: 62 IN | BODY MASS INDEX: 20.24 KG/M2 | DIASTOLIC BLOOD PRESSURE: 78 MMHG | WEIGHT: 110 LBS | TEMPERATURE: 97.1 F

## 2021-01-15 DIAGNOSIS — L50.9 HIVES: Primary | ICD-10-CM

## 2021-01-15 PROCEDURE — G0463 HOSPITAL OUTPT CLINIC VISIT: HCPCS | Performed by: PHYSICIAN ASSISTANT

## 2021-01-15 PROCEDURE — 99213 OFFICE O/P EST LOW 20 MIN: CPT | Performed by: PHYSICIAN ASSISTANT

## 2021-01-15 RX ORDER — FAMOTIDINE 40 MG/1
20 TABLET, FILM COATED ORAL
Qty: 10 TABLET | Refills: 0 | Status: SHIPPED | OUTPATIENT
Start: 2021-01-15 | End: 2022-02-25

## 2021-01-15 RX ORDER — METHYLPREDNISOLONE ACETATE 40 MG/ML
40 INJECTION, SUSPENSION INTRA-ARTICULAR; INTRALESIONAL; INTRAMUSCULAR; SOFT TISSUE ONCE
Status: COMPLETED | OUTPATIENT
Start: 2021-01-15 | End: 2021-01-15

## 2021-01-15 RX ADMIN — METHYLPREDNISOLONE ACETATE 40 MG: 40 INJECTION, SUSPENSION INTRA-ARTICULAR; INTRALESIONAL; INTRAMUSCULAR; SOFT TISSUE at 12:33

## 2021-01-15 NOTE — PROGRESS NOTES
[facility]        NAME: Adeola Cheung is a 67 y o  female  : 1945    MRN: [id number]  DATE: January 15, 2021  TIME: 12:32 PM    Assessment and Plan   Hives [L50 9]  1  Hives  methylPREDNISolone acetate (DEPO-MEDROL) injection 40 mg    famotidine (PEPCID) 40 MG tablet         Patient Instructions     Explained to patient she should stop Benadryl in utilize Claritin 20 mg daily  I also explained we will give her a Depo-Medrol shot in she will resume her Dosepak tomorrow  In addition she will take Pepcid daily  Follow up with PCP in 3-5 days  Proceed to  ER if symptoms worsen  Chief Complaint     Chief Complaint   Patient presents with    Rash     pt c/o red, itchy, blotchy rash allover her body that started on 21 and has been worsening  Denies fever  pt was seen at [facility] and rx'd medrol dose pack which she states is not helping  Pt unsure of what could have caused rash, may have been new laundry detergent  History of Present Illness       Patient had used a new detergent and started with hives which has increased  [facility] a Medrol Dosepak and she took 1 day as well as Benadryl 25 mg every 4 hours  The Benadryl helps but she continues to have her hives  She took a hot shower and her hives increased  Rash  This is a new problem  The current episode started yesterday  The problem has been gradually worsening since onset  The rash is diffuse  The rash is characterized by itchiness  She was exposed to a new detergent/soap  Pertinent negatives include no anorexia, congestion, cough, diarrhea, eye pain, facial edema, fatigue, fever, joint pain, nail changes, rhinorrhea, shortness of breath, sore throat or vomiting  Past treatments include oral steroids and antihistamine  The treatment provided no relief  Review of Systems   Review of Systems   Constitutional: Negative for fatigue and fever     HENT: Negative for congestion, rhinorrhea and sore throat  Eyes: Negative for pain  Respiratory: Negative for cough and shortness of breath  Gastrointestinal: Negative for anorexia, diarrhea and vomiting  Musculoskeletal: Negative for joint pain  Skin: Positive for rash  Negative for nail changes  All other systems reviewed and are negative          Current Medications       Current Outpatient Medications:     acyclovir (ZOVIRAX) 5 % ointment, Apply topically every 3 (three) hours (Patient not taking: Reported on 11/5/2020), Disp: 15 g, Rfl: 0    Calcium Carbonate-Vitamin D3 (CALCIUM 600/VITAMIN D) 600-400 MG-UNIT TABS, Take by mouth, Disp: , Rfl:     estradiol (ESTRACE) 0 1 mg/g vaginal cream, Insert 0 5 g into the vagina 2 (two) times a week Use every night for 2 weeks and then twice a week , Disp: 42 5 g, Rfl: 1    famotidine (PEPCID) 40 MG tablet, Take 0 5 tablets (20 mg total) by mouth daily at bedtime as needed for heartburn for up to 10 days, Disp: 10 tablet, Rfl: 0    fluticasone (FLONASE) 50 mcg/act nasal spray, 1 spray into each nostril as needed for allergies, Disp: 1 Bottle, Rfl: 2    ibuprofen (MOTRIN) 200 mg tablet, Take 200 mg by mouth, Disp: , Rfl:     lisinopril (ZESTRIL) 5 mg tablet, TAKE 1 TABLET BY MOUTH EVERY DAY, Disp: 90 tablet, Rfl: 0    Lutein 20 MG CAPS, Take by mouth, Disp: , Rfl:     pravastatin (PRAVACHOL) 10 mg tablet, TAKE 1 TABLET BY MOUTH 3 TIMES A WEEK, Disp: 36 tablet, Rfl: 2    triamcinolone (KENALOG) 0 1 % cream, Apply topically 2 (two) times a day (Patient not taking: Reported on 11/5/2020), Disp: 30 g, Rfl: 0    zolpidem (AMBIEN) 10 mg tablet, Take 1 tablet (10 mg total) by mouth daily at bedtime as needed for sleep (Patient not taking: Reported on 11/5/2020), Disp: 30 tablet, Rfl: 0    zolpidem (AMBIEN) 5 mg tablet, Take 1 tablet (5 mg total) by mouth daily at bedtime as needed for sleep (Patient not taking: Reported on 11/5/2020), Disp: 30 tablet, Rfl: 0    Current Facility-Administered Medications:     methylPREDNISolone acetate (DEPO-MEDROL) injection 40 mg, 40 mg, Intramuscular, Once, Kaitlin Reaves PA-C    Current Allergies     Allergies as of 01/15/2021 - Reviewed 01/15/2021   Allergen Reaction Noted    Erythromycin Rash 02/12/2018            The following portions of the patient's history were reviewed and updated as appropriate: allergies, current medications, past family history, past medical history, past social history, past surgical history and problem list      Past Medical History:   Diagnosis Date    Arthritis     Hyperlipidemia     Hypertension        Past Surgical History:   Procedure Laterality Date    BLADDER SURGERY      LAST ASSESSED [id number]    DILATION AND CURETTAGE OF UTERUS      LAST ASSESSED [id number]    TOOTH EXTRACTION      LAST ASSESSED [id number]       Family History   Problem Relation Age of Onset    Dementia Mother     Hypertension Mother     Skin cancer Mother         age unknown   [place] No Known Problems Father     No Known Problems Daughter     No Known Problems Maternal Grandmother     No Known Problems Maternal Grandfather     No Known Problems Paternal Grandmother     No Known Problems Paternal Grandfather     No Known Problems Daughter     No Known Problems Paternal Aunt          Medications have been verified  Objective   /78   Pulse 70   Temp (!) 97 1 °F (36 2 °C) (Tympanic)   Resp 20   Ht 5' 1 5" (1 562 m)   Wt 49 9 kg (110 lb)   SpO2 98%   BMI 20 45 kg/m²   No LMP recorded  Patient is postmenopausal        Physical Exam     Physical Exam  Vitals signs and nursing note reviewed  Constitutional:       Appearance: Normal appearance  She is normal weight  HENT:      Head: Normocephalic  Nose: Nose normal    Cardiovascular:      Rate and Rhythm: Normal rate and regular rhythm  Pulses: Normal pulses  Heart sounds: Normal heart sounds     Pulmonary:      Effort: Pulmonary effort is normal       Breath sounds: Normal breath sounds  Neurological:      Mental Status: She is alert         Hives throughout the body

## 2021-01-15 NOTE — PATIENT INSTRUCTIONS
Urticaria   WHAT YOU NEED TO KNOW:   Urticaria is also called hives  Hives can change size and shape, and appear anywhere on your skin  They can be mild or severe and last from a few minutes to a few days  Hives may be a sign of a severe allergic reaction called anaphylaxis that needs immediate treatment  Urticaria that lasts longer than 6 weeks may be a chronic condition that needs long-term treatment  DISCHARGE INSTRUCTIONS:   Call 911 for signs or symptoms of anaphylaxis,  such as trouble breathing, swelling in your mouth or throat, or wheezing  You may also have itching, a rash, or feel like you are going to faint  Return to the emergency department if:   · Your heart is beating faster than it normally does  · You have cramping or severe pain in your abdomen  Contact your healthcare provider if:   · You have a fever  · Your skin still itches 24 hours after you take your medicine  · You still have hives after 7 days  · Your joints are painful and swollen  · You have questions or concerns about your condition or care  Medicines:   · Epinephrine  is used to treat severe allergic reactions such as anaphylaxis  · Antihistamines  decrease mild symptoms such as itching or a rash  · Steroids  decrease redness, pain, and swelling  · Take your medicine as directed  Contact your healthcare provider if you think your medicine is not helping or if you have side effects  Tell him of her if you are allergic to any medicine  Keep a list of the medicines, vitamins, and herbs you take  Include the amounts, and when and why you take them  Bring the list or the pill bottles to follow-up visits  Carry your medicine list with you in case of an emergency  Steps to take for signs or symptoms of anaphylaxis:   · Immediately  give 1 shot of epinephrine only into the outer thigh muscle  · Leave the shot in place  as directed   Your healthcare provider may recommend you leave it in place for up to 10 seconds before you remove it  This helps make sure all of the epinephrine is delivered  · Call 911 and go to the emergency department,  even if the shot improved symptoms  Do not drive yourself  Bring the used epinephrine shot with you  Safety precautions to take if you are at risk for anaphylaxis:   · Keep 2 shots of epinephrine with you at all times  You may need a second shot, because epinephrine only works for about 20 minutes and symptoms may return  Your healthcare provider can show you and family members how to give the shot  Check the expiration date every month and replace it before it expires  · Create an action plan  Your healthcare provider can help you create a written plan that explains the allergy and an emergency plan to treat a reaction  The plan explains when to give a second epinephrine shot if symptoms return or do not improve after the first  Give copies of the action plan and emergency instructions to family members, work and school staff, and  providers  Show them how to give a shot of epinephrine  · Be careful when you exercise  If you have had exercise-induced anaphylaxis, do not exercise right after you eat  Stop exercising right away if you start to develop any signs or symptoms of anaphylaxis  You may first feel tired, warm, or have itchy skin  Hives, swelling, and severe breathing problems may develop if you continue to exercise  · Carry medical alert identification  Wear medical alert jewelry or carry a card that explains the allergy  Ask your healthcare provider where to get these items  · Keep a record of triggers and symptoms  Record everything you eat, drink, or apply to your skin for 3 weeks  Include stressful events and what you were doing right before your hives started  Bring the record with you to follow-up visits with your healthcare provider  Manage urticaria:   · Cool your skin  This may help decrease itching   Apply a cool pack to your hives  Dip a hand towel in cool water, wring it out, and place it on your hives  You may also soak your skin in a cool oatmeal bath  · Do not rub your hives  This can irritate your skin and cause more hives  · Wear loose clothing  Tight clothes may irritate your skin and cause more hives  · Manage stress  Stress may trigger hives, or make them worse  Learn new ways to relax, such as deep breathing  Follow up with your healthcare provider as directed:  Write down your questions so you remember to ask them during your visits  © Copyright [address] Information is for End User's use only and may not be sold, redistributed or otherwise used for commercial purposes  All illustrations and images included in CareNotes® are the copyrighted property of [employer]  or [facility] [employer]   The above information is an  only  It is not intended as medical advice for individual conditions or treatments  Talk to your doctor, nurse or pharmacist before following any medical regimen to see if it is safe and effective for you

## 2021-01-15 NOTE — TELEPHONE ENCOUNTER
Phone call from Friday minda:  C/o "rash on the trunk of my body "  She went to [facility] yesterday and was given a Medrol Dose Pack  She took the first dose yesterday and Benadryl due to the itching  She asked for an appt today and was told Dr Neomí Quiñones was not in the office  I did speak with Dr Noemí Quiñones, he said to have Friday minda go back to the Urgent Care

## 2021-01-20 DIAGNOSIS — Z23 ENCOUNTER FOR IMMUNIZATION: ICD-10-CM

## 2021-02-03 ENCOUNTER — PATIENT MESSAGE (OUTPATIENT)
Dept: INTERNAL MEDICINE CLINIC | Facility: CLINIC | Age: 76
End: 2021-02-03

## 2021-02-26 ENCOUNTER — HOSPITAL ENCOUNTER (OUTPATIENT)
Dept: MAMMOGRAPHY | Facility: MEDICAL CENTER | Age: 76
Discharge: HOME | End: 2021-02-26
Payer: MEDICARE

## 2021-02-26 DIAGNOSIS — Z12.31 ENCOUNTER FOR SCREENING MAMMOGRAM FOR MALIGNANT NEOPLASM OF BREAST: ICD-10-CM

## 2021-02-26 PROCEDURE — 77063 BREAST TOMOSYNTHESIS BI: CPT

## 2021-02-26 PROCEDURE — 77067 SCR MAMMO BI INCL CAD: CPT

## 2021-03-02 ENCOUNTER — HOSPITAL ENCOUNTER (OUTPATIENT)
Dept: ULTRASOUND IMAGING | Facility: CLINIC | Age: 76
Discharge: HOME | End: 2021-03-02
Payer: MEDICARE

## 2021-03-02 ENCOUNTER — TRANSCRIBE ORDERS (OUTPATIENT)
Dept: MAMMOGRAPHY | Facility: CLINIC | Age: 76
End: 2021-03-02

## 2021-03-02 DIAGNOSIS — R92.8 ABNORMAL SCREENING MAMMOGRAM: ICD-10-CM

## 2021-03-02 PROCEDURE — 76642 ULTRASOUND BREAST LIMITED: CPT

## 2021-03-03 ENCOUNTER — TELEPHONE (OUTPATIENT)
Dept: INTERNAL MEDICINE CLINIC | Facility: CLINIC | Age: 76
End: 2021-03-03

## 2021-03-03 ENCOUNTER — TRANSCRIBE ORDERS (OUTPATIENT)
Dept: ADMINISTRATIVE | Facility: HOSPITAL | Age: 76
End: 2021-03-03

## 2021-03-03 DIAGNOSIS — N60.01 CYST OF RIGHT BREAST: Primary | ICD-10-CM

## 2021-03-03 NOTE — TELEPHONE ENCOUNTER
----- Message from Sadie Arenas MD sent at 3/2/2021  6:09 PM EST -----  Repeat u/s breast in 6 mths as per radiologist

## 2021-04-04 DIAGNOSIS — I10 ESSENTIAL HYPERTENSION: ICD-10-CM

## 2021-04-04 RX ORDER — LISINOPRIL 5 MG/1
TABLET ORAL
Qty: 90 TABLET | Refills: 0 | Status: SHIPPED | OUTPATIENT
Start: 2021-04-04 | End: 2021-06-29

## 2021-04-26 ENCOUNTER — OFFICE VISIT (OUTPATIENT)
Dept: INTERNAL MEDICINE CLINIC | Facility: CLINIC | Age: 76
End: 2021-04-26
Payer: MEDICARE

## 2021-04-26 VITALS
HEIGHT: 62 IN | TEMPERATURE: 97.6 F | WEIGHT: 113 LBS | HEART RATE: 67 BPM | BODY MASS INDEX: 20.8 KG/M2 | SYSTOLIC BLOOD PRESSURE: 107 MMHG | DIASTOLIC BLOOD PRESSURE: 66 MMHG | OXYGEN SATURATION: 98 %

## 2021-04-26 DIAGNOSIS — E55.9 VITAMIN D DEFICIENCY: ICD-10-CM

## 2021-04-26 DIAGNOSIS — Z13.29 THYROID DISORDER SCREEN: ICD-10-CM

## 2021-04-26 DIAGNOSIS — E78.5 HYPERLIPIDEMIA, UNSPECIFIED HYPERLIPIDEMIA TYPE: Primary | ICD-10-CM

## 2021-04-26 DIAGNOSIS — I10 ESSENTIAL HYPERTENSION: ICD-10-CM

## 2021-04-26 PROCEDURE — 99214 OFFICE O/P EST MOD 30 MIN: CPT | Performed by: INTERNAL MEDICINE

## 2021-04-26 NOTE — PROGRESS NOTES
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 blood pressure check she has done routine medical and surgical nursing Geriatrics and I believe home care and some insurance work she has a friend that she spends a lot of time with he likes to travel she has done hiking kayaking and apparently ice climbing as well she has had a bit of a blood pressure problem for which she uses small doses of lisinopril sometimes her blood pressures at home will rise to 150 or little bit more systolic she otherwise is active and vigorous and feels well we reviewed all of her recent lab studies which are quite satisfactory she did get both COVID inoculation so    The following portions of the patient's history were reviewed and updated as appropriate: allergies, current medications, past family history, past medical history, past social history, past surgical history and problem list     Review of Systems   benign    Objective:      /66 (BP Location: Left arm, Patient Position: Sitting, Cuff Size: Standard)   Pulse 67   Temp 97 6 °F (36 4 °C) (Skin)   Ht 5' 1 5" (1 562 m)   Wt 51 3 kg (113 lb)   SpO2 98%   BMI 21 01 kg/m²   BP Readings from Last 3 Encounters:   04/26/21 107/66   01/15/21 142/78   11/05/20 118/78      Wt Readings from Last 3 Encounters:   04/26/21 51 3 kg (113 lb)   02/26/21 (P) 49 9 kg (110 lb)   01/15/21 49 9 kg (110 lb)      BMI: Estimated body mass index is 21 01 kg/m² as calculated from the following: Height as of this encounter: 5' 1 5" (1 562 m)  Weight as of this encounter: 51 3 kg (113 lb)  BSA: Estimated body surface area is 1 49 meters squared as calculated from the following:    Height as of this encounter: 5' 1 5" (1 562 m)  Weight as of this encounter: 51 3 kg (113 lb)  Physical Exam   Denny Pettit is a petite athletic slim lady she is in no distress she is bright interactive and energetic    Her blood pressure is 108/68 taken in the sitting position 110/70 supine the carotids are quiet cardiac examination reveals regular rhythm physiologic rate no murmurs or gallops the lungs are clear the abdomen is soft and no palpable organ enlargement or masses skin is warm and dry gait station  Language and mentation are normal patient is quite stable to congratulate her healthy and vigorous lifestyle will see her in 6 months lab studies will be done before her next visit I told her that if her blood pressure does dip because of the hot weather she is certainly welcome to either discontinue or halve  the lisinopril      Current Outpatient Medications:     estradiol (ESTRACE) 0 1 mg/g vaginal cream, Insert 0 5 g into the vagina 2 (two) times a week Use every night for 2 weeks and then twice a week , Disp: 42 5 g, Rfl: 1    fluticasone (FLONASE) 50 mcg/act nasal spray, 1 spray into each nostril as needed for allergies, Disp: 1 Bottle, Rfl: 2    ibuprofen (MOTRIN) 200 mg tablet, Take 200 mg by mouth, Disp: , Rfl:     lisinopril (ZESTRIL) 5 mg tablet, TAKE 1 TABLET BY MOUTH EVERY DAY, Disp: 90 tablet, Rfl: 0    Lutein 20 MG CAPS, Take by mouth, Disp: , Rfl:     pravastatin (PRAVACHOL) 10 mg tablet, TAKE 1 TABLET BY MOUTH 3 TIMES A WEEK, Disp: 36 tablet, Rfl: 2    Calcium Carbonate-Vitamin D3 (CALCIUM 600/VITAMIN D) 600-400 MG-UNIT TABS, Take by mouth, Disp: , Rfl:     famotidine (PEPCID) 40 MG tablet, Take 0 5 tablets (20 mg total) by mouth daily at bedtime as needed for heartburn for up to 10 days, Disp: 10 tablet, Rfl: 0    This note was completed in part utilizing M-[employer] Flu[employer] Direct Software  Grammatical errors, random word insertions, spelling mistakes, and incomplete sentences can be an occasional consequence of this system secondary to software limitations, ambient noise, and hardware issues  If you have any question or concerns about the content, text, or information contained within the body of this dictation, please contact the provider for clarification

## 2021-06-03 DIAGNOSIS — E78.5 HYPERLIPIDEMIA, UNSPECIFIED HYPERLIPIDEMIA TYPE: ICD-10-CM

## 2021-06-03 RX ORDER — PRAVASTATIN SODIUM 10 MG
TABLET ORAL
Qty: 36 TABLET | Refills: 2 | Status: SHIPPED | OUTPATIENT
Start: 2021-06-03 | End: 2022-03-01 | Stop reason: SDUPTHER

## 2021-06-29 DIAGNOSIS — I10 ESSENTIAL HYPERTENSION: ICD-10-CM

## 2021-06-29 RX ORDER — LISINOPRIL 5 MG/1
TABLET ORAL
Qty: 90 TABLET | Refills: 0 | Status: SHIPPED | OUTPATIENT
Start: 2021-06-29 | End: 2021-11-15 | Stop reason: ALTCHOICE

## 2021-08-07 ENCOUNTER — OFFICE VISIT (OUTPATIENT)
Dept: URGENT CARE | Facility: MEDICAL CENTER | Age: 76
End: 2021-08-07
Payer: MEDICARE

## 2021-08-07 VITALS
WEIGHT: 112 LBS | OXYGEN SATURATION: 98 % | SYSTOLIC BLOOD PRESSURE: 150 MMHG | TEMPERATURE: 97.3 F | HEART RATE: 65 BPM | HEIGHT: 62 IN | RESPIRATION RATE: 20 BRPM | BODY MASS INDEX: 20.61 KG/M2 | DIASTOLIC BLOOD PRESSURE: 74 MMHG

## 2021-08-07 DIAGNOSIS — T30.4 CHEMICAL BURN: Primary | ICD-10-CM

## 2021-08-07 PROCEDURE — 99213 OFFICE O/P EST LOW 20 MIN: CPT | Performed by: PHYSICIAN ASSISTANT

## 2021-08-07 PROCEDURE — G0463 HOSPITAL OUTPT CLINIC VISIT: HCPCS | Performed by: PHYSICIAN ASSISTANT

## 2021-08-07 NOTE — PROGRESS NOTES
[employer]        NAME: Santhosh Shah is a 67 y o  female  : 1945    MRN: [id number]  DATE: 2021  TIME: 2:42 PM    Assessment and Plan   Chemical burn [T30 4]  1  Chemical burn           Patient Instructions       Patient was sent to ED for further evaluation  Chief Complaint     Chief Complaint   Patient presents with    Chemical Burn     Patient states she was [place] [address] [place] [facility] with "elephant snot" and must have brushed up against one of the rock  She didn't notice anything until she went home to shower and noticed the dark area on her L upper thigh              History of Present Illness       Patient is here today for left upper thigh posterior chemical burn  Patient reports she was cleaning up the graffiti on "[facility]" with elephant glue when she must have got it on left posterior thigh  Patient reports pain is 1/10  Patient reports last Tetanus was in 2018  Patient did take a shower after this incident  Review of Systems   Review of Systems   Constitutional: Negative  Respiratory: Negative  Cardiovascular: Negative  Skin: Positive for wound          Chemical burn left upper thigh         Current Medications       Current Outpatient Medications:     Calcium Carbonate-Vitamin D3 (CALCIUM 600/VITAMIN D) 600-400 MG-UNIT TABS, Take by mouth, Disp: , Rfl:     estradiol (ESTRACE) 0 1 mg/g vaginal cream, Insert 0 5 g into the vagina 2 (two) times a week Use every night for 2 weeks and then twice a week , Disp: 42 5 g, Rfl: 1    fluticasone (FLONASE) 50 mcg/act nasal spray, 1 spray into each nostril as needed for allergies, Disp: 1 Bottle, Rfl: 2    ibuprofen (MOTRIN) 200 mg tablet, Take 200 mg by mouth, Disp: , Rfl:     Lutein 20 MG CAPS, Take by mouth, Disp: , Rfl:     pravastatin (PRAVACHOL) 10 mg tablet, TAKE 1 TABLET BY MOUTH 3 TIMES A WEEK, Disp: 36 tablet, Rfl: 2    famotidine (PEPCID) 40 MG tablet, Take 0 5 tablets (20 mg total) by mouth daily at bedtime as needed for heartburn for up to 10 days, Disp: 10 tablet, Rfl: 0    lisinopril (ZESTRIL) 5 mg tablet, TAKE 1 TABLET BY MOUTH EVERY DAY (Patient not taking: Reported on 8/7/2021), Disp: 90 tablet, Rfl: 0    Current Allergies     Allergies as of 08/07/2021 - Reviewed 08/07/2021   Allergen Reaction Noted    Erythromycin Rash 02/12/2018            The following portions of the patient's history were reviewed and updated as appropriate: allergies, current medications, past family history, past medical history, past social history, past surgical history and problem list      Past Medical History:   Diagnosis Date    Arthritis     Hyperlipidemia     Hypertension        Past Surgical History:   Procedure Laterality Date    BLADDER SURGERY      LAST ASSESSED [id number]    DILATION AND CURETTAGE OF UTERUS      LAST ASSESSED [id number]    TOOTH EXTRACTION      LAST ASSESSED [id number]       Family History   Problem Relation Age of Onset    Dementia Mother     Hypertension Mother     Skin cancer Mother         age unknown   Hemal Torres No Known Problems Father     No Known Problems Daughter     No Known Problems Maternal Grandmother     No Known Problems Maternal Grandfather     No Known Problems Paternal Grandmother     No Known Problems Paternal Grandfather     No Known Problems Daughter     No Known Problems Paternal Aunt          Medications have been verified  Objective   /74   Pulse 65   Temp (!) 97 3 °F (36 3 °C)   Resp 20   Ht 5' 2" (1 575 m)   Wt 50 8 kg (112 lb)   SpO2 98%   BMI 20 49 kg/m²   No LMP recorded  Patient is postmenopausal        Physical Exam     Physical Exam  Constitutional:       Appearance: She is normal weight  HENT:      Head: Normocephalic  Cardiovascular:      Rate and Rhythm: Normal rate and regular rhythm  Heart sounds: Normal heart sounds  Pulmonary:      Breath sounds: Normal breath sounds     Musculoskeletal: Comments: Motor intact in lower extremity  Skin:     Comments: 2*3 inch chemical burn in left upper thigh  Burn is not raised  Mild redness surrounding burn  Neurological:      General: No focal deficit present  Mental Status: She is alert and oriented to person, place, and time     Psychiatric:         Mood and Affect: Mood normal          Behavior: Behavior normal

## 2021-09-03 ENCOUNTER — HOSPITAL ENCOUNTER (OUTPATIENT)
Dept: ULTRASOUND IMAGING | Facility: CLINIC | Age: 76
Discharge: HOME | End: 2021-09-03
Payer: MEDICARE

## 2021-09-03 VITALS — BODY MASS INDEX: 20.61 KG/M2 | WEIGHT: 112 LBS | HEIGHT: 62 IN

## 2021-09-03 DIAGNOSIS — N60.01 CYST OF RIGHT BREAST: ICD-10-CM

## 2021-09-03 PROCEDURE — 76642 ULTRASOUND BREAST LIMITED: CPT

## 2021-10-21 ENCOUNTER — APPOINTMENT (OUTPATIENT)
Dept: LAB | Facility: CLINIC | Age: 76
End: 2021-10-21
Payer: MEDICARE

## 2021-10-21 DIAGNOSIS — E78.5 HYPERLIPIDEMIA, UNSPECIFIED HYPERLIPIDEMIA TYPE: ICD-10-CM

## 2021-10-21 DIAGNOSIS — I10 ESSENTIAL HYPERTENSION: ICD-10-CM

## 2021-10-21 DIAGNOSIS — E55.9 VITAMIN D DEFICIENCY: ICD-10-CM

## 2021-10-21 DIAGNOSIS — Z13.29 THYROID DISORDER SCREEN: ICD-10-CM

## 2021-10-21 LAB
25(OH)D3 SERPL-MCNC: 40.2 NG/ML (ref 30–100)
ALBUMIN SERPL BCP-MCNC: 3.4 G/DL (ref 3.5–5)
ALP SERPL-CCNC: 70 U/L (ref 46–116)
ALT SERPL W P-5'-P-CCNC: 22 U/L (ref 12–78)
ANION GAP SERPL CALCULATED.3IONS-SCNC: 3 MMOL/L (ref 4–13)
AST SERPL W P-5'-P-CCNC: 16 U/L (ref 5–45)
BASOPHILS # BLD AUTO: 0.06 THOUSANDS/ΜL (ref 0–0.1)
BASOPHILS NFR BLD AUTO: 1 % (ref 0–1)
BILIRUB SERPL-MCNC: 0.94 MG/DL (ref 0.2–1)
BUN SERPL-MCNC: 28 MG/DL (ref 5–25)
CALCIUM ALBUM COR SERPL-MCNC: 9.5 MG/DL (ref 8.3–10.1)
CALCIUM SERPL-MCNC: 9 MG/DL (ref 8.3–10.1)
CHLORIDE SERPL-SCNC: 108 MMOL/L (ref 100–108)
CHOLEST SERPL-MCNC: 210 MG/DL (ref 50–200)
CO2 SERPL-SCNC: 29 MMOL/L (ref 21–32)
CREAT SERPL-MCNC: 0.86 MG/DL (ref 0.6–1.3)
EOSINOPHIL # BLD AUTO: 0.18 THOUSAND/ΜL (ref 0–0.61)
EOSINOPHIL NFR BLD AUTO: 4 % (ref 0–6)
ERYTHROCYTE [DISTWIDTH] IN BLOOD BY AUTOMATED COUNT: 13.2 % (ref 11.6–15.1)
GFR SERPL CREATININE-BSD FRML MDRD: 65 ML/MIN/1.73SQ M
GLUCOSE P FAST SERPL-MCNC: 91 MG/DL (ref 65–99)
HCT VFR BLD AUTO: 40 % (ref 34.8–46.1)
HDLC SERPL-MCNC: 84 MG/DL
HGB BLD-MCNC: 12.5 G/DL (ref 11.5–15.4)
IMM GRANULOCYTES # BLD AUTO: 0.02 THOUSAND/UL (ref 0–0.2)
IMM GRANULOCYTES NFR BLD AUTO: 1 % (ref 0–2)
LDLC SERPL CALC-MCNC: 114 MG/DL (ref 0–100)
LYMPHOCYTES # BLD AUTO: 1.19 THOUSANDS/ΜL (ref 0.6–4.47)
LYMPHOCYTES NFR BLD AUTO: 28 % (ref 14–44)
MCH RBC QN AUTO: 30.3 PG (ref 26.8–34.3)
MCHC RBC AUTO-ENTMCNC: 31.3 G/DL (ref 31.4–37.4)
MCV RBC AUTO: 97 FL (ref 82–98)
MONOCYTES # BLD AUTO: 0.31 THOUSAND/ΜL (ref 0.17–1.22)
MONOCYTES NFR BLD AUTO: 7 % (ref 4–12)
NEUTROPHILS # BLD AUTO: 2.43 THOUSANDS/ΜL (ref 1.85–7.62)
NEUTS SEG NFR BLD AUTO: 59 % (ref 43–75)
NONHDLC SERPL-MCNC: 126 MG/DL
NRBC BLD AUTO-RTO: 0 /100 WBCS
PLATELET # BLD AUTO: 157 THOUSANDS/UL (ref 149–390)
PMV BLD AUTO: 13.2 FL (ref 8.9–12.7)
POTASSIUM SERPL-SCNC: 4 MMOL/L (ref 3.5–5.3)
PROT SERPL-MCNC: 6.9 G/DL (ref 6.4–8.2)
RBC # BLD AUTO: 4.12 MILLION/UL (ref 3.81–5.12)
SODIUM SERPL-SCNC: 140 MMOL/L (ref 136–145)
TRIGL SERPL-MCNC: 60 MG/DL
TSH SERPL DL<=0.05 MIU/L-ACNC: 1.32 UIU/ML (ref 0.36–3.74)
WBC # BLD AUTO: 4.19 THOUSAND/UL (ref 4.31–10.16)

## 2021-10-21 PROCEDURE — 80061 LIPID PANEL: CPT

## 2021-10-21 PROCEDURE — 80053 COMPREHEN METABOLIC PANEL: CPT

## 2021-10-21 PROCEDURE — 85025 COMPLETE CBC W/AUTO DIFF WBC: CPT

## 2021-10-21 PROCEDURE — 82306 VITAMIN D 25 HYDROXY: CPT

## 2021-10-21 PROCEDURE — 36415 COLL VENOUS BLD VENIPUNCTURE: CPT

## 2021-10-21 PROCEDURE — 84443 ASSAY THYROID STIM HORMONE: CPT

## 2021-10-22 ENCOUNTER — APPOINTMENT (OUTPATIENT)
Dept: LAB | Facility: CLINIC | Age: 76
End: 2021-10-22
Payer: MEDICARE

## 2021-10-22 LAB
BACTERIA UR QL AUTO: ABNORMAL /HPF
BILIRUB UR QL STRIP: NEGATIVE
CLARITY UR: ABNORMAL
COLOR UR: YELLOW
GLUCOSE UR STRIP-MCNC: NEGATIVE MG/DL
HGB UR QL STRIP.AUTO: NEGATIVE
HYALINE CASTS #/AREA URNS LPF: ABNORMAL /LPF
KETONES UR STRIP-MCNC: NEGATIVE MG/DL
LEUKOCYTE ESTERASE UR QL STRIP: ABNORMAL
NITRITE UR QL STRIP: POSITIVE
NON-SQ EPI CELLS URNS QL MICRO: ABNORMAL /HPF
PH UR STRIP.AUTO: 6 [PH]
PROT UR STRIP-MCNC: NEGATIVE MG/DL
RBC #/AREA URNS AUTO: ABNORMAL /HPF
SP GR UR STRIP.AUTO: 1.02 (ref 1–1.03)
UROBILINOGEN UR QL STRIP.AUTO: 0.2 E.U./DL
WBC #/AREA URNS AUTO: ABNORMAL /HPF

## 2021-10-22 PROCEDURE — 81001 URINALYSIS AUTO W/SCOPE: CPT | Performed by: INTERNAL MEDICINE

## 2021-10-26 ENCOUNTER — OFFICE VISIT (OUTPATIENT)
Dept: INTERNAL MEDICINE CLINIC | Facility: CLINIC | Age: 76
End: 2021-10-26
Payer: MEDICARE

## 2021-10-26 VITALS
TEMPERATURE: 98.3 F | HEIGHT: 62 IN | WEIGHT: 115 LBS | OXYGEN SATURATION: 99 % | HEART RATE: 57 BPM | BODY MASS INDEX: 21.16 KG/M2

## 2021-10-26 DIAGNOSIS — Z23 FLU VACCINE NEED: ICD-10-CM

## 2021-10-26 DIAGNOSIS — Z00.00 MEDICARE ANNUAL WELLNESS VISIT, SUBSEQUENT: Primary | ICD-10-CM

## 2021-10-26 PROCEDURE — 99214 OFFICE O/P EST MOD 30 MIN: CPT | Performed by: INTERNAL MEDICINE

## 2021-10-26 PROCEDURE — 90662 IIV NO PRSV INCREASED AG IM: CPT | Performed by: INTERNAL MEDICINE

## 2021-10-26 PROCEDURE — G0008 ADMIN INFLUENZA VIRUS VAC: HCPCS | Performed by: INTERNAL MEDICINE

## 2021-11-15 ENCOUNTER — ANNUAL EXAM (OUTPATIENT)
Dept: GYNECOLOGY | Facility: CLINIC | Age: 76
End: 2021-11-15
Payer: MEDICARE

## 2021-11-15 VITALS
SYSTOLIC BLOOD PRESSURE: 122 MMHG | BODY MASS INDEX: 21.77 KG/M2 | HEIGHT: 61 IN | DIASTOLIC BLOOD PRESSURE: 88 MMHG | WEIGHT: 115.3 LBS

## 2021-11-15 DIAGNOSIS — Z12.31 SCREENING MAMMOGRAM FOR BREAST CANCER: ICD-10-CM

## 2021-11-15 DIAGNOSIS — N95.2 VAGINAL ATROPHY: ICD-10-CM

## 2021-11-15 DIAGNOSIS — Z01.411 ENCOUNTER FOR GYNECOLOGICAL EXAMINATION (GENERAL) (ROUTINE) WITH ABNORMAL FINDINGS: Primary | ICD-10-CM

## 2021-11-15 DIAGNOSIS — N32.81 OAB (OVERACTIVE BLADDER): ICD-10-CM

## 2021-11-15 DIAGNOSIS — Z78.0 MENOPAUSE: ICD-10-CM

## 2021-11-15 DIAGNOSIS — Z13.820 OSTEOPOROSIS SCREENING: ICD-10-CM

## 2021-11-15 DIAGNOSIS — G47.9 SLEEP DISTURBANCE: ICD-10-CM

## 2021-11-15 PROCEDURE — G0101 CA SCREEN;PELVIC/BREAST EXAM: HCPCS | Performed by: OBSTETRICS & GYNECOLOGY

## 2021-12-21 ENCOUNTER — OFFICE VISIT (OUTPATIENT)
Dept: URGENT CARE | Facility: MEDICAL CENTER | Age: 76
End: 2021-12-21
Payer: MEDICARE

## 2021-12-21 VITALS
DIASTOLIC BLOOD PRESSURE: 74 MMHG | WEIGHT: 113 LBS | HEART RATE: 64 BPM | SYSTOLIC BLOOD PRESSURE: 151 MMHG | OXYGEN SATURATION: 99 % | HEIGHT: 61 IN | BODY MASS INDEX: 21.34 KG/M2 | TEMPERATURE: 98.1 F | RESPIRATION RATE: 20 BRPM

## 2021-12-21 DIAGNOSIS — H60.502 ACUTE OTITIS EXTERNA OF LEFT EAR, UNSPECIFIED TYPE: Primary | ICD-10-CM

## 2021-12-21 PROCEDURE — G0463 HOSPITAL OUTPT CLINIC VISIT: HCPCS | Performed by: PHYSICIAN ASSISTANT

## 2021-12-21 PROCEDURE — 99213 OFFICE O/P EST LOW 20 MIN: CPT | Performed by: PHYSICIAN ASSISTANT

## 2021-12-21 RX ORDER — OFLOXACIN 3 MG/ML
10 SOLUTION AURICULAR (OTIC) DAILY
Qty: 5 ML | Refills: 0 | Status: SHIPPED | OUTPATIENT
Start: 2021-12-21 | End: 2021-12-28

## 2022-01-11 ENCOUNTER — HOSPITAL ENCOUNTER (OUTPATIENT)
Dept: BONE DENSITY | Facility: MEDICAL CENTER | Age: 77
Discharge: HOME | End: 2022-01-11
Payer: MEDICARE

## 2022-01-11 DIAGNOSIS — Z78.0 MENOPAUSE: ICD-10-CM

## 2022-01-11 DIAGNOSIS — Z13.820 OSTEOPOROSIS SCREENING: ICD-10-CM

## 2022-01-11 PROCEDURE — 77080 DXA BONE DENSITY AXIAL: CPT

## 2022-02-25 ENCOUNTER — OFFICE VISIT (OUTPATIENT)
Dept: FAMILY MEDICINE CLINIC | Facility: CLINIC | Age: 77
End: 2022-02-25
Payer: MEDICARE

## 2022-02-25 VITALS
DIASTOLIC BLOOD PRESSURE: 78 MMHG | SYSTOLIC BLOOD PRESSURE: 140 MMHG | TEMPERATURE: 98.7 F | HEIGHT: 61 IN | WEIGHT: 113.2 LBS | OXYGEN SATURATION: 95 % | HEART RATE: 65 BPM | BODY MASS INDEX: 21.37 KG/M2

## 2022-02-25 DIAGNOSIS — R06.02 SOB (SHORTNESS OF BREATH): ICD-10-CM

## 2022-02-25 DIAGNOSIS — Z76.89 ENCOUNTER TO ESTABLISH CARE: ICD-10-CM

## 2022-02-25 DIAGNOSIS — I10 ESSENTIAL HYPERTENSION: Primary | ICD-10-CM

## 2022-02-25 DIAGNOSIS — E78.5 HYPERLIPIDEMIA, UNSPECIFIED HYPERLIPIDEMIA TYPE: ICD-10-CM

## 2022-02-25 PROCEDURE — 99203 OFFICE O/P NEW LOW 30 MIN: CPT | Performed by: NURSE PRACTITIONER

## 2022-02-25 NOTE — PROGRESS NOTES
[facility]    ASSESSMENT AND PLAN     1  Essential hypertension  Noted slightly elevated today and at prior visit in December  Has history of HTN  Was on Lisinopril for years - recently weaned self off a few months ago  C/O occasional SOB  Believes she may have noted since stopping  Occurs at no specific time- not exertional   Pt is usually extremely active - travels, hikes, skis  Does note she has been more sedentary the last few months  She has been increasing her activity  She has noted the SOB lessening as she is becoming more active again  Assessment today  unremarkable today with exception of slightly elevated BP: 140/78  EKG completed  She will continue to monitor her BP and f/u in office in the next few weeks for recheck  Monitor pressures at home - bring to appt  F/U sooner with problems/concerns  Note: may consider restarting Lisinopril or low dose HCTZ  ER precautions with any acute change    - POCT ECG- SB w/ premature atrial complexes    2  Hyperlipidemia, unspecified hyperlipidemia type  Stable  Compliant with Pravachol 10  Last lipid panel 10/2021: Total - 210  LDL - 114  HDL - 84  Repeat in May    3  SOB (shortness of breath)    - POCT ECG    4  Encounter to establish care  Establishing primary care in our office          SUBJECTIVE       Patient ID: Mckenna Fermin is a 67 y o  female  Chief Complaint   Patient presents with   [facility]     Previous PCP retired (Dr Joann Ariza)    Hypertension       HISTORY OF PRESENT ILLNESS    Presents today to establish primary care  Notes history of HTN  Was on low dose Lisinopril for years  Weaned over several months ago  The following portions of the patient's history were reviewed and updated as appropriate: allergies, current medications, past family history, past medical history, past social history, past surgical history and problem list     REVIEW OF SYSTEMS  Review of Systems   Constitutional: Negative  HENT: Negative  Respiratory: Positive for shortness of breath (occasional)  Negative for cough, chest tightness and wheezing  Cardiovascular: Negative  Negative for chest pain, palpitations and leg swelling  Gastrointestinal: Negative  Genitourinary: Negative  Musculoskeletal: Negative  Neurological: Negative for dizziness and headaches  Psychiatric/Behavioral: Negative  OBJECTIVE      VITAL SIGNS  /78 (BP Location: Left arm)   Pulse 65   Temp 98 7 °F (37 1 °C)   Ht 5' 1" (1 549 m)   Wt 51 3 kg (113 lb 3 2 oz)   SpO2 95%   BMI 21 39 kg/m²     CURRENT MEDICATIONS    Current Outpatient Medications:     Calcium Carbonate-Vitamin D3 (CALCIUM 600/VITAMIN D) 600-400 MG-UNIT TABS, Take by mouth, Disp: , Rfl:     Cholecalciferol 25 MCG (1000 UT) tablet, Take 1,000 Units by mouth daily, Disp: , Rfl:     estradiol (ESTRACE) 0 1 mg/g vaginal cream, Insert 0 5 g into the vagina 2 (two) times a week Use every night for 2 weeks and then twice a week , Disp: 42 5 g, Rfl: 1    fluticasone (FLONASE) 50 mcg/act nasal spray, 1 spray into each nostril as needed for allergies, Disp: 1 Bottle, Rfl: 2    ibuprofen (MOTRIN) 200 mg tablet, Take 200 mg by mouth, Disp: , Rfl:     Lutein 20 MG CAPS, Take by mouth, Disp: , Rfl:     Polyethyl Glycol-Propyl Glycol (SYSTANE OP), Apply to eye, Disp: , Rfl:     pravastatin (PRAVACHOL) 10 mg tablet, TAKE 1 TABLET BY MOUTH 3 TIMES A WEEK, Disp: 36 tablet, Rfl: 2      PHYSICAL EXAMINATION   Physical Exam  Vitals and nursing note reviewed  Constitutional:       General: She is not in acute distress  Appearance: Normal appearance  She is well-developed and well-groomed  She is not ill-appearing  HENT:      Head: Normocephalic and atraumatic        Right Ear: Tympanic membrane, ear canal and external ear normal       Left Ear: Tympanic membrane, ear canal and external ear normal       Nose: Nose normal    Eyes:      Conjunctiva/sclera: Conjunctivae normal       Pupils: Pupils are equal, round, and reactive to light  Neck:      Vascular: No carotid bruit  Cardiovascular:      Rate and Rhythm: Normal rate and regular rhythm  Heart sounds: Normal heart sounds  No murmur heard  Pulmonary:      Effort: Pulmonary effort is normal  No respiratory distress  Breath sounds: Normal breath sounds and air entry  Musculoskeletal:      Right lower leg: No edema  Left lower leg: No edema  Lymphadenopathy:      Cervical: No cervical adenopathy  Skin:     General: Skin is warm and dry  Neurological:      General: No focal deficit present  Mental Status: She is alert and oriented to person, place, and time  Psychiatric:         Attention and Perception: Attention normal          Mood and Affect: Mood normal          Speech: Speech normal          Behavior: Behavior normal          Thought Content:  Thought content normal          Judgment: Judgment normal

## 2022-03-01 DIAGNOSIS — E78.5 HYPERLIPIDEMIA, UNSPECIFIED HYPERLIPIDEMIA TYPE: ICD-10-CM

## 2022-03-01 RX ORDER — PRAVASTATIN SODIUM 10 MG
10 TABLET ORAL 3 TIMES WEEKLY
Qty: 36 TABLET | Refills: 1 | Status: SHIPPED | OUTPATIENT
Start: 2022-03-02

## 2022-03-08 ENCOUNTER — HOSPITAL ENCOUNTER (OUTPATIENT)
Dept: MAMMOGRAPHY | Facility: MEDICAL CENTER | Age: 77
Discharge: HOME | End: 2022-03-08
Payer: MEDICARE

## 2022-03-08 VITALS — BODY MASS INDEX: 21.34 KG/M2 | WEIGHT: 113 LBS | HEIGHT: 61 IN

## 2022-03-08 DIAGNOSIS — Z12.31 SCREENING MAMMOGRAM FOR BREAST CANCER: ICD-10-CM

## 2022-03-08 PROCEDURE — 77063 BREAST TOMOSYNTHESIS BI: CPT

## 2022-03-08 PROCEDURE — 77067 SCR MAMMO BI INCL CAD: CPT

## 2022-03-10 PROCEDURE — 93000 ELECTROCARDIOGRAM COMPLETE: CPT | Performed by: NURSE PRACTITIONER

## 2022-03-21 ENCOUNTER — OFFICE VISIT (OUTPATIENT)
Dept: FAMILY MEDICINE CLINIC | Facility: CLINIC | Age: 77
End: 2022-03-21
Payer: MEDICARE

## 2022-03-21 VITALS
RESPIRATION RATE: 16 BRPM | SYSTOLIC BLOOD PRESSURE: 130 MMHG | TEMPERATURE: 98.1 F | DIASTOLIC BLOOD PRESSURE: 80 MMHG | BODY MASS INDEX: 21.83 KG/M2 | WEIGHT: 115.6 LBS | HEIGHT: 61 IN | OXYGEN SATURATION: 100 % | HEART RATE: 60 BPM

## 2022-03-21 DIAGNOSIS — E78.5 HYPERLIPIDEMIA, UNSPECIFIED HYPERLIPIDEMIA TYPE: ICD-10-CM

## 2022-03-21 DIAGNOSIS — R00.2 PALPITATION: ICD-10-CM

## 2022-03-21 DIAGNOSIS — H26.9 CATARACT OF BOTH EYES, UNSPECIFIED CATARACT TYPE: ICD-10-CM

## 2022-03-21 DIAGNOSIS — Z01.818 PRE-OP EVALUATION: Primary | ICD-10-CM

## 2022-03-21 DIAGNOSIS — I10 ESSENTIAL HYPERTENSION: ICD-10-CM

## 2022-03-21 PROCEDURE — 99214 OFFICE O/P EST MOD 30 MIN: CPT | Performed by: NURSE PRACTITIONER

## 2022-03-21 RX ORDER — LISINOPRIL 2.5 MG/1
2.5 TABLET ORAL DAILY
Qty: 30 TABLET | Refills: 5 | Status: SHIPPED | OUTPATIENT
Start: 2022-03-21

## 2022-03-21 NOTE — PROGRESS NOTES
[facility]    ASSESSMENT AND PLAN     1  Pre-op evaluation  Pre op clearance  Scheduled for cataract sx - B/L  PMH and medications reviewed  No PATs required  HTN: 134/80 today  Trends reviewed  Home pressure: BP average 130-150/80s restarting low dose lisinopril 2 5 today  (Note: SOB has resolved since increasing activity again  Recent snowshoe hiking without issue  But continues to note occasional palpitations  EKG last visit:Sinus bradycardia with premature atrial complexes  Possible left atrial enlargement  Borderline  Will refer to Cardiology today for evaluation  ASCVD risk elevated: 27 4)  Hyperlipidemia: Stable    Total 210  Pravachol 10/ 3 times week  Minimal risk surgery  Pt cleared today form PCP standpoint    2  Cataract of both eyes, unspecified cataract type      3  Essential hypertension  Borderline since off Lisinopril  Will restart today  F/U 4 weeks for BP recheck  Sooner with concerns  (Reassess for  anxiety at follow up)  - Comprehensive metabolic panel; Future  - lisinopril (ZESTRIL) 2 5 mg tablet; Take 1 tablet (2 5 mg total) by mouth daily  Dispense: 30 tablet; Refill: 5  - Ambulatory Referral to Cardiology; Future    4  Hyperlipidemia, unspecified hyperlipidemia type  Recheck lipid panel prior to next follow up    - Lipid panel; Future  - Ambulatory Referral to Cardiology; Future    5  Palpitation  Intermittent  Will refer to cardiology today for evaluation and further testing if indicated    - Ambulatory Referral to Cardiology; Future            SUBJECTIVE       Patient ID: Irineo Trevino is a 67 y o  female      Chief Complaint   Patient presents with    Pre-op Exam     cataract surg R; 4 5 22 and L: 4 19 22       HISTORY OF PRESENT ILLNESS    Pre op evaluation        The following portions of the patient's history were reviewed and updated as appropriate: allergies, current medications, past family history, past medical history, past social history, past surgical history and problem list     SURGEON:Dr Francine Moore    SURGERY/PROCEDURE: cataract        DATE OF SURGERY:    Right: 4/5/2022   Left: 4/19/2022    PRIOR ANESTHESIA:yes    COMPLICATION: no    BLEEDING PROBLEM: no    PERTINENT PMH: no    EXERCISE CAPACITY:   CAN WALK 4 BLOCKS AND OR CLIMB 2 FLIGHTS: Yes    HOME LIVING SITUATION SAFE AND SECURE: Yes      TOBACCO: no     ETOH: no     ILLEGAL DRUGS: no    REVIEW OF SYSTEMS  Review of Systems   Constitutional: Negative  HENT: Negative  Eyes:        Cataracts   Respiratory: Negative  Negative for shortness of breath  Cardiovascular: Positive for palpitations  Gastrointestinal: Negative  Genitourinary: Negative  Musculoskeletal: Negative  Neurological: Negative  Negative for dizziness, weakness, light-headedness and headaches  Psychiatric/Behavioral: Negative          OBJECTIVE      VITAL SIGNS  /80 (BP Location: Left arm, Patient Position: Sitting, Cuff Size: Adult)   Pulse 60   Temp 98 1 °F (36 7 °C) (Temporal)   Resp 16   Ht 5' 1" (1 549 m)   Wt 52 4 kg (115 lb 9 6 oz)   SpO2 100%   BMI 21 84 kg/m²     CURRENT MEDICATIONS    Current Outpatient Medications:     Calcium Carbonate-Vitamin D3 (CALCIUM 600/VITAMIN D) 600-400 MG-UNIT TABS, Take by mouth, Disp: , Rfl:     estradiol (ESTRACE) 0 1 mg/g vaginal cream, Insert 0 5 g into the vagina 2 (two) times a week Use every night for 2 weeks and then twice a week , Disp: 42 5 g, Rfl: 1    fluticasone (FLONASE) 50 mcg/act nasal spray, 1 spray into each nostril as needed for allergies, Disp: 1 Bottle, Rfl: 2    ibuprofen (MOTRIN) 200 mg tablet, Take 200 mg by mouth, Disp: , Rfl:     Lutein 20 MG CAPS, Take by mouth, Disp: , Rfl:     Polyethyl Glycol-Propyl Glycol (SYSTANE OP), Apply to eye, Disp: , Rfl:     pravastatin (PRAVACHOL) 10 mg tablet, Take 1 tablet (10 mg total) by mouth 3 (three) times a week, Disp: 36 tablet, Rfl: 1    Cholecalciferol 25 MCG (1000 UT) tablet, Take 1,000 Units by mouth daily (Patient not taking: Reported on 3/21/2022 ), Disp: , Rfl:     lisinopril (ZESTRIL) 2 5 mg tablet, Take 1 tablet (2 5 mg total) by mouth daily, Disp: 30 tablet, Rfl: 5      PHYSICAL EXAMINATION   Physical Exam  Vitals and nursing note reviewed  Constitutional:       General: She is not in acute distress  Appearance: Normal appearance  She is well-developed, well-groomed and normal weight  She is not ill-appearing  HENT:      Head: Normocephalic and atraumatic  Right Ear: Tympanic membrane, ear canal and external ear normal       Left Ear: Tympanic membrane, ear canal and external ear normal       Ears:      Comments: B/L hearing aides     Nose: Nose normal       Mouth/Throat:      Pharynx: Oropharynx is clear  Neck:      Vascular: No carotid bruit  Cardiovascular:      Rate and Rhythm: Normal rate and regular rhythm  Heart sounds: Normal heart sounds  Pulmonary:      Effort: Pulmonary effort is normal  No respiratory distress  Breath sounds: Normal breath sounds and air entry  Musculoskeletal:      Right lower leg: No edema  Left lower leg: No edema  Lymphadenopathy:      Cervical: No cervical adenopathy  Skin:     General: Skin is warm and dry  Neurological:      General: No focal deficit present  Mental Status: She is alert and oriented to person, place, and time  Psychiatric:         Attention and Perception: Attention normal          Mood and Affect: Mood normal          Speech: Speech normal          Behavior: Behavior normal          Thought Content:  Thought content normal          Cognition and Memory: Cognition normal          Judgment: Judgment normal

## 2022-04-12 ENCOUNTER — TELEPHONE (OUTPATIENT)
Dept: FAMILY MEDICINE CLINIC | Facility: CLINIC | Age: 77
End: 2022-04-12

## 2022-04-12 NOTE — TELEPHONE ENCOUNTER
Pt called and LMOM informing PCP of her current symptoms  Pt c/o low grade fever, and a slight cough  Pt stated she took a home covid test and it was negative  Pt is due for eye surgery next Tuesday and is not sure what else she can do and wanted to get PCP's opinion

## 2022-04-13 ENCOUNTER — OFFICE VISIT (OUTPATIENT)
Dept: FAMILY MEDICINE CLINIC | Facility: CLINIC | Age: 77
End: 2022-04-13
Payer: MEDICARE

## 2022-04-13 VITALS
HEART RATE: 105 BPM | BODY MASS INDEX: 21.56 KG/M2 | OXYGEN SATURATION: 93 % | DIASTOLIC BLOOD PRESSURE: 74 MMHG | WEIGHT: 114.2 LBS | HEIGHT: 61 IN | SYSTOLIC BLOOD PRESSURE: 124 MMHG | TEMPERATURE: 100.1 F

## 2022-04-13 DIAGNOSIS — J01.90 ACUTE SINUSITIS, RECURRENCE NOT SPECIFIED, UNSPECIFIED LOCATION: Primary | ICD-10-CM

## 2022-04-13 PROBLEM — J06.9 UPPER RESPIRATORY TRACT INFECTION: Status: ACTIVE | Noted: 2022-04-13

## 2022-04-13 PROCEDURE — U0005 INFEC AGEN DETEC AMPLI PROBE: HCPCS | Performed by: NURSE PRACTITIONER

## 2022-04-13 PROCEDURE — U0003 INFECTIOUS AGENT DETECTION BY NUCLEIC ACID (DNA OR RNA); SEVERE ACUTE RESPIRATORY SYNDROME CORONAVIRUS 2 (SARS-COV-2) (CORONAVIRUS DISEASE [COVID-19]), AMPLIFIED PROBE TECHNIQUE, MAKING USE OF HIGH THROUGHPUT TECHNOLOGIES AS DESCRIBED BY CMS-2020-01-R: HCPCS | Performed by: NURSE PRACTITIONER

## 2022-04-13 PROCEDURE — 99213 OFFICE O/P EST LOW 20 MIN: CPT | Performed by: NURSE PRACTITIONER

## 2022-04-13 RX ORDER — AMOXICILLIN AND CLAVULANATE POTASSIUM 875; 125 MG/1; MG/1
1 TABLET, FILM COATED ORAL EVERY 12 HOURS SCHEDULED
Qty: 14 TABLET | Refills: 0 | Status: SHIPPED | OUTPATIENT
Start: 2022-04-13 | End: 2022-04-20

## 2022-04-13 NOTE — ASSESSMENT & PLAN NOTE
Symptoms reviewed  Consistent with Sinusitis  Pt has Cataract surgery scheduled for 4/18  Will treat today to ensure symptoms resolution prior to procedure  Rx sent for Augmentin  Dosing reviewed  May continue Delsym, ibuprofen PRN  Maintain good hydration  Daily probiotic  Home Covid (-), but will check PCR today as she is scheduled for sx as above      F/U with further concerns or changes

## 2022-04-13 NOTE — PROGRESS NOTES
[facility]    ASSESSMENT AND PLAN     1  Acute sinusitis, recurrence not specified, unspecified location  Assessment & Plan:  Symptoms reviewed  Consistent with Sinusitis  Pt has Cataract surgery scheduled for 4/18  Will treat today to ensure symptoms resolution prior to procedure  Rx sent for Augmentin  Dosing reviewed  May continue Delsym, ibuprofen PRN  Maintain good hydration  Daily probiotic  Home Covid (-), but will check PCR today as she is scheduled for sx as above  F/U with further concerns or changes      Orders:  -     amoxicillin-clavulanate (AUGMENTIN) 875-125 mg per tablet; Take 1 tablet by mouth every 12 (twelve) hours for 7 days  -     COVID Only - Office Collect         SUBJECTIVE       Patient ID: Alexis Craven is a 67 y o  female  Chief Complaint   Patient presents with    URI     Started Monday, cough, fever, runny nose, left lymph node swollen and sore       HISTORY OF PRESENT ILLNESS    URI symptoms  Started Monday: Sinus congestion, runny nose, PND, cough, slight temp (t max 1001), Swollen, sore lymph cervical node  Home Covid (-) yesterday  OTC Delsym and ibuprofen  Has cataract sx scheduled for 4/18  Concern for symptoms worsening and prevneting sx        The following portions of the patient's history were reviewed and updated as appropriate: allergies, current medications, past family history, past medical history, past social history, past surgical history, and problem list     REVIEW OF SYSTEMS  Review of Systems   Constitutional: Positive for fatigue and fever  Negative for activity change and appetite change  HENT: Positive for congestion, postnasal drip, rhinorrhea and sinus pressure  Negative for ear pain  Respiratory: Positive for cough  Negative for chest tightness  Cardiovascular: Negative  Gastrointestinal: Negative  Genitourinary: Negative  Neurological: Negative  Psychiatric/Behavioral: Negative          OBJECTIVE      VITAL SIGNS  /74 (BP Location: Right arm, Patient Position: Sitting, Cuff Size: Adult)   Pulse 105   Temp 100 1 °F (37 8 °C)   Ht 5' 1" (1 549 m)   Wt 51 8 kg (114 lb 3 2 oz)   SpO2 93%   BMI 21 58 kg/m²     CURRENT MEDICATIONS    Current Outpatient Medications:     amoxicillin-clavulanate (AUGMENTIN) 875-125 mg per tablet, Take 1 tablet by mouth every 12 (twelve) hours for 7 days, Disp: 14 tablet, Rfl: 0    Calcium Carbonate-Vitamin D3 (CALCIUM 600/VITAMIN D) 600-400 MG-UNIT TABS, Take by mouth, Disp: , Rfl:     Cholecalciferol 25 MCG (1000 UT) tablet, Take 1,000 Units by mouth daily (Patient not taking: Reported on 3/21/2022 ), Disp: , Rfl:     estradiol (ESTRACE) 0 1 mg/g vaginal cream, Insert 0 5 g into the vagina 2 (two) times a week Use every night for 2 weeks and then twice a week , Disp: 42 5 g, Rfl: 1    fluticasone (FLONASE) 50 mcg/act nasal spray, 1 spray into each nostril as needed for allergies, Disp: 1 Bottle, Rfl: 2    ibuprofen (MOTRIN) 200 mg tablet, Take 200 mg by mouth, Disp: , Rfl:     lisinopril (ZESTRIL) 2 5 mg tablet, Take 1 tablet (2 5 mg total) by mouth daily, Disp: 30 tablet, Rfl: 5    Lutein 20 MG CAPS, Take by mouth, Disp: , Rfl:     Polyethyl Glycol-Propyl Glycol (SYSTANE OP), Apply to eye, Disp: , Rfl:     pravastatin (PRAVACHOL) 10 mg tablet, Take 1 tablet (10 mg total) by mouth 3 (three) times a week, Disp: 36 tablet, Rfl: 1      PHYSICAL EXAMINATION   Physical Exam  Vitals and nursing note reviewed  Constitutional:       Appearance: Normal appearance  She is well-developed and well-groomed  HENT:      Head: Normocephalic and atraumatic  Right Ear: Tympanic membrane, ear canal and external ear normal       Left Ear: Tympanic membrane, ear canal and external ear normal       Mouth/Throat:      Pharynx: Oropharynx is clear  Cardiovascular:      Rate and Rhythm: Normal rate and regular rhythm  Heart sounds: Normal heart sounds     Pulmonary:      Effort: Pulmonary effort is normal  No respiratory distress  Breath sounds: Normal breath sounds and air entry  Lymphadenopathy:      Cervical: No cervical adenopathy  Neurological:      Mental Status: She is alert and oriented to person, place, and time  Psychiatric:         Attention and Perception: Attention normal          Mood and Affect: Mood normal          Speech: Speech normal          Behavior: Behavior normal          Thought Content:  Thought content normal          Cognition and Memory: Cognition normal          Judgment: Judgment normal

## 2022-04-14 ENCOUNTER — TELEPHONE (OUTPATIENT)
Dept: FAMILY MEDICINE CLINIC | Facility: CLINIC | Age: 77
End: 2022-04-14

## 2022-04-14 LAB — SARS-COV-2 RNA RESP QL NAA+PROBE: NEGATIVE

## 2022-04-14 NOTE — TELEPHONE ENCOUNTER
Alternate abx sent:  Doxycycline  100 mg q12 x7 days  Advised to stay well hydrated    Follow-up with problems

## 2022-04-21 ENCOUNTER — APPOINTMENT (OUTPATIENT)
Dept: LAB | Facility: CLINIC | Age: 77
End: 2022-04-21
Payer: MEDICARE

## 2022-04-21 DIAGNOSIS — I10 ESSENTIAL HYPERTENSION: ICD-10-CM

## 2022-04-21 DIAGNOSIS — E78.5 HYPERLIPIDEMIA, UNSPECIFIED HYPERLIPIDEMIA TYPE: ICD-10-CM

## 2022-04-21 LAB
ALBUMIN SERPL BCP-MCNC: 3.6 G/DL (ref 3.5–5)
ALP SERPL-CCNC: 66 U/L (ref 46–116)
ALT SERPL W P-5'-P-CCNC: 33 U/L (ref 12–78)
ANION GAP SERPL CALCULATED.3IONS-SCNC: 3 MMOL/L (ref 4–13)
AST SERPL W P-5'-P-CCNC: 25 U/L (ref 5–45)
BILIRUB SERPL-MCNC: 0.97 MG/DL (ref 0.2–1)
BUN SERPL-MCNC: 23 MG/DL (ref 5–25)
CALCIUM SERPL-MCNC: 9.2 MG/DL (ref 8.3–10.1)
CHLORIDE SERPL-SCNC: 108 MMOL/L (ref 100–108)
CHOLEST SERPL-MCNC: 219 MG/DL
CO2 SERPL-SCNC: 29 MMOL/L (ref 21–32)
CREAT SERPL-MCNC: 0.95 MG/DL (ref 0.6–1.3)
GFR SERPL CREATININE-BSD FRML MDRD: 57 ML/MIN/1.73SQ M
GLUCOSE P FAST SERPL-MCNC: 84 MG/DL (ref 65–99)
HDLC SERPL-MCNC: 70 MG/DL
LDLC SERPL CALC-MCNC: 136 MG/DL (ref 0–100)
NONHDLC SERPL-MCNC: 149 MG/DL
POTASSIUM SERPL-SCNC: 3.8 MMOL/L (ref 3.5–5.3)
PROT SERPL-MCNC: 6.9 G/DL (ref 6.4–8.2)
SODIUM SERPL-SCNC: 140 MMOL/L (ref 136–145)
TRIGL SERPL-MCNC: 67 MG/DL

## 2022-04-21 PROCEDURE — 80053 COMPREHEN METABOLIC PANEL: CPT

## 2022-04-21 PROCEDURE — 36415 COLL VENOUS BLD VENIPUNCTURE: CPT

## 2022-04-21 PROCEDURE — 80061 LIPID PANEL: CPT

## 2022-04-25 ENCOUNTER — OFFICE VISIT (OUTPATIENT)
Dept: FAMILY MEDICINE CLINIC | Facility: CLINIC | Age: 77
End: 2022-04-25
Payer: MEDICARE

## 2022-04-25 VITALS
TEMPERATURE: 97.6 F | HEIGHT: 61 IN | DIASTOLIC BLOOD PRESSURE: 62 MMHG | SYSTOLIC BLOOD PRESSURE: 112 MMHG | OXYGEN SATURATION: 99 % | WEIGHT: 116 LBS | HEART RATE: 63 BPM | BODY MASS INDEX: 21.9 KG/M2

## 2022-04-25 DIAGNOSIS — R59.0 ENLARGED LYMPH NODE IN NECK: ICD-10-CM

## 2022-04-25 DIAGNOSIS — E78.5 HYPERLIPIDEMIA, UNSPECIFIED HYPERLIPIDEMIA TYPE: ICD-10-CM

## 2022-04-25 DIAGNOSIS — N18.31 STAGE 3A CHRONIC KIDNEY DISEASE (HCC): ICD-10-CM

## 2022-04-25 DIAGNOSIS — I10 ESSENTIAL HYPERTENSION: Primary | ICD-10-CM

## 2022-04-25 PROCEDURE — 99214 OFFICE O/P EST MOD 30 MIN: CPT | Performed by: NURSE PRACTITIONER

## 2022-04-25 RX ORDER — NEPAFENAC 0.3 %
SUSPENSION, DROPS(FINAL DOSAGE FORM)(ML) OPHTHALMIC (EYE)
COMMUNITY
Start: 2022-04-23

## 2022-04-25 RX ORDER — BROMFENAC SODIUM 0.7 MG/ML
SOLUTION/ DROPS OPHTHALMIC
COMMUNITY
Start: 2022-04-14

## 2022-04-25 RX ORDER — PREDNISOLONE ACETATE 10 MG/ML
SUSPENSION/ DROPS OPHTHALMIC
COMMUNITY
Start: 2022-04-18

## 2022-04-26 NOTE — PROGRESS NOTES
Jonathan WEISS[facility]    ASSESSMENT AND PLAN     1  Essential hypertension  Assessment & Plan:  Appears well controlled  Asymptomatic  Compliant with Lisinopril 2 5  Continue at this time  F/U 6 month for routine check up  Sooner with problems/concerns    Orders:  -     Comprehensive metabolic panel; Future    2  Hyperlipidemia, unspecified hyperlipidemia type  Assessment & Plan:  Reviewed recent lipids  Total: 219    Currently taking Pravastatin 10, 3 times weekly  Admits to poor diet recently and decreased activity  Will work on life style change, but advised to increase pravastatin  Pt agreeable to 4 times weekly  Discussed current ASCVD risk: 20 7  Will recheck lipids 3 month    Orders:  -     Lipid panel; Future    3  Stage 3a chronic kidney disease [facility])  Assessment & Plan:  Lab Results   Component Value Date    EGFR 57 04/21/2022    EGFR 65 10/21/2021    EGFR 59 10/27/2020    CREATININE 0 95 04/21/2022    CREATININE 0 86 10/21/2021    CREATININE 0 94 10/27/2020   Advise to avoid nephrotoxic medications  Will continue to monitor closely  Recheck 3 month    Orders:  -     Comprehensive metabolic panel; Future    4  Enlarged lymph node in neck  Assessment & Plan:  Small, palpable, mobile cervical lymph node - right side  Pt just noted this week  Was recently congested  Possibly reactive  Advised to monitor  Will US if remains enlarged or note change  Check CBC           SUBJECTIVE       Patient ID: Hemanth Sheets is a 67 y o  female  Chief Complaint   Patient presents with    Follow-up       HISTORY OF PRESENT ILLNESS    BP follow up        The following portions of the patient's history were reviewed and updated as appropriate: allergies, current medications, past family history, past medical history, past social history, past surgical history, and problem list     REVIEW OF SYSTEMS  Review of Systems   Constitutional: Negative  HENT: Negative  Respiratory: Negative      Cardiovascular: Negative  Psychiatric/Behavioral: Negative  OBJECTIVE      VITAL SIGNS  /62 (BP Location: Left arm)   Pulse 63   Temp 97 6 °F (36 4 °C) (Tympanic)   Ht 5' 1" (1 549 m)   Wt 52 6 kg (116 lb)   SpO2 99%   BMI 21 92 kg/m²     CURRENT MEDICATIONS    Current Outpatient Medications:     Calcium Carbonate-Vitamin D3 (CALCIUM 600/VITAMIN D) 600-400 MG-UNIT TABS, Take by mouth, Disp: , Rfl:     estradiol (ESTRACE) 0 1 mg/g vaginal cream, Insert 0 5 g into the vagina 2 (two) times a week Use every night for 2 weeks and then twice a week , Disp: 42 5 g, Rfl: 1    fluticasone (FLONASE) 50 mcg/act nasal spray, 1 spray into each nostril as needed for allergies, Disp: 1 Bottle, Rfl: 2    ibuprofen (MOTRIN) 200 mg tablet, Take 200 mg by mouth, Disp: , Rfl:     Ilevro 0 3 % SUSP, , Disp: , Rfl:     lisinopril (ZESTRIL) 2 5 mg tablet, Take 1 tablet (2 5 mg total) by mouth daily, Disp: 30 tablet, Rfl: 5    Lutein 20 MG CAPS, Take by mouth, Disp: , Rfl:     pravastatin (PRAVACHOL) 10 mg tablet, Take 1 tablet (10 mg total) by mouth 3 (three) times a week (Patient taking differently: Take 10 mg by mouth 3 (three) times a week Will be taking for eye surgery ), Disp: 36 tablet, Rfl: 1    prednisoLONE acetate (PRED FORTE) 1 % ophthalmic suspension, Will be taking after surgery , Disp: , Rfl:     Prolensa 0 07 % SOLN, PLEASE SEE ATTACHED FOR DETAILED DIRECTIONS, Disp: , Rfl:     Cholecalciferol 25 MCG (1000 UT) tablet, Take 1,000 Units by mouth daily (Patient not taking: Reported on 3/21/2022 ), Disp: , Rfl:     Polyethyl Glycol-Propyl Glycol (SYSTANE OP), Apply to eye (Patient not taking: Reported on 4/25/2022 ), Disp: , Rfl:       PHYSICAL EXAMINATION   Physical Exam  Vitals and nursing note reviewed  Constitutional:       Appearance: Normal appearance  HENT:      Head: Normocephalic  Cardiovascular:      Rate and Rhythm: Normal rate and regular rhythm     Pulmonary:      Effort: Pulmonary effort is normal  No respiratory distress  Breath sounds: Normal breath sounds and air entry  Lymphadenopathy:      Cervical: Cervical adenopathy present  Right cervical: Superficial cervical adenopathy (small - mobile) present  No deep or posterior cervical adenopathy  Left cervical: No superficial, deep or posterior cervical adenopathy  Skin:     General: Skin is warm and dry  Neurological:      General: No focal deficit present  Mental Status: She is alert and oriented to person, place, and time  Psychiatric:         Attention and Perception: Attention normal          Mood and Affect: Mood normal          Speech: Speech normal          Behavior: Behavior normal          Thought Content:  Thought content normal          Cognition and Memory: Cognition normal          Judgment: Judgment normal

## 2022-04-26 NOTE — ASSESSMENT & PLAN NOTE
Appears well controlled  Asymptomatic  Compliant with Lisinopril 2 5  Continue at this time  F/U 6 month for routine check up   Sooner with problems/concerns

## 2022-04-26 NOTE — ASSESSMENT & PLAN NOTE
Lab Results   Component Value Date    EGFR 57 04/21/2022    EGFR 65 10/21/2021    EGFR 59 10/27/2020    CREATININE 0 95 04/21/2022    CREATININE 0 86 10/21/2021    CREATININE 0 94 10/27/2020   Advise to avoid nephrotoxic medications  Will continue to monitor closely   Recheck 3 month

## 2022-04-26 NOTE — ASSESSMENT & PLAN NOTE
Small, palpable, mobile cervical lymph node - right side  Pt just noted this week  Was recently congested  Possibly reactive  Advised to monitor  Will US if remains enlarged or note change   Check CBC

## 2022-04-26 NOTE — ASSESSMENT & PLAN NOTE
Reviewed recent lipids  Total: 219    Currently taking Pravastatin 10, 3 times weekly  Admits to poor diet recently and decreased activity  Will work on life style change, but advised to increase pravastatin  Pt agreeable to 4 times weekly  Discussed current ASCVD risk: 20 7   Will recheck lipids 3 month

## 2022-04-27 ENCOUNTER — TELEPHONE (OUTPATIENT)
Dept: GYNECOLOGY | Facility: CLINIC | Age: 77
End: 2022-04-27

## 2022-07-19 DIAGNOSIS — J30.2 SEASONAL ALLERGIES: ICD-10-CM

## 2022-07-20 RX ORDER — FLUTICASONE PROPIONATE 50 MCG
SPRAY, SUSPENSION (ML) NASAL
Qty: 48 ML | Refills: 1 | Status: SHIPPED | OUTPATIENT
Start: 2022-07-20

## 2022-08-21 DIAGNOSIS — E78.5 HYPERLIPIDEMIA, UNSPECIFIED HYPERLIPIDEMIA TYPE: ICD-10-CM

## 2022-08-22 RX ORDER — PRAVASTATIN SODIUM 10 MG
TABLET ORAL
Qty: 36 TABLET | Refills: 1 | Status: SHIPPED | OUTPATIENT
Start: 2022-08-22

## 2022-10-11 PROBLEM — J01.90 ACUTE SINUSITIS: Status: RESOLVED | Noted: 2022-04-13 | Resolved: 2022-10-11

## 2022-10-20 ENCOUNTER — APPOINTMENT (OUTPATIENT)
Dept: LAB | Facility: CLINIC | Age: 77
End: 2022-10-20
Payer: MEDICARE

## 2022-10-20 DIAGNOSIS — I10 ESSENTIAL HYPERTENSION: ICD-10-CM

## 2022-10-20 DIAGNOSIS — N18.31 STAGE 3A CHRONIC KIDNEY DISEASE (HCC): ICD-10-CM

## 2022-10-20 DIAGNOSIS — E78.5 HYPERLIPIDEMIA, UNSPECIFIED HYPERLIPIDEMIA TYPE: ICD-10-CM

## 2022-10-20 LAB
ALBUMIN SERPL BCP-MCNC: 3.6 G/DL (ref 3.5–5)
ALP SERPL-CCNC: 59 U/L (ref 46–116)
ALT SERPL W P-5'-P-CCNC: 25 U/L (ref 12–78)
ANION GAP SERPL CALCULATED.3IONS-SCNC: 9 MMOL/L (ref 4–13)
AST SERPL W P-5'-P-CCNC: 19 U/L (ref 5–45)
BILIRUB SERPL-MCNC: 1.58 MG/DL (ref 0.2–1)
BUN SERPL-MCNC: 23 MG/DL (ref 5–25)
CALCIUM SERPL-MCNC: 9.5 MG/DL (ref 8.3–10.1)
CHLORIDE SERPL-SCNC: 107 MMOL/L (ref 96–108)
CHOLEST SERPL-MCNC: 212 MG/DL
CO2 SERPL-SCNC: 24 MMOL/L (ref 21–32)
CREAT SERPL-MCNC: 1 MG/DL (ref 0.6–1.3)
GFR SERPL CREATININE-BSD FRML MDRD: 54 ML/MIN/1.73SQ M
GLUCOSE P FAST SERPL-MCNC: 96 MG/DL (ref 65–99)
HDLC SERPL-MCNC: 80 MG/DL
LDLC SERPL CALC-MCNC: 116 MG/DL (ref 0–100)
NONHDLC SERPL-MCNC: 132 MG/DL
POTASSIUM SERPL-SCNC: 4 MMOL/L (ref 3.5–5.3)
PROT SERPL-MCNC: 7.3 G/DL (ref 6.4–8.4)
SODIUM SERPL-SCNC: 140 MMOL/L (ref 135–147)
TRIGL SERPL-MCNC: 81 MG/DL

## 2022-10-20 PROCEDURE — 36415 COLL VENOUS BLD VENIPUNCTURE: CPT

## 2022-10-20 PROCEDURE — 80053 COMPREHEN METABOLIC PANEL: CPT

## 2022-10-20 PROCEDURE — 80061 LIPID PANEL: CPT

## 2022-10-26 ENCOUNTER — OFFICE VISIT (OUTPATIENT)
Dept: FAMILY MEDICINE CLINIC | Facility: CLINIC | Age: 77
End: 2022-10-26
Payer: MEDICARE

## 2022-10-26 VITALS
HEART RATE: 60 BPM | OXYGEN SATURATION: 98 % | WEIGHT: 114 LBS | BODY MASS INDEX: 21.52 KG/M2 | SYSTOLIC BLOOD PRESSURE: 110 MMHG | HEIGHT: 61 IN | RESPIRATION RATE: 17 BRPM | TEMPERATURE: 98.1 F | DIASTOLIC BLOOD PRESSURE: 78 MMHG

## 2022-10-26 DIAGNOSIS — I10 ESSENTIAL HYPERTENSION: ICD-10-CM

## 2022-10-26 DIAGNOSIS — Z23 NEED FOR SHINGLES VACCINE: ICD-10-CM

## 2022-10-26 DIAGNOSIS — Z23 NEED FOR INFLUENZA VACCINATION: ICD-10-CM

## 2022-10-26 DIAGNOSIS — N18.31 STAGE 3A CHRONIC KIDNEY DISEASE (HCC): ICD-10-CM

## 2022-10-26 DIAGNOSIS — E78.5 HYPERLIPIDEMIA, UNSPECIFIED HYPERLIPIDEMIA TYPE: Primary | ICD-10-CM

## 2022-10-26 DIAGNOSIS — G89.29 CHRONIC PAIN OF RIGHT KNEE: ICD-10-CM

## 2022-10-26 DIAGNOSIS — R17 ELEVATED BILIRUBIN: ICD-10-CM

## 2022-10-26 DIAGNOSIS — Z13.0 SCREENING, ANEMIA, DEFICIENCY, IRON: ICD-10-CM

## 2022-10-26 DIAGNOSIS — Z00.00 MEDICARE ANNUAL WELLNESS VISIT, SUBSEQUENT: ICD-10-CM

## 2022-10-26 DIAGNOSIS — B00.1 HERPES LABIALIS: ICD-10-CM

## 2022-10-26 DIAGNOSIS — M25.561 CHRONIC PAIN OF RIGHT KNEE: ICD-10-CM

## 2022-10-26 PROCEDURE — G0008 ADMIN INFLUENZA VIRUS VAC: HCPCS | Performed by: NURSE PRACTITIONER

## 2022-10-26 PROCEDURE — G0439 PPPS, SUBSEQ VISIT: HCPCS | Performed by: NURSE PRACTITIONER

## 2022-10-26 PROCEDURE — 90662 IIV NO PRSV INCREASED AG IM: CPT | Performed by: NURSE PRACTITIONER

## 2022-10-26 PROCEDURE — 99214 OFFICE O/P EST MOD 30 MIN: CPT | Performed by: NURSE PRACTITIONER

## 2022-10-26 RX ORDER — VALACYCLOVIR HYDROCHLORIDE 1 G/1
2000 TABLET, FILM COATED ORAL 2 TIMES DAILY
Qty: 20 TABLET | Refills: 0 | Status: SHIPPED | OUTPATIENT
Start: 2022-10-26 | End: 2022-10-27

## 2022-10-26 NOTE — ASSESSMENT & PLAN NOTE
Elevated again: 1 58  Has been up to 2 0 in 2013  Otherwise LFTs normal  Hep C (-) 2017  Will check RUQ US for completeness

## 2022-10-26 NOTE — ASSESSMENT & PLAN NOTE
Acute on chronic knee pain - medial aspect  Noted after playing Pickle ball  No pain today  Note hx of tear several years ago  Improvement with steroid injections  Current pain is minimal and intermittent  No instability  Consider arthritic  Conservative treatment reviewed  Will trial OTC medications: Voltaren gel; Lidoderm patches  Will apply compression for exercise: Pickle ball and hiking  Advised to f/u if persistent  Consider imaging  Pt agreeable with plan

## 2022-10-26 NOTE — PROGRESS NOTES
Assessment and Plan:     Problem List Items Addressed This Visit        Digestive    Herpes labialis     Periodic outbreaks  Rx sent for Valacyclovir  Dose and appropriate use reviewed  May continue OTC Abreva  Relevant Medications    valACYclovir (VALTREX) 1,000 mg tablet       Cardiovascular and Mediastinum    Essential hypertension     Well controlled: 110/78  Asymptomatic  Continue low dose Lisinopril 2 5  Monitoring kidney function as above  Recommend checking periodic home pressures         Relevant Orders    Comprehensive metabolic panel       Genitourinary    Stage 3a chronic kidney disease [facility])     Lab Results   Component Value Date    EGFR 54 10/20/2022    EGFR 57 04/21/2022    EGFR 65 10/21/2021    CREATININE 1 00 10/20/2022    CREATININE 0 95 04/21/2022    CREATININE 0 86 10/21/2021   Following   continue to avoid Nephrotoxic agents  Recheck at next 6 month follow up         Relevant Orders    Comprehensive metabolic panel       Other    Hyperlipidemia     Lipids: 212/81/80/116  Currently taking Pravastatin 10 - 3 times weekly  Tolerating well without myalgias  Already eating well balanced/heatlhy diet  Very active - exercising daily  Reviewed slight bump up in ASCVD risk: 22 7  Will increase pravastatin to daily for better control  Recheck 6 month  Relevant Orders    Lipid panel    Medicare annual wellness visit, subsequent - Primary    Chronic pain of right knee     Acute on chronic knee pain - medial aspect  Noted after playing Pickle ball  No pain today  Note hx of tear several years ago  Improvement with steroid injections  Current pain is minimal and intermittent  No instability  Consider arthritic  Conservative treatment reviewed  Will trial OTC medications: Voltaren gel; Lidoderm patches  Will apply compression for exercise: Pickle ball and hiking  Advised to f/u if persistent  Consider imaging  Pt agreeable with plan  Elevated bilirubin     Elevated again: 1 58   Has been up to 2 0 in 2013  Otherwise LFTs normal  Hep C (-) 2017  Will check RUQ US for completeness  Relevant Orders    Comprehensive metabolic panel    US abdomen limited      Other Visit Diagnoses     Need for influenza vaccination        Administered today by MA    Relevant Orders    influenza vaccine, high-dose, PF 0 7 mL (FLUZONE HIGH-DOSE) (Completed)    Screening, anemia, deficiency, iron        Relevant Orders    CBC and differential    Need for shingles vaccine        Over due  To obtain at local pharmacy           Preventive health issues were discussed with patient, and age appropriate screening tests were ordered as noted in patient's After Visit Summary  Personalized health advice and appropriate referrals for health education or preventive services given if needed, as noted in patient's After Visit Summary       History of Present Illness:     Patient presents for a Medicare Wellness Visit    HPI   Patient Care Team:  TAHMINA Cervantes as PCP - General (Family Medicine)  DO Vanda Sanchez DPM (Podiatry)     Review of Systems:     Review of Systems     Problem List:     Patient Active Problem List   Diagnosis   • Essential hypertension   • Hyperlipidemia   • Generalized osteoarthritis   • Sciatica   • Seasonal allergies   • Medicare annual wellness visit, subsequent   • Chondromalacia patellae   • Ankylosis of knee joint   • Achilles tendinitis   • Achilles bursitis   • Upper respiratory tract infection   • Stage 3a chronic kidney disease ([facility] [address] )   • Enlarged lymph node in neck   • Chronic pain of right knee   • Elevated bilirubin   • Herpes labialis      Past Medical and Surgical History:     Past Medical History:   Diagnosis Date   • Arthritis    • Hyperlipidemia    • Hypertension      Past Surgical History:   Procedure Laterality Date   • BLADDER SURGERY      LAST ASSESSED [id number]   • DILATION AND CURETTAGE OF UTERUS      LAST ASSESSED [id number]   • TOOTH EXTRACTION      LAST ASSESSED [id number]      Family History:     Family History   Problem Relation Age of Onset   • Dementia Mother    • Hypertension Mother    • Skin cancer Mother         age unknown   • No Known Problems Father    • No Known Problems Daughter    • No Known Problems Maternal Grandmother    • No Known Problems Maternal Grandfather    • No Known Problems Paternal Grandmother    • No Known Problems Paternal Grandfather    • No Known Problems Daughter    • No Known Problems Paternal Aunt       Social History:     Social History     Socioeconomic History   • Marital status:      Spouse name: None   • Number of children: None   • Years of education: None   • Highest education level: None   Occupational History   • None   Tobacco Use   • Smoking status: Never Smoker   • Smokeless tobacco: Never Used   Vaping Use   • Vaping Use: Never used   Substance and Sexual Activity   • Alcohol use: Yes     Comment: social    • Drug use: No   • Sexual activity: Not Currently     Birth control/protection: Post-menopausal   Other Topics Concern   • None   Social History Narrative   • None     Social Determinants of Health     Financial Resource Strain: Low Risk    • Difficulty of Paying Living Expenses: Not hard at all   Food Insecurity: Not on file   Transportation Needs: No Transportation Needs   • Lack of Transportation (Medical): No   • Lack of Transportation (Non-Medical): No   Physical Activity: Not on file   Stress: Not on file   Social Connections: Not on file   Intimate Partner Violence: Not on file   Housing Stability: Not on file      Medications and Allergies:     Current Outpatient Medications   Medication Sig Dispense Refill   • Calcium Carbonate-Vitamin D3 600-400 MG-UNIT TABS Take by mouth     • estradiol (ESTRACE) 0 1 mg/g vaginal cream Insert 0 5 g into the vagina 2 (two) times a week Use every night for 2 weeks and then twice a week   42 5 g 1   • fluticasone (FLONASE) 50 mcg/act nasal spray USE 1 SPRAY INTO EACH NOSTRIL AS NEEDED FOR ALLERGIES 48 mL 1   • ibuprofen (MOTRIN) 200 mg tablet Take 200 mg by mouth     • lisinopril (ZESTRIL) 2 5 mg tablet Take 1 tablet (2 5 mg total) by mouth daily 30 tablet 5   • Lutein 20 MG CAPS Take by mouth     • Polyethyl Glycol-Propyl Glycol (SYSTANE OP) Apply to eye     • pravastatin (PRAVACHOL) 10 mg tablet TAKE 1 TABLET BY MOUTH 3 TIMES A WEEK  36 tablet 1   • valACYclovir (VALTREX) 1,000 mg tablet Take 2 tablets (2,000 mg total) by mouth 2 (two) times a day for 1 day 20 tablet 0   • Cholecalciferol 25 MCG (1000 UT) tablet Take 1,000 Units by mouth daily (Patient not taking: No sig reported)       No current facility-administered medications for this visit  Allergies   Allergen Reactions   • Erythromycin Rash      Immunizations:     Immunization History   Administered Date(s) Administered   • COVID-19 PFIZER VACCINE 0 3 ML IM 01/19/2021, 02/09/2021, 10/04/2021   • COVID-19 Pfizer vac (Celso-sucrose, gray cap) 12 yr+ IM 06/06/2022   • INFLUENZA 10/12/2014   • Influenza Split High Dose Preservative Free IM 10/12/2014, 10/13/2017   • Influenza, high dose seasonal 0 7 mL 10/04/2018, 10/15/2019, 10/21/2020, 10/26/2021, 10/26/2022   • Pneumococcal Conjugate 13-Valent 01/21/2018   • Pneumococcal Polysaccharide PPV23 04/12/2010   • Tdap 10/15/2018   • Zoster 04/12/2011   • Zoster Vaccine Recombinant 04/12/2011      Health Maintenance:         Topic Date Due   • Breast Cancer Screening: Mammogram  03/08/2023   • Hepatitis C Screening  Completed     There are no preventive care reminders to display for this patient  Medicare Screening Tests and Risk Assessments:     Luke Patel is here for her Subsequent Wellness visit  Last Medicare Wellness visit information reviewed, patient interviewed, no change since last AWV  Health Risk Assessment:   Patient rates overall health as very good  Patient feels that their physical health rating is same   Patient is very satisfied with their mray Petersen was rated as same  Hearing was rated as same  Patient feels that their emotional and mental health rating is same  Patients states they are never, rarely angry  Patient states they are never, rarely unusually tired/fatigued  Pain experienced in the last 7 days has been none  Patient states that she has experienced no weight loss or gain in last 6 months  Depression Screening:   PHQ-2 Score: 0      Fall Risk Screening: In the past year, patient has experienced: no history of falling in past year      Urinary Incontinence Screening:   Patient has not leaked urine accidently in the last six months  Home Safety:  Patient does not have trouble with stairs inside or outside of their home  Patient has working smoke alarms and has working carbon monoxide detector  Home safety hazards include: none  Nutrition:   Current diet is Regular  Medications:   Patient is currently taking over-the-counter supplements  OTC medications include: see medication list  Patient is able to manage medications  Activities of Daily Living (ADLs)/Instrumental Activities of Daily Living (IADLs):   Walk and transfer into and out of bed and chair?: Yes  Dress and groom yourself?: Yes    Bathe or shower yourself?: Yes    Feed yourself? Yes  Do your laundry/housekeeping?: Yes  Manage your money, pay your bills and track your expenses?: Yes  Make your own meals?: Yes      Previous Hospitalizations:   Any hospitalizations or ED visits within the last 12 months?: Yes    How many hospitalizations have you had in the last year?: 1-2    Advance Care Planning:   Living will: Yes    Durable POA for healthcare:  Yes    Advanced directive: Yes      PREVENTIVE SCREENINGS      Cardiovascular Screening:    General: History Lipid Disorder and Screening Current      Diabetes Screening:     General: Screening Current      Colorectal Cancer Screening:     General: Screening Current      Breast Cancer Screening:     General: Screening Current      Cervical Cancer Screening:    General: Screening Not Indicated      Osteoporosis Screening:    General: Screening Current      Abdominal Aortic Aneurysm (AAA) Screening:        General: Screening Not Indicated      Lung Cancer Screening:     General: Screening Not Indicated      Hepatitis C Screening:    General: Screening Current    Screening, Brief Intervention, and Referral to Treatment (SBIRT)    Screening  Typical number of drinks in a day: 0  Typical number of drinks in a week: 0  Interpretation: Low risk drinking behavior  Other Counseling Topics:   Car/seat belt/driving safety, skin self-exam, sunscreen and regular weightbearing exercise and calcium and vitamin D intake            Physical Exam:     /78   Pulse 60   Temp 98 1 °F (36 7 °C) (Tympanic)   Resp 17   Ht 5' 1" (1 549 m)   Wt 51 7 kg (114 lb)   SpO2 98%   BMI 21 54 kg/m²          TAHMINA Hawkins

## 2022-10-26 NOTE — ASSESSMENT & PLAN NOTE
Lipids: 212/81/80/116  Currently taking Pravastatin 10 - 3 times weekly  Tolerating well without myalgias  Already eating well balanced/heatlhy diet  Very active - exercising daily  Reviewed slight bump up in ASCVD risk: 22 7  Will increase pravastatin to daily for better control  Recheck 6 month

## 2022-10-26 NOTE — ASSESSMENT & PLAN NOTE
Well controlled: 110/78  Asymptomatic  Continue low dose Lisinopril 2 5  Monitoring kidney function as above    Recommend checking periodic home pressures

## 2022-10-26 NOTE — PROGRESS NOTES
[facility]    ASSESSMENT AND PLAN     1  Hyperlipidemia, unspecified hyperlipidemia type  Assessment & Plan:  Lipids: 212/81/80/116  Currently taking Pravastatin 10 - 3 times weekly  Tolerating well without myalgias  Already eating well balanced/heatlhy diet  Very active - exercising daily  Reviewed slight bump up in ASCVD risk: 22 7  Will increase pravastatin to daily for better control  Recheck 6 month  Orders:  -     Lipid panel; Future    2  Chronic pain of right knee  Assessment & Plan:  Acute on chronic knee pain - medial aspect  Noted after playing Pickle ball  No pain today  Note hx of tear several years ago  Improvement with steroid injections  Current pain is minimal and intermittent  No instability  Consider arthritic  Conservative treatment reviewed  Will trial OTC medications: Voltaren gel; Lidoderm patches  Will apply compression for exercise: Pickle ball and hiking  Advised to f/u if persistent  Consider imaging  Pt agreeable with plan  3  Need for influenza vaccination  Comments:  Administered today by MA  Orders:  -     influenza vaccine, high-dose, PF 0 7 mL (FLUZONE HIGH-DOSE)    4  Stage 3a chronic kidney disease [facility])  Assessment & Plan:  Lab Results   Component Value Date    EGFR 54 10/20/2022    EGFR 57 04/21/2022    EGFR 65 10/21/2021    CREATININE 1 00 10/20/2022    CREATININE 0 95 04/21/2022    CREATININE 0 86 10/21/2021   Following   continue to avoid Nephrotoxic agents  Recheck at next 6 month follow up    Orders:  -     Comprehensive metabolic panel; Future    5  Essential hypertension  Assessment & Plan:  Well controlled: 110/78  Asymptomatic  Continue low dose Lisinopril 2 5  Monitoring kidney function as above  Recommend checking periodic home pressures    Orders:  -     Comprehensive metabolic panel; Future    6  Elevated bilirubin  Assessment & Plan:  Elevated again: 1 58  Has been up to 2 0 in 2013  Otherwise LFTs normal  Hep C (-) 2017   Will check RUQ US for completeness  Orders:  -     Comprehensive metabolic panel; Future  -     US abdomen limited; Future; Expected date: 10/26/2022    7  Screening, anemia, deficiency, iron  -     CBC and differential; Future    8  Need for shingles vaccine  Comments:  Over due  To obtain at local pharmacy    9  Herpes labialis  Assessment & Plan:  Periodic outbreaks  Rx sent for Valacyclovir  Dose and appropriate use reviewed  May continue OTC Abreva  Orders:  -     valACYclovir (VALTREX) 1,000 mg tablet; Take 2 tablets (2,000 mg total) by mouth 2 (two) times a day for 1 day    10  Medicare annual wellness visit, subsequent         SUBJECTIVE       Patient ID: Danie Torres is a 65 y o  female  Chief Complaint   Patient presents with   • Follow-up     6 month, BW review    • Medicare Wellness Visit     Subsequent        HISTORY OF PRESENT ILLNESS    justyn check up  Medicare wellness        The following portions of the patient's history were reviewed and updated as appropriate: allergies, current medications, past family history, past medical history, past social history, past surgical history, and problem list     REVIEW OF SYSTEMS  Review of Systems   Constitutional: Negative  HENT: Negative  Eyes: Negative  Respiratory: Negative  Cardiovascular: Negative  Gastrointestinal: Negative  Genitourinary: Negative  Musculoskeletal: Positive for arthralgias (knee pain)  Negative for back pain, gait problem, joint swelling and myalgias  Skin: Negative  Neurological: Negative  Psychiatric/Behavioral: Negative          OBJECTIVE      VITAL SIGNS  /78   Pulse 60   Temp 98 1 °F (36 7 °C) (Tympanic)   Resp 17   Ht 5' 1" (1 549 m)   Wt 51 7 kg (114 lb)   SpO2 98%   BMI 21 54 kg/m²     CURRENT MEDICATIONS    Current Outpatient Medications:   •  Calcium Carbonate-Vitamin D3 600-400 MG-UNIT TABS, Take by mouth, Disp: , Rfl:   •  estradiol (ESTRACE) 0 1 mg/g vaginal cream, Insert 0 5 g into the vagina 2 (two) times a week Use every night for 2 weeks and then twice a week , Disp: 42 5 g, Rfl: 1  •  fluticasone (FLONASE) 50 mcg/act nasal spray, USE 1 SPRAY INTO EACH NOSTRIL AS NEEDED FOR ALLERGIES, Disp: 48 mL, Rfl: 1  •  ibuprofen (MOTRIN) 200 mg tablet, Take 200 mg by mouth, Disp: , Rfl:   •  lisinopril (ZESTRIL) 2 5 mg tablet, Take 1 tablet (2 5 mg total) by mouth daily, Disp: 30 tablet, Rfl: 5  •  Lutein 20 MG CAPS, Take by mouth, Disp: , Rfl:   •  Polyethyl Glycol-Propyl Glycol (SYSTANE OP), Apply to eye, Disp: , Rfl:   •  pravastatin (PRAVACHOL) 10 mg tablet, TAKE 1 TABLET BY MOUTH 3 TIMES A WEEK , Disp: 36 tablet, Rfl: 1  •  valACYclovir (VALTREX) 1,000 mg tablet, Take 2 tablets (2,000 mg total) by mouth 2 (two) times a day for 1 day, Disp: 20 tablet, Rfl: 0  •  Cholecalciferol 25 MCG (1000 UT) tablet, Take 1,000 Units by mouth daily (Patient not taking: No sig reported), Disp: , Rfl:       PHYSICAL EXAMINATION   Physical Exam  Vitals and nursing note reviewed  Constitutional:       General: She is not in acute distress  Appearance: Normal appearance  She is well-developed and well-groomed  She is not ill-appearing  HENT:      Head: Normocephalic and atraumatic  Right Ear: Tympanic membrane, ear canal and external ear normal       Left Ear: Tympanic membrane, ear canal and external ear normal       Mouth/Throat:      Mouth: Mucous membranes are moist    Eyes:      Pupils: Pupils are equal, round, and reactive to light  Neck:      Vascular: No carotid bruit  Cardiovascular:      Rate and Rhythm: Normal rate and regular rhythm  Pulses:           Posterior tibial pulses are 2+ on the right side and 2+ on the left side  Heart sounds: Normal heart sounds  Pulmonary:      Effort: Pulmonary effort is normal  No respiratory distress  Breath sounds: Normal breath sounds and air entry     Musculoskeletal:      Right knee: Normal       Left knee: Normal       Right lower leg: No edema  Left lower leg: No edema  Lymphadenopathy:      Cervical: No cervical adenopathy  Skin:     General: Skin is warm and dry  Neurological:      General: No focal deficit present  Mental Status: She is alert and oriented to person, place, and time  Psychiatric:         Attention and Perception: Attention normal          Mood and Affect: Mood normal          Speech: Speech normal          Behavior: Behavior normal          Thought Content:  Thought content normal          Cognition and Memory: Cognition normal          Judgment: Judgment normal

## 2022-10-26 NOTE — ASSESSMENT & PLAN NOTE
Periodic outbreaks  Rx sent for Valacyclovir  Dose and appropriate use reviewed  May continue OTC Abreva

## 2022-10-26 NOTE — ASSESSMENT & PLAN NOTE
Lab Results   Component Value Date    EGFR 54 10/20/2022    EGFR 57 04/21/2022    EGFR 65 10/21/2021    CREATININE 1 00 10/20/2022    CREATININE 0 95 04/21/2022    CREATININE 0 86 10/21/2021   Following   continue to avoid Nephrotoxic agents   Recheck at next 6 month follow up

## 2022-11-14 ENCOUNTER — HOSPITAL ENCOUNTER (OUTPATIENT)
Dept: ULTRASOUND IMAGING | Facility: MEDICAL CENTER | Age: 77
Discharge: HOME | End: 2022-11-14

## 2022-11-14 DIAGNOSIS — R17 ELEVATED BILIRUBIN: ICD-10-CM

## 2023-01-05 DIAGNOSIS — E78.5 HYPERLIPIDEMIA, UNSPECIFIED HYPERLIPIDEMIA TYPE: ICD-10-CM

## 2023-01-05 RX ORDER — PRAVASTATIN SODIUM 10 MG
TABLET ORAL
Qty: 36 TABLET | Refills: 1 | Status: SHIPPED | OUTPATIENT
Start: 2023-01-05

## 2023-01-06 ENCOUNTER — EVALUATION (OUTPATIENT)
Dept: PHYSICAL THERAPY | Facility: MEDICAL CENTER | Age: 78
End: 2023-01-06

## 2023-01-06 DIAGNOSIS — M25.511 ACUTE PAIN OF RIGHT SHOULDER: Primary | ICD-10-CM

## 2023-01-06 NOTE — PROGRESS NOTES
PT Evaluation     Today's date: 2023  Patient name: Bhavana Gillette  :   MRN: [id number]  Referring provider: Damon Smith DO  Dx:   Encounter Diagnosis     ICD-10-CM    1  Acute pain of right shoulder  M25 511                      Assessment  Assessment details: Bhavana Gillette is a 65 y o  female presents with R shoulder pain  Bhavana Gillette has the below listed impairments and will benefit from skilled PT to improve deficits to return to prior level of function  Impairments: abnormal muscle firing, abnormal or restricted ROM, impaired physical strength and pain with function  Understanding of Dx/Px/POC: good   Prognosis: good    Goals  Impairment Goals  - Decrease pain to 0-3/10  - Improve ROM equal to contralateral side  - Increase strength equal to contralateral side    Functional Goals  - Patient will be independent with comprehensive HEP  - Patient will be able to reach behind her back  - Patient will be able to reach for object overhead  - Patient will be able to lift/carry objects without provocation of symptoms  - Patient will be able to participate in recreational activities      Plan  Patient would benefit from: skilled PT  Referral necessary: No  Planned therapy interventions: home exercise program, manual therapy, neuromuscular re-education, patient education, functional ROM exercises, strengthening, stretching and joint mobilization  Frequency: 1x week  Duration in weeks: 8  Treatment plan discussed with: patient        Subjective Evaluation    History of Present Illness  Mechanism of injury: Augusta Gray reports R shoulder pain started about 3 weeks ago  She is an avid pickleball player and reports she had been having intermittent pain in her shoulder for a few weeks  But about 3 weeks ago she had more severe pain and it persisted  She has since stopped playing and it has started to improve  She also participates in water aerobics, walking, and hiking  She is RHD   Denies previous shoulder injury, R UE paresthesias and/or radiating pain, or weakness             Pain  Current pain ratin  At best pain ratin    Patient Goals  Patient goals for therapy: decreased pain, increased strength and return to sport/leisure activities          Objective     Active Range of Motion   Left Shoulder   Flexion: 180 degrees   External rotation BTH: C7   Internal rotation BTB: upper thoracic     Right Shoulder   Flexion: 160 degrees   External rotation BTH: C3   Internal rotation BTB: lumbar     Passive Range of Motion   Left Shoulder   Flexion: 180 degrees   Abduction: 180 degrees   External rotation 90°: 90 degrees   Internal rotation 90°: 60 degrees     Right Shoulder   Flexion: 160 degrees   Abduction: 155 degrees   External rotation 90°: 80 degrees   Internal rotation 90°: 50 degrees     Strength/Myotome Testing     Left Shoulder     Planes of Motion   Flexion: 5   Extension: 5   Abduction: 5   External rotation at 0°: 5   External rotation at 90°: 5   Internal rotation at 0°: 5   Internal rotation at 90°: 5     Right Shoulder     Planes of Motion   Flexion: 3+   Extension: 4-   External rotation at 0°: 4   External rotation at 90°: 3+   Internal rotation at 0°: 5   Internal rotation at 90°: 4-     Isolated Muscles   Lower trapezius: 3   Middle trapezius: 3   Rhomboids: 3              Precautions: none      Manuals             R GH mobs                                                    Neuro Re-Ed                                                                                                        Ther Ex             scap squeeze             Table slides flex             Supine shoulder flexion             Supine ABC's             S/L ER             Prone retraction             Prone ext             scap 4             Ther Activity                                       Gait Training                                       Modalities

## 2023-01-13 ENCOUNTER — OFFICE VISIT (OUTPATIENT)
Dept: PHYSICAL THERAPY | Facility: MEDICAL CENTER | Age: 78
End: 2023-01-13

## 2023-01-13 ENCOUNTER — TELEPHONE (OUTPATIENT)
Dept: FAMILY MEDICINE CLINIC | Facility: CLINIC | Age: 78
End: 2023-01-13

## 2023-01-13 DIAGNOSIS — M25.511 ACUTE PAIN OF RIGHT SHOULDER: Primary | ICD-10-CM

## 2023-01-13 NOTE — TELEPHONE ENCOUNTER
Pt called stating that she sent a message to GENEVIEEV Yao and she is checking on this  "On my last visit it was suggested to increase my statin to [facility]  Consequently, I have run out  Script is still 3xwk  & pharmacy is unable to refill until Jan 27th  Please submit a revised script    Thank You"

## 2023-01-13 NOTE — PROGRESS NOTES
Daily Note     Today's date: 2023  Patient name: Kar Estrella  :   MRN: [id number]  Referring provider: Jesus Ugalde DO  Dx:   Encounter Diagnosis     ICD-10-CM    1  Acute pain of right shoulder  M25 511                      Subjective: Chayito Recio reports her shoulder feels pretty good during the day with daily activities      Objective: See treatment diary below      Assessment: Tolerated treatment well  Patient exhibited good technique with therapeutic exercises      Plan: Progress treatment as tolerated         Precautions: none      Manuals            R GH mobs  JE inf Gr II-III                                                  Neuro Re-Ed                                                                                                        Ther Ex             scap squeeze             Table slides flex             Supine shoulder flexion  20           Supine ABC's  1           S/L ER  2x10           Prone rows  2x10           Prone ext  2x10           Core rows  2x10           Core ext  2x10                                     Ther Activity                                       Gait Training                                       Modalities

## 2023-01-16 DIAGNOSIS — E78.5 HYPERLIPIDEMIA, UNSPECIFIED HYPERLIPIDEMIA TYPE: ICD-10-CM

## 2023-01-16 RX ORDER — PRAVASTATIN SODIUM 10 MG
TABLET ORAL
Qty: 60 TABLET | Refills: 5 | Status: SHIPPED | OUTPATIENT
Start: 2023-01-16

## 2023-01-20 ENCOUNTER — OFFICE VISIT (OUTPATIENT)
Dept: PHYSICAL THERAPY | Facility: MEDICAL CENTER | Age: 78
End: 2023-01-20

## 2023-01-20 DIAGNOSIS — M25.511 ACUTE PAIN OF RIGHT SHOULDER: Primary | ICD-10-CM

## 2023-01-20 NOTE — PROGRESS NOTES
Daily Note     Today's date: 2023  Patient name: Denisha Mukherjee  :   MRN: [id number]  Referring provider: Ashleigh Mitchell DO  Dx:   Encounter Diagnosis     ICD-10-CM    1  Acute pain of right shoulder  M25 511                      Subjective: Jordyn Bingham reports she feels pain at night when she is sleeping  Objective: See treatment diary below      Assessment: Tolerated treatment well  Patient would benefit from continued PT  VC's to correct form with SL ER      Plan: Progress treatment as tolerated         Precautions: none      Manuals           R GH mobs  JE inf Gr II-III JE inf & post Gr III          scap mobs   Gr II                                    Neuro Re-Ed                                                                                                        Ther Ex             scap squeeze             Table slides flex             Supine shoulder flexion  20 20          Supine ABC's  1 1          S/L ER  2x10 2x10          Prone rows  2x10 HEP          Prone ext  2x10 HEP          Core rows  2x10 2x10          Core ext  2x10 2x10          lawnmower   x15                       Ther Activity                                       Gait Training                                       Modalities

## 2023-01-27 ENCOUNTER — OFFICE VISIT (OUTPATIENT)
Dept: PHYSICAL THERAPY | Facility: MEDICAL CENTER | Age: 78
End: 2023-01-27

## 2023-01-27 DIAGNOSIS — M25.511 ACUTE PAIN OF RIGHT SHOULDER: Primary | ICD-10-CM

## 2023-01-27 NOTE — PROGRESS NOTES
Daily Note     Today's date: 2023  Patient name: Emmy Neves  :   MRN: [id number]  Referring provider: Kathy Stewart DO  Dx:   Encounter Diagnosis     ICD-10-CM    1  Acute pain of right shoulder  M25 511                      Subjective: Apurva Santiago reports she was doing water aerobics this week and had some discomfort with certain exercises with the dumbbell      Objective: See treatment diary below      Assessment: Tolerated treatment well  Patient exhibited good technique with therapeutic exercises      Plan: Progress treatment as tolerated         Precautions: none      Manuals          R GH mobs  JE inf Gr II-III JE inf & post Gr III JE inf & post Gr III         scap mobs   Gr II                                    Neuro Re-Ed                                                                                                        Ther Ex             scap squeeze             Table slides flex             Supine shoulder flexion  20 20          Supine ABC's  1 1          S/L ER  2x10 2x10          Prone rows  2x10 HEP 2x10 1#         Prone ext  2x10 HEP 2x10 1#         Core rows  2x10 2x10 2x10 R         Core ext  2x10 2x10 2x10 R         lawnmower   x15          UBE    3' rev         pulleys    2'         Ther Activity                                       Gait Training                                       Modalities

## 2023-02-03 ENCOUNTER — OFFICE VISIT (OUTPATIENT)
Dept: PHYSICAL THERAPY | Facility: MEDICAL CENTER | Age: 78
End: 2023-02-03

## 2023-02-03 DIAGNOSIS — M25.511 ACUTE PAIN OF RIGHT SHOULDER: Primary | ICD-10-CM

## 2023-02-03 NOTE — PROGRESS NOTES
Daily Note     Today's date: 2/3/2023  Patient name: Emmy Neves  : [id number]  MRN: [id number]  Referring provider: Kathy Stewart DO  Dx:   Encounter Diagnosis     ICD-10-CM    1  Acute pain of right shoulder  M25 511                      Subjective: Apurva Santiago reports her shoulder has been feeling a little better and less pain  She instead has been feeling a little more fatigue  Objective: See treatment diary below      Assessment: Tolerated treatment well  Patient exhibited good technique with therapeutic exercises      Plan: Progress treatment as tolerated         Precautions: none      Manuals 1/6 1/13 1/20 1/27 2/3        R GH mobs  JE inf Gr II-III JE inf & post Gr III JE inf & post Gr III JE inf & post Gr III        scap mobs   Gr II                                    Neuro Re-Ed                                                                                                        Ther Ex             scap squeeze             Table slides flex     HEP        Supine shoulder flexion  20 20  HEP        Supine ABC's  1 1  HEP        S/L ER  2x10 2x10          Prone rows  2x10 HEP 2x10 1# 2x10 1#        Prone ext  2x10 HEP 2x10 1# 2x10 1#        Core rows  2x10 2x10 2x10 R 2x10 R        Core ext  2x10 2x10 2x10 R 2x10 R        lawnmower   x15  2x10 R        UBE    3' rev 4' rev        pulleys    2' 2'        Ther Activity                                       Gait Training                                       Modalities

## 2023-02-13 ENCOUNTER — OFFICE VISIT (OUTPATIENT)
Dept: PHYSICAL THERAPY | Facility: MEDICAL CENTER | Age: 78
End: 2023-02-13

## 2023-02-13 DIAGNOSIS — M25.511 ACUTE PAIN OF RIGHT SHOULDER: Primary | ICD-10-CM

## 2023-02-13 NOTE — PROGRESS NOTES
Daily Note     Today's date: 2023  Patient name: Ankush Li  : [birth year]  MRN: [id number]  Referring provider: Kendra Wiggins DO  Dx:   Encounter Diagnosis     ICD-10-CM    1  Acute pain of right shoulder  M25 511                      Subjective: Kira Johnson reports she has some soreness after exercising      Objective: See treatment diary below      Assessment: Tolerated treatment well  Patient exhibited good technique with therapeutic exercises      Plan: Progress treatment as tolerated         Precautions: none      Manuals 1/6 1/13 1/20 1/27 2/3 2/13       R GH mobs  JE inf Gr II-III JE inf & post Gr III JE inf & post Gr III JE inf & post Gr III JE inf & post Gr III       scap mobs   Gr II                                    Neuro Re-Ed                                                                                                        Ther Ex             scap squeeze             Table slides flex     HEP        Supine shoulder flexion  20 20  HEP        Supine ABC's  1 1  HEP        S/L ER  2x10 2x10          Prone rows  2x10 HEP 2x10 1# 2x10 1# 2x10 1#       Prone ext  2x10 HEP 2x10 1# 2x10 1# 2x10 1#       Core rows  2x10 2x10 2x10 R 2x10 R 2x10 R       Core ext  2x10 2x10 2x10 R 2x10 R 2x10 R       lawnmower   x15  2x10 R 2x10 R       UBE    3' rev 4' rev 4' rev       pulleys    2' 2'        Prone T      15       robbery      15                    Ther Activity                                       Gait Training                                       Modalities

## 2023-02-21 DIAGNOSIS — I10 ESSENTIAL HYPERTENSION: ICD-10-CM

## 2023-02-22 ENCOUNTER — OFFICE VISIT (OUTPATIENT)
Dept: PHYSICAL THERAPY | Facility: MEDICAL CENTER | Age: 78
End: 2023-02-22

## 2023-02-22 DIAGNOSIS — M25.511 ACUTE PAIN OF RIGHT SHOULDER: Primary | ICD-10-CM

## 2023-02-22 RX ORDER — LISINOPRIL 2.5 MG/1
TABLET ORAL
Qty: 90 TABLET | Refills: 1 | Status: SHIPPED | OUTPATIENT
Start: 2023-02-22

## 2023-02-22 NOTE — PROGRESS NOTES
Daily Note     Today's date: 2023  Patient name: Irineo Trevino  :   MRN: [id number]  Referring provider: Howard Santos DO  Dx:   Encounter Diagnosis     ICD-10-CM    1  Acute pain of right shoulder  M25 511                      Subjective: Leandrew Sick reports she did water aerobics yesterday using the dumbbell in the water for the first time  She had some muscle soreness last night be denied sharp pain  She was also able to do some practice swings for pickle ball without pain      Objective: See treatment diary below      Assessment: Tolerated treatment well  Patient exhibited good technique with therapeutic exercises      Plan: Progress treatment as tolerated    She is going to try hitting back and forth for a short period of time tomorrow     Precautions: none      Manuals 1/6 1/13 1/20 1/27 2/3 2/13 2/22      R GH mobs  JE inf Gr II-III JE inf & post Gr III JE inf & post Gr III JE inf & post Gr III JE inf & post Gr III JE inf & post Gr III      scap mobs   Gr II                                    Neuro Re-Ed                                                                                                        Ther Ex             scap squeeze             Table slides flex     HEP        Supine shoulder flexion  20 20  HEP        Supine ABC's  1 1  HEP        S/L ER  2x10 2x10          Prone rows  2x10 HEP 2x10 1# 2x10 1# 2x10 1# 15 1#      Prone ext  2x10 HEP 2x10 1# 2x10 1# 2x10 1# 15 1#      Core rows  2x10 2x10 2x10 R 2x10 R 2x10 R 2x10 R      Core ext  2x10 2x10 2x10 R 2x10 R 2x10 R 2x10 R      lawnmower   x15  2x10 R 2x10 R 2x10 R      UBE    3' rev 4' rev 4' rev 5'      pulleys    2' 2'  3'      Prone T      15 15      robbery      15 20                   Ther Activity                                       Gait Training                                       Modalities

## 2023-03-03 ENCOUNTER — OFFICE VISIT (OUTPATIENT)
Dept: PHYSICAL THERAPY | Facility: MEDICAL CENTER | Age: 78
End: 2023-03-03

## 2023-03-03 DIAGNOSIS — M25.511 ACUTE PAIN OF RIGHT SHOULDER: Primary | ICD-10-CM

## 2023-03-03 NOTE — PROGRESS NOTES
Daily Note     Today's date: 3/3/2023  Patient name: Hillary Andujar  : [id number]  MRN: [id number]  Referring provider: Darshana Burrell DO  Dx:   Encounter Diagnosis     ICD-10-CM    1  Acute pain of right shoulder  M25 511                      Subjective: Debra Childers reports she played pickleball for about 13' and felt good while she was doing it  She had some soreness afterwards  Objective: See treatment diary below      Assessment: Tolerated treatment well   Patient exhibited good technique with therapeutic exercises      Plan: Discussed performing air strokes for pickleball at home about 2x/week      Precautions: none      Manuals 1/6 1/13 1/20 1/27 2/3 2/13 2/22 3/3     R GH mobs  JE inf Gr II-III JE inf & post Gr III JE inf & post Gr III JE inf & post Gr III JE inf & post Gr III JE inf & post Gr III JE inf & post Gr III     scap mobs   Gr II                                    Neuro Re-Ed                                                                                                        Ther Ex             scap squeeze             Table slides flex     HEP        Supine shoulder flexion  20 20  HEP        Supine ABC's  1 1  HEP        S/L ER  2x10 2x10          Prone rows  2x10 HEP 2x10 1# 2x10 1# 2x10 1# 15 1# HEP     Prone ext  2x10 HEP 2x10 1# 2x10 1# 2x10 1# 15 1# HEP     Core rows  2x10 2x10 2x10 R 2x10 R 2x10 R 2x10 R 2x10 R     Core ext  2x10 2x10 2x10 R 2x10 R 2x10 R 2x10 R 2x10 R     lawnmower   x15  2x10 R 2x10 R 2x10 R 2x10 R     UBE    3' rev 4' rev 4' rev 5' 5'     pulleys    2' 2'  3' 4'     Prone T      15 15 2x10     robbery      15 20 2x10 R     Orange ball rolls shoulder flex        10     Ther Activity                                       Gait Training                                       Modalities

## 2023-03-10 ENCOUNTER — OFFICE VISIT (OUTPATIENT)
Dept: PHYSICAL THERAPY | Facility: MEDICAL CENTER | Age: 78
End: 2023-03-10

## 2023-03-10 DIAGNOSIS — M25.511 ACUTE PAIN OF RIGHT SHOULDER: Primary | ICD-10-CM

## 2023-03-10 NOTE — PROGRESS NOTES
Daily Note     Today's date: 3/10/2023  Patient name: Darrel Encarnacion  :   MRN: [id number]  Referring provider: Jamin Young DO  Dx:   Encounter Diagnosis     ICD-10-CM    1  Acute pain of right shoulder  M25 511                      Subjective: oGldie Jaimes reports she did some swings for pickleball this week and it felt good  She had some soreness in the back of her shoulder after water aerobics      Objective: See treatment diary below      Assessment: Tolerated treatment well  Patient exhibited good technique with therapeutic exercises      Plan: Progress treatment as tolerated         Precautions: none      Manuals 1/6 1/13 1/20 1/27 2/3 2/13 2/22 3/3 3/10    R GH mobs  JE inf Gr II-III JE inf & post Gr III JE inf & post Gr III JE inf & post Gr III JE inf & post Gr III JE inf & post Gr III JE inf & post Gr III JE inf  Post Gr III    scap mobs   Gr II                                    Neuro Re-Ed                                                                                                        Ther Ex             scap squeeze             Table slides flex     HEP        Supine shoulder flexion  20 20  HEP        Supine ABC's  1 1  HEP        S/L ER  2x10 2x10          Prone rows  2x10 HEP 2x10 1# 2x10 1# 2x10 1# 15 1# HEP 20 1#    Prone ext  2x10 HEP 2x10 1# 2x10 1# 2x10 1# 15 1# HEP 20 1#    Core rows  2x10 2x10 2x10 R 2x10 R 2x10 R 2x10 R 2x10 R 2x10R    Core ext  2x10 2x10 2x10 R 2x10 R 2x10 R 2x10 R 2x10 R 2x10 R    lawnmower   x15  2x10 R 2x10 R 2x10 R 2x10 R 2x10 R    UBE    3' rev 4' rev 4' rev 5' 5' 5'    pulleys    2' 2'  3' 4'     Prone T      15 15 2x10 2x10    robbery      15 20 2x10 R 2x10 Y    Orange ball rolls shoulder flex        10     Ther Activity                                       Gait Training                                       Modalities

## 2023-03-20 ENCOUNTER — OFFICE VISIT (OUTPATIENT)
Dept: PHYSICAL THERAPY | Facility: MEDICAL CENTER | Age: 78
End: 2023-03-20

## 2023-03-20 DIAGNOSIS — M25.511 ACUTE PAIN OF RIGHT SHOULDER: Primary | ICD-10-CM

## 2023-03-20 NOTE — PROGRESS NOTES
Daily Note     Today's date: 3/20/2023  Patient name: Julián Chin  :   MRN: [id number]  Referring provider: Farrah Forbes DO  Dx:   Encounter Diagnosis     ICD-10-CM    1  Acute pain of right shoulder  M25 511                      Subjective: Anastasiya Ivy reports her shoulder was sore after water aerobics today      Objective: See treatment diary below      Assessment: Tolerated treatment well  Patient exhibited good technique with therapeutic exercises      Plan: Progress treatment as tolerated         Precautions: none      Manuals 1/6 1/13 1/20 1/27 2/3 2/13 2/22 3/3 3/10 3/20   R GH mobs  JE inf Gr II-III JE inf & post Gr III JE inf & post Gr III JE inf & post Gr III JE inf & post Gr III JE inf & post Gr III JE inf & post Gr III JE inf  Post Gr III JE inf post Gr III   scap mobs   Gr II                                    Neuro Re-Ed                                                                                                        Ther Ex             scap squeeze             Table slides flex     HEP        Supine shoulder flexion  20 20  HEP        Supine ABC's  1 1  HEP        S/L ER  2x10 2x10          Prone rows  2x10 HEP 2x10 1# 2x10 1# 2x10 1# 15 1# HEP 20 1# 15 1#   Prone ext  2x10 HEP 2x10 1# 2x10 1# 2x10 1# 15 1# HEP 20 1# 15 1#   Core rows  2x10 2x10 2x10 R 2x10 R 2x10 R 2x10 R 2x10 R 2x10R    Core ext  2x10 2x10 2x10 R 2x10 R 2x10 R 2x10 R 2x10 R 2x10 R    lawnmower   x15  2x10 R 2x10 R 2x10 R 2x10 R 2x10 R    UBE    3' rev 4' rev 4' rev 5' 5' 5'    pulleys    2' 2'  3' 4'     Prone T      15 15 2x10 2x10 15 1#   robbery      15 20 2x10 R 2x10 Y    Orange ball rolls shoulder flex        10     Lax ball          3'   Ther Activity                                       Gait Training                                       Modalities

## 2023-03-27 ENCOUNTER — OFFICE VISIT (OUTPATIENT)
Dept: PHYSICAL THERAPY | Facility: MEDICAL CENTER | Age: 78
End: 2023-03-27

## 2023-03-27 DIAGNOSIS — M25.511 ACUTE PAIN OF RIGHT SHOULDER: Primary | ICD-10-CM

## 2023-03-27 NOTE — PROGRESS NOTES
Daily Note     Today's date: 3/27/2023  Patient name: Norma Yousif  :   MRN: [id number]  Referring provider: Haley Mijares DO  Dx:   Encounter Diagnosis     ICD-10-CM    1  Acute pain of right shoulder  M25 511                      Subjective: Courtney Ramirez reports she went on a 15 mile bike ride this morning      Objective: See treatment diary below      Assessment: Tolerated treatment well  Patient exhibited good technique with therapeutic exercises      Plan: Potential discharge next visit       Precautions: none      Manuals 3/27 1/13 1/20 1/27 2/3 2/13 2/22 3/3 3/10 3/20   R GH mobs JE inf post Gr III JE inf Gr II-III JE inf & post Gr III JE inf & post Gr III JE inf & post Gr III JE inf & post Gr III JE inf & post Gr III JE inf & post Gr III JE inf  Post Gr III JE inf post Gr III   scap mobs   Gr II                                    Neuro Re-Ed                                                                                                                                                            Ther Ex             Stand ABC 1            Stand angels 10            Quadruped ext 10            Quadruped T 10            scap squeeze             Table slides flex     HEP        Supine shoulder flexion  20 20  HEP        Supine ABC's  1 1  HEP        S/L ER  2x10 2x10          Prone rows  2x10 HEP 2x10 1# 2x10 1# 2x10 1# 15 1# HEP 20 1# 15 1#   Prone ext  2x10 HEP 2x10 1# 2x10 1# 2x10 1# 15 1# HEP 20 1# 15 1#   Core rows  2x10 2x10 2x10 R 2x10 R 2x10 R 2x10 R 2x10 R 2x10R    Core ext  2x10 2x10 2x10 R 2x10 R 2x10 R 2x10 R 2x10 R 2x10 R    lawnmower   x15  2x10 R 2x10 R 2x10 R 2x10 R 2x10 R    UBE    3' rev 4' rev 4' rev 5' 5' 5'    pulleys    2' 2'  3' 4'     Prone T      15 15 2x10 2x10 15 1#   robbery      15 20 2x10 R 2x10 Y    Orange ball rolls shoulder flex        10     Lax ball          3'   Ther Activity                                       Gait Training Modalities

## 2023-04-05 ENCOUNTER — OFFICE VISIT (OUTPATIENT)
Dept: PHYSICAL THERAPY | Facility: MEDICAL CENTER | Age: 78
End: 2023-04-05

## 2023-04-05 DIAGNOSIS — M25.511 ACUTE PAIN OF RIGHT SHOULDER: Primary | ICD-10-CM

## 2023-04-05 NOTE — PROGRESS NOTES
Daily Note     Today's date: 2023  Patient name: Jodi Perez  :   MRN: [id number]  Referring provider: Ricardo Walker DO  Dx:   Encounter Diagnosis     ICD-10-CM    1  Acute pain of right shoulder  M25 511                      Subjective: Malathi Good reports overall her shoulder feels a lot better  Objective: See treatment diary below      Assessment: Tolerated treatment well   Patient exhibited good technique with therapeutic exercises      Plan: Malathi Good feels ready to continue with HEP and will call with any questions or concerns     Precautions: none      Manuals 3/27 4/5 1/20 1/27 2/3 2/13 2/22 3/3 3/10 3/20   R GH mobs JE inf post Gr III JE inf Gr II-III JE inf & post Gr III JE inf & post Gr III JE inf & post Gr III JE inf & post Gr III JE inf & post Gr III JE inf & post Gr III JE inf  Post Gr III JE inf post Gr III   scap mobs   Gr II                                    Neuro Re-Ed                                                                                                                                                            Ther Ex             Stand ABC 1            Stand angels 10            Quadruped ext 10 10           Quadruped T 10 10           scap squeeze             Table slides flex     HEP        Supine shoulder flexion   20  HEP        Supine ABC's   1  HEP        S/L ER  2x10 2x10          Prone rows  2x10  HEP 2x10 1# 2x10 1# 2x10 1# 15 1# HEP 20 1# 15 1#   Prone ext  2x10 HEP 2x10 1# 2x10 1# 2x10 1# 15 1# HEP 20 1# 15 1#   Core rows  2x10 R 2x10 2x10 R 2x10 R 2x10 R 2x10 R 2x10 R 2x10R    Core ext  2x 10 R 2x10 2x10 R 2x10 R 2x10 R 2x10 R 2x10 R 2x10 R    lawnmower  2x10 R x15  2x10 R 2x10 R 2x10 R 2x10 R 2x10 R    UBE    3' rev 4' rev 4' rev 5' 5' 5'    pulleys    2' 2'  3' 4'     Prone T      15 15 2x10 2x10 15 1#   robbery      15 20 2x10 R 2x10 Y    Orange ball rolls shoulder flex        10     Lax ball          3'   Ther Activity Gait Training                                       Modalities

## 2023-04-26 ENCOUNTER — OFFICE VISIT (OUTPATIENT)
Dept: FAMILY MEDICINE CLINIC | Facility: CLINIC | Age: 78
End: 2023-04-26

## 2023-04-26 VITALS
DIASTOLIC BLOOD PRESSURE: 80 MMHG | TEMPERATURE: 98 F | BODY MASS INDEX: 21.98 KG/M2 | WEIGHT: 116.4 LBS | HEART RATE: 60 BPM | SYSTOLIC BLOOD PRESSURE: 130 MMHG | HEIGHT: 61 IN | OXYGEN SATURATION: 97 %

## 2023-04-26 DIAGNOSIS — Z13.29 SCREENING FOR THYROID DISORDER: ICD-10-CM

## 2023-04-26 DIAGNOSIS — N18.31 STAGE 3A CHRONIC KIDNEY DISEASE (HCC): ICD-10-CM

## 2023-04-26 DIAGNOSIS — E78.5 HYPERLIPIDEMIA, UNSPECIFIED HYPERLIPIDEMIA TYPE: ICD-10-CM

## 2023-04-26 DIAGNOSIS — Z12.31 ENCOUNTER FOR SCREENING MAMMOGRAM FOR BREAST CANCER: ICD-10-CM

## 2023-04-26 DIAGNOSIS — I10 ESSENTIAL HYPERTENSION: Primary | ICD-10-CM

## 2023-04-26 DIAGNOSIS — R73.01 ELEVATED FASTING BLOOD SUGAR: ICD-10-CM

## 2023-04-26 DIAGNOSIS — M85.80 OSTEOPENIA, UNSPECIFIED LOCATION: ICD-10-CM

## 2023-04-26 DIAGNOSIS — R17 ELEVATED BILIRUBIN: ICD-10-CM

## 2023-04-26 LAB — SL AMB POCT HEMOGLOBIN AIC: 5.2 (ref ?–6.5)

## 2023-04-26 NOTE — ASSESSMENT & PLAN NOTE
Lab Results   Component Value Date    EGFR 60 04/08/2023    EGFR 54 10/20/2022    EGFR 57 04/21/2022    CREATININE 0 91 04/08/2023    CREATININE 1 00 10/20/2022    CREATININE 0 95 04/21/2022     Will continue to monitor: Continue to avoid Nephrotoxic agents   Recheck at next 6 month follow up

## 2023-04-26 NOTE — ASSESSMENT & PLAN NOTE
Fairly well controlled: 130/80 today  Asymptomatic  Reports occasional home pressures: 120s/80s  Advise to check more regularly - goal <130/80  Continue low dose Lisinopril 2 5  Monitoring kidney function as above

## 2023-04-26 NOTE — ASSESSMENT & PLAN NOTE
Lipids improved : 208/68/89/105  Currently taking Pravastatin 10 - 5 times weekly  Tolerating well without myalgias  Already eating well balanced/heatlhy diet  Very active - exercising daily  Reviewed  ASCVD risk: 30 7  Recheck 6 month

## 2023-04-26 NOTE — PROGRESS NOTES
Name: Guillermo Griffin      :       MRN: [id number]  Encounter Provider: TAHMINA Ruvalcaba  Encounter Date: 2023   Encounter department: [address] [place] [address]  Essential hypertension  Assessment & Plan:  Fairly well controlled: 130/80 today  Asymptomatic  Reports occasional home pressures: 120s/80s  Advise to check more regularly - goal <130/80  Continue low dose Lisinopril 2 5  Monitoring kidney function as above  Orders:  -     Comprehensive metabolic panel; Future    2  Encounter for screening mammogram for breast cancer  -     Mammo screening bilateral w 3d & cad; Future; Expected date: 2023    3  Stage 3a chronic kidney disease [facility])  Assessment & Plan:  Lab Results   Component Value Date    EGFR 60 2023    EGFR 54 10/20/2022    EGFR 57 2022    CREATININE 0 91 2023    CREATININE 1 00 10/20/2022    CREATININE 0 95 2022     Will continue to monitor: Continue to avoid Nephrotoxic agents  Recheck at next 6 month follow up    Orders:  -     Comprehensive metabolic panel; Future    4  Hyperlipidemia, unspecified hyperlipidemia type  Assessment & Plan:  Lipids improved : 208/68/89/105  Currently taking Pravastatin 10 - 5 times weekly  Tolerating well without myalgias  Already eating well balanced/heatlhy diet  Very active - exercising daily  Reviewed  ASCVD risk: 30 7  Recheck 6 month  Orders:  -     Lipid panel; Future    5  Elevated bilirubin  Assessment & Plan:  Remains slightly elevated: 1 2  Has been for years - on/off  No abdominal complaints  LFTs normal  Hep C (-)  US unremarkable - with except of small simple cyst  Continue to monitor    Orders:  -     Comprehensive metabolic panel; Future    6  Screening for thyroid disorder  -     TSH, 3rd generation with Free T4 reflex; Future    7  Elevated fasting blood sugar  Comments:  POCT A1c today 5 4  Orders:  -     POCT hemoglobin A1c    8   Osteopenia, unspecified location  Comments:  check Dexa 1/2024  Continue Calcium/Vitamin D supplement    Healthy lifestyle counseling  Pt with good self care habits  Eats well - exercises routinely  Pickle ball, walking, water aerobics    Depression Screening and Follow-up Plan: Patient was screened for depression during today's encounter  They screened negative with a PHQ-2 score of 0  Falls Plan of Care: No fall risk identified    Urinary Incontinence Plan of Care: counseling topics discussed: practice Kegel (pelvic floor strengthening) exercises  No urinary complaints            Subjective      Presents for routien check up  No new concerns today    Review of Systems   Constitutional: Negative  HENT: Negative  Eyes: Negative  Respiratory: Negative  Cardiovascular: Negative  Gastrointestinal: Negative  Genitourinary: Negative  Musculoskeletal: Negative  Skin: Negative  Neurological: Negative  Psychiatric/Behavioral: Negative  Current Outpatient Medications on File Prior to Visit   Medication Sig   • Calcium Carbonate-Vitamin D3 600-400 MG-UNIT TABS Take by mouth   • Cholecalciferol 25 MCG (1000 UT) tablet Take 1,000 Units by mouth daily   • estradiol (ESTRACE) 0 1 mg/g vaginal cream Insert 0 5 g into the vagina 2 (two) times a week Use every night for 2 weeks and then twice a week     • fluticasone (FLONASE) 50 mcg/act nasal spray USE 1 SPRAY INTO EACH NOSTRIL AS NEEDED FOR ALLERGIES   • ibuprofen (MOTRIN) 200 mg tablet Take 200 mg by mouth   • lisinopril (ZESTRIL) 2 5 mg tablet TAKE 1 TABLET BY MOUTH EVERY DAY   • Lutein 20 MG CAPS Take by mouth   • Polyethyl Glycol-Propyl Glycol (SYSTANE OP) Apply to eye   • pravastatin (PRAVACHOL) 10 mg tablet Take 5 days per week   • valACYclovir (VALTREX) 1,000 mg tablet Take 2 tablets (2,000 mg total) by mouth 2 (two) times a day for 1 day       Objective     /80 (BP Location: Right arm, Patient Position: Sitting, Cuff Size: Adult)   Pulse "60   Temp 98 °F (36 7 °C)   Ht 5' 1 25\" (1 556 m)   Wt 52 8 kg (116 lb 6 4 oz)   SpO2 97%   BMI 21 81 kg/m²     Physical Exam  Vitals and nursing note reviewed  Constitutional:       General: She is not in acute distress  Appearance: Normal appearance  She is well-developed and well-groomed  She is not ill-appearing  HENT:      Head: Normocephalic  Right Ear: Tympanic membrane and ear canal normal  Decreased hearing noted  Left Ear: Tympanic membrane and ear canal normal  Decreased hearing noted  Ears:      Comments: [place] at baseline - wears b/l hearing aides     Mouth/Throat:      Mouth: Mucous membranes are moist    Eyes:      Extraocular Movements: Extraocular movements intact  Conjunctiva/sclera: Conjunctivae normal       Pupils: Pupils are equal, round, and reactive to light  Neck:      Thyroid: No thyromegaly  Vascular: No carotid bruit  Cardiovascular:      Rate and Rhythm: Normal rate and regular rhythm  Pulses:           Posterior tibial pulses are 2+ on the right side and 2+ on the left side  Heart sounds: Normal heart sounds  Pulmonary:      Effort: Pulmonary effort is normal  No respiratory distress  Breath sounds: Normal breath sounds and air entry  Musculoskeletal:      Right lower leg: No edema  Left lower leg: No edema  Lymphadenopathy:      Cervical: No cervical adenopathy  Skin:     General: Skin is warm and dry  Neurological:      General: No focal deficit present  Mental Status: She is alert and oriented to person, place, and time  Psychiatric:         Attention and Perception: Attention normal          Mood and Affect: Mood normal          Behavior: Behavior normal          Thought Content:  Thought content normal          Judgment: Judgment normal        Ankita PostTAHMINA zamora  "

## 2023-04-26 NOTE — ASSESSMENT & PLAN NOTE
Remains slightly elevated: 1 2  Has been for years - on/off  No abdominal complaints  LFTs normal  Hep C (-)   US unremarkable - with except of small simple cyst  Continue to monitor

## 2023-07-06 ENCOUNTER — HOSPITAL ENCOUNTER (OUTPATIENT)
Dept: MAMMOGRAPHY | Facility: MEDICAL CENTER | Age: 78
Discharge: HOME | End: 2023-07-06
Payer: MEDICARE

## 2023-07-06 VITALS — BODY MASS INDEX: 21.89 KG/M2 | WEIGHT: 115.96 LBS | HEIGHT: 61 IN

## 2023-07-06 DIAGNOSIS — Z12.31 ENCOUNTER FOR SCREENING MAMMOGRAM FOR BREAST CANCER: ICD-10-CM

## 2023-07-06 PROCEDURE — 77063 BREAST TOMOSYNTHESIS BI: CPT

## 2023-07-06 PROCEDURE — 77067 SCR MAMMO BI INCL CAD: CPT

## 2023-08-22 DIAGNOSIS — I10 ESSENTIAL HYPERTENSION: ICD-10-CM

## 2023-08-22 RX ORDER — LISINOPRIL 2.5 MG/1
TABLET ORAL
Qty: 90 TABLET | Refills: 1 | Status: SHIPPED | OUTPATIENT
Start: 2023-08-22

## 2023-10-20 ENCOUNTER — RA CDI HCC (OUTPATIENT)
Dept: OTHER | Facility: HOSPITAL | Age: 78
End: 2023-10-20

## 2023-10-20 ENCOUNTER — APPOINTMENT (OUTPATIENT)
Dept: LAB | Facility: CLINIC | Age: 78
End: 2023-10-20
Payer: MEDICARE

## 2023-10-20 DIAGNOSIS — Z13.29 SCREENING FOR THYROID DISORDER: ICD-10-CM

## 2023-10-20 DIAGNOSIS — N18.31 STAGE 3A CHRONIC KIDNEY DISEASE (HCC): ICD-10-CM

## 2023-10-20 DIAGNOSIS — R17 ELEVATED BILIRUBIN: ICD-10-CM

## 2023-10-20 DIAGNOSIS — E78.5 HYPERLIPIDEMIA, UNSPECIFIED HYPERLIPIDEMIA TYPE: ICD-10-CM

## 2023-10-20 DIAGNOSIS — I10 ESSENTIAL HYPERTENSION: ICD-10-CM

## 2023-10-20 LAB
ALBUMIN SERPL BCP-MCNC: 4 G/DL (ref 3.5–5)
ALP SERPL-CCNC: 56 U/L (ref 34–104)
ALT SERPL W P-5'-P-CCNC: 20 U/L (ref 7–52)
ANION GAP SERPL CALCULATED.3IONS-SCNC: 8 MMOL/L
AST SERPL W P-5'-P-CCNC: 24 U/L (ref 13–39)
BILIRUB SERPL-MCNC: 1.26 MG/DL (ref 0.2–1)
BUN SERPL-MCNC: 29 MG/DL (ref 5–25)
CALCIUM SERPL-MCNC: 9.4 MG/DL (ref 8.4–10.2)
CHLORIDE SERPL-SCNC: 105 MMOL/L (ref 96–108)
CHOLEST SERPL-MCNC: 212 MG/DL
CO2 SERPL-SCNC: 27 MMOL/L (ref 21–32)
CREAT SERPL-MCNC: 0.85 MG/DL (ref 0.6–1.3)
GFR SERPL CREATININE-BSD FRML MDRD: 65 ML/MIN/1.73SQ M
GLUCOSE P FAST SERPL-MCNC: 76 MG/DL (ref 65–99)
HDLC SERPL-MCNC: 79 MG/DL
LDLC SERPL CALC-MCNC: 119 MG/DL (ref 0–100)
NONHDLC SERPL-MCNC: 133 MG/DL
POTASSIUM SERPL-SCNC: 4.3 MMOL/L (ref 3.5–5.3)
PROT SERPL-MCNC: 6.7 G/DL (ref 6.4–8.4)
SODIUM SERPL-SCNC: 140 MMOL/L (ref 135–147)
TRIGL SERPL-MCNC: 71 MG/DL
TSH SERPL DL<=0.05 MIU/L-ACNC: 1.53 UIU/ML (ref 0.45–4.5)

## 2023-10-20 PROCEDURE — 36415 COLL VENOUS BLD VENIPUNCTURE: CPT

## 2023-10-20 PROCEDURE — 84443 ASSAY THYROID STIM HORMONE: CPT

## 2023-10-20 PROCEDURE — 80053 COMPREHEN METABOLIC PANEL: CPT

## 2023-10-20 PROCEDURE — 80061 LIPID PANEL: CPT

## 2023-10-27 ENCOUNTER — OFFICE VISIT (OUTPATIENT)
Dept: FAMILY MEDICINE CLINIC | Facility: CLINIC | Age: 78
End: 2023-10-27
Payer: MEDICARE

## 2023-10-27 VITALS
TEMPERATURE: 97.6 F | HEART RATE: 64 BPM | SYSTOLIC BLOOD PRESSURE: 120 MMHG | WEIGHT: 116 LBS | OXYGEN SATURATION: 99 % | HEIGHT: 61 IN | DIASTOLIC BLOOD PRESSURE: 76 MMHG | BODY MASS INDEX: 21.9 KG/M2

## 2023-10-27 DIAGNOSIS — E78.5 HYPERLIPIDEMIA, UNSPECIFIED HYPERLIPIDEMIA TYPE: Primary | ICD-10-CM

## 2023-10-27 DIAGNOSIS — Z00.00 MEDICARE ANNUAL WELLNESS VISIT, SUBSEQUENT: ICD-10-CM

## 2023-10-27 DIAGNOSIS — I10 ESSENTIAL HYPERTENSION: ICD-10-CM

## 2023-10-27 DIAGNOSIS — N18.31 STAGE 3A CHRONIC KIDNEY DISEASE (HCC): ICD-10-CM

## 2023-10-27 PROCEDURE — G0439 PPPS, SUBSEQ VISIT: HCPCS | Performed by: NURSE PRACTITIONER

## 2023-10-27 PROCEDURE — 99214 OFFICE O/P EST MOD 30 MIN: CPT | Performed by: NURSE PRACTITIONER

## 2023-10-27 NOTE — PROGRESS NOTES
[facility]    ASSESSMENT AND PLAN     1. Hyperlipidemia, unspecified hyperlipidemia type  Assessment & Plan:  Lipids remain stable: Total 212;   Currently taking Pravastatin 10 - 5 times weekly. Tolerating well without myalgias. Already eating well balanced/heatlhy diet. Very active - exercising daily. Reviewed  ASCVD risk: 29.4-recently decreased a little. Recheck 1 year         2. Medicare annual wellness visit, subsequent    3. Essential hypertension  Assessment & Plan:  Well-controlled today: 120/76  Patient asymptomatic  Advised periodic home blood pressures  Continue low-dose lisinopril 2.5  Continue monitoring kidney function as below      4. Stage 3a chronic kidney disease [facility])  Assessment & Plan:  Lab Results   Component Value Date    EGFR 65 10/20/2023    EGFR 60 04/08/2023    EGFR 54 10/20/2022    CREATININE 0.85 10/20/2023    CREATININE 0.91 04/08/2023    CREATININE 1.00 10/20/2022   Stable  Continue monitoring continue limit/avoidance of nephrotoxic agents             Depression Screening and Follow-up Plan: Patient was screened for depression during today's encounter. They screened negative with a PHQ-2 score of 0. Falls Plan of Care: Denies any recent falls. No fall risk identified. Urinary Incontinence Plan of Care: counseling topics discussed: practice Kegel (pelvic floor strengthening) exercises and preventing constipation. Denies any urinary incontinence. SUBJECTIVE       Patient ID: Brennan Rosa is a 77 y.o. female.     Chief Complaint   Patient presents with    Follow-up       HISTORY OF PRESENT ILLNESS    Routine checkup today and Medicare wellness  Reports doing well          The following portions of the patient's history were reviewed and updated as appropriate: allergies, current medications, past family history, past medical history, past social history, past surgical history, and problem list.    REVIEW OF SYSTEMS  Review of Systems Constitutional: Negative. HENT: Negative. Eyes: Negative. Respiratory: Negative. Cardiovascular: Negative. Gastrointestinal: Negative. Genitourinary: Negative. Musculoskeletal: Negative. Arthralgias: Occasional joint pains. Skin: Negative. Neurological: Negative. Psychiatric/Behavioral: Negative. OBJECTIVE      VITAL SIGNS  /76 (BP Location: Left arm, Patient Position: Sitting, Cuff Size: Adult)   Pulse 64   Temp 97.6 °F (36.4 °C)   Ht 5' 1.25" (1.556 m)   Wt 52.6 kg (116 lb)   SpO2 99%   BMI 21.74 kg/m²     CURRENT MEDICATIONS    Current Outpatient Medications:     Calcium Carbonate-Vitamin D3 600-400 MG-UNIT TABS, Take by mouth, Disp: , Rfl:     Cholecalciferol 25 MCG (1000 UT) tablet, Take 1,000 Units by mouth daily, Disp: , Rfl:     estradiol (ESTRACE) 0.1 mg/g vaginal cream, Insert 0.5 g into the vagina 2 (two) times a week, Disp: 42.5 g, Rfl: 1    ibuprofen (MOTRIN) 200 mg tablet, Take 200 mg by mouth, Disp: , Rfl:     lisinopril (ZESTRIL) 2.5 mg tablet, TAKE 1 TABLET BY MOUTH EVERY DAY, Disp: 90 tablet, Rfl: 1    Lutein 20 MG CAPS, Take by mouth, Disp: , Rfl:     Polyethyl Glycol-Propyl Glycol (SYSTANE OP), Apply to eye, Disp: , Rfl:     pravastatin (PRAVACHOL) 10 mg tablet, Take 5 days per week, Disp: 60 tablet, Rfl: 5      PHYSICAL EXAMINATION   Physical Exam  Vitals and nursing note reviewed. Constitutional:       General: She is not in acute distress. Appearance: Normal appearance. She is well-developed and well-groomed. She is not ill-appearing. HENT:      Head: Normocephalic and atraumatic. Right Ear: Tympanic membrane, ear canal and external ear normal.      Left Ear: Tympanic membrane, ear canal and external ear normal.      Mouth/Throat:      Mouth: Mucous membranes are moist.   Eyes:      Pupils: Pupils are equal, round, and reactive to light. Neck:      Vascular: No carotid bruit.    Cardiovascular:      Rate and Rhythm: Normal rate and regular rhythm. Pulses:           Posterior tibial pulses are 2+ on the right side and 2+ on the left side. Heart sounds: Normal heart sounds. Pulmonary:      Effort: Pulmonary effort is normal. No respiratory distress. Breath sounds: Normal breath sounds and air entry. Musculoskeletal:      Right lower leg: No edema. Left lower leg: No edema. Lymphadenopathy:      Cervical: No cervical adenopathy. Skin:     General: Skin is warm and dry. Neurological:      General: No focal deficit present. Mental Status: She is alert and oriented to person, place, and time. Psychiatric:         Attention and Perception: Attention normal.         Mood and Affect: Mood normal.         Behavior: Behavior normal.         Thought Content:  Thought content normal.         Judgment: Judgment normal.

## 2023-10-27 NOTE — ASSESSMENT & PLAN NOTE
Lipids remain stable: Total 212;   Currently taking Pravastatin 10 - 5 times weekly. Tolerating well without myalgias. Already eating well balanced/heatlhy diet. Very active - exercising daily. Reviewed  ASCVD risk: 29.4-recently decreased a little.   Recheck 1 year

## 2023-10-27 NOTE — PROGRESS NOTES
Assessment and Plan:     Problem List Items Addressed This Visit          Cardiovascular and Mediastinum    Essential hypertension       Genitourinary    Stage 3a chronic kidney disease ([address])       Other    Hyperlipidemia    Medicare annual wellness visit, subsequent - Primary       Depression Screening and Follow-up Plan: Patient was screened for depression during today's encounter. They screened negative with a PHQ-2 score of 0. Preventive health issues were discussed with patient, and age appropriate screening tests were ordered as noted in patient's After Visit Summary. Personalized health advice and appropriate referrals for health education or preventive services given if needed, as noted in patient's After Visit Summary.      History of Present Illness:     Patient presents for a Medicare Wellness Visit    HPI   Patient Care Team:  TAHMINA Acosta as PCP - General (Family Medicine)  DO Conchita Tang DPM (Podiatry)     Review of Systems:     Review of Systems     Problem List:     Patient Active Problem List   Diagnosis    Essential hypertension    Hyperlipidemia    Generalized osteoarthritis    Sciatica    Seasonal allergies    Medicare annual wellness visit, subsequent    Chondromalacia patellae    Ankylosis of knee joint    Achilles tendinitis    Achilles bursitis    Upper respiratory tract infection    Stage 3a chronic kidney disease ([address])    Enlarged lymph node in neck    Chronic pain of right knee    Elevated bilirubin    Herpes labialis      Past Medical and Surgical History:     Past Medical History:   Diagnosis Date    Allergic 35 yrs ago    Arthritis     Chronic kidney disease     HL (hearing loss) wear HA's    Hyperlipidemia     Hypertension      Past Surgical History:   Procedure Laterality Date    BLADDER SURGERY      LAST ASSESSED [id number]    DILATION AND CURETTAGE OF UTERUS      LAST ASSESSED [id number]    EYE SURGERY  cataracts    2022    TOOTH EXTRACTION LAST ASSESSED [id number]      Family History:     Family History   Problem Relation Age of Onset    Dementia Mother     Hypertension Mother     Skin cancer Mother         age unknown    Hearing loss Mother     Vision loss Mother     No Known Problems Father     No Known Problems Daughter     No Known Problems Daughter     No Known Problems Maternal Grandmother     No Known Problems Maternal Grandfather     No Known Problems Paternal Grandmother     No Known Problems Paternal Grandfather     No Known Problems Paternal Aunt       Social History:     Social History     Socioeconomic History    Marital status:      Spouse name: None    Number of children: None    Years of education: None    Highest education level: None   Occupational History    None   Tobacco Use    Smoking status: Never    Smokeless tobacco: Never   Vaping Use    Vaping Use: Never used   Substance and Sexual Activity    Alcohol use: Yes     Alcohol/week: 5.0 standard drinks of alcohol     Types: 3 Glasses of wine, 2 Standard drinks or equivalent per week     Comment: social     Drug use: No    Sexual activity: Yes     Partners: Male     Birth control/protection: Post-menopausal   Other Topics Concern    None   Social History Narrative    None     Social Determinants of Health     Financial Resource Strain: Low Risk  (10/27/2023)    Overall Financial Resource Strain (CARDIA)     Difficulty of Paying Living Expenses: Not hard at all   Food Insecurity: Not on file   Transportation Needs: No Transportation Needs (10/27/2023)    PRAPARE - Transportation     Lack of Transportation (Medical): No     Lack of Transportation (Non-Medical):  No   Physical Activity: Not on file   Stress: Not on file   Social Connections: Not on file   Intimate Partner Violence: Not on file   Housing Stability: Not on file      Medications and Allergies:     Current Outpatient Medications   Medication Sig Dispense Refill    Calcium Carbonate-Vitamin D3 600-400 MG-UNIT TABS Take by mouth      Cholecalciferol 25 MCG (1000 UT) tablet Take 1,000 Units by mouth daily      estradiol (ESTRACE) 0.1 mg/g vaginal cream Insert 0.5 g into the vagina 2 (two) times a week 42.5 g 1    ibuprofen (MOTRIN) 200 mg tablet Take 200 mg by mouth      lisinopril (ZESTRIL) 2.5 mg tablet TAKE 1 TABLET BY MOUTH EVERY DAY 90 tablet 1    Lutein 20 MG CAPS Take by mouth      Polyethyl Glycol-Propyl Glycol (SYSTANE OP) Apply to eye      pravastatin (PRAVACHOL) 10 mg tablet Take 5 days per week 60 tablet 5     No current facility-administered medications for this visit. Allergies   Allergen Reactions    Erythromycin Rash      Immunizations:     Immunization History   Administered Date(s) Administered    COVID-19 PFIZER VACCINE 0.3 ML IM 01/19/2021, 02/09/2021, 10/04/2021    COVID-19 Pfizer Vac BIVALENT Celso-sucrose 12 Yr+ IM 12/05/2022    COVID-19 Pfizer vac (Celso-sucrose, gray cap) 12 yr+ IM 06/06/2022    INFLUENZA 10/12/2014    Influenza Split High Dose Preservative Free IM 10/12/2014, 10/13/2017    Influenza, high dose seasonal 0.7 mL 10/04/2018, 10/15/2019, 10/21/2020, 10/26/2021, 10/26/2022    Pneumococcal Conjugate 13-Valent 01/21/2018    Pneumococcal Polysaccharide PPV23 04/12/2010    Tdap 10/15/2018    Zoster 04/12/2011    Zoster Vaccine Recombinant 04/12/2011, 01/25/2023, 07/03/2023      Health Maintenance:         Topic Date Due    Breast Cancer Screening: Mammogram  07/06/2024    Hepatitis C Screening  Completed         Topic Date Due    COVID-19 Vaccine (6 - Pfizer series) 04/05/2023    Influenza Vaccine (1) 09/01/2023      Medicare Screening Tests and Risk Assessments:     Javy Cordero is here for her Subsequent Wellness visit. Last Medicare Wellness visit information reviewed, patient interviewed and updates made to the record today. Health Risk Assessment:   Patient rates overall health as very good. Patient feels that their physical health rating is same.  Patient is very satisfied with their life. Ilya Ortiz was rated as same. Hearing was rated as same. Patient feels that their emotional and mental health rating is same. Patients states they are sometimes angry. Patient states they are sometimes unusually tired/fatigued. Pain experienced in the last 7 days has been some. Patient's pain rating has been 3/10. Patient states that she has experienced no weight loss or gain in last 6 months. Depression Screening:   PHQ-2 Score: 0      Fall Risk Screening: In the past year, patient has experienced: no history of falling in past year      Urinary Incontinence Screening:   Patient has not leaked urine accidently in the last six months. Home Safety:  Patient does not have trouble with stairs inside or outside of their home. Patient has working smoke alarms and has working carbon monoxide detector. Home safety hazards include: none. Nutrition:   Current diet is Regular. Medications:   Patient is currently taking over-the-counter supplements. OTC medications include: see medication list. Patient is able to manage medications. Activities of Daily Living (ADLs)/Instrumental Activities of Daily Living (IADLs):   Walk and transfer into and out of bed and chair?: Yes  Dress and groom yourself?: Yes    Bathe or shower yourself?: Yes    Feed yourself? Yes  Do your laundry/housekeeping?: Yes  Manage your money, pay your bills and track your expenses?: Yes  Make your own meals?: Yes    Do your own shopping?: Yes    Previous Hospitalizations:   Any hospitalizations or ED visits within the last 12 months?: No      Advance Care Planning:   Living will: Yes    Durable POA for healthcare:  Yes    Advanced directive: Yes      Cognitive Screening:   Provider or family/friend/caregiver concerned regarding cognition?: No    PREVENTIVE SCREENINGS      Cardiovascular Screening:    General: Screening Not Indicated and History Lipid Disorder      Diabetes Screening:     General: Screening Current      Colorectal Cancer Screening:     General: Screening Current      Breast Cancer Screening:     General: Screening Current      Cervical Cancer Screening:    General: Screening Not Indicated      Osteoporosis Screening:    General: Screening Current      Abdominal Aortic Aneurysm (AAA) Screening:        General: Screening Not Indicated      Lung Cancer Screening:     General: Screening Not Indicated      Hepatitis C Screening:    General: Screening Current      Preventive Screening Comments: Note patient did have a colonoscopy through [facility] in 2019. We will have record update    Screening, Brief Intervention, and Referral to Treatment (SBIRT)    Screening  Typical number of drinks in a day: 0  Typical number of drinks in a week: 0  Interpretation: Low risk drinking behavior. Single Item Drug Screening:  How often have you used an illegal drug (including marijuana) or a prescription medication for non-medical reasons in the past year? never    Single Item Drug Screen Score: 0  Interpretation: Negative screen for possible drug use disorder    No results found.      Physical Exam:     /76 (BP Location: Left arm, Patient Position: Sitting, Cuff Size: Adult)   Pulse 64   Temp 97.6 °F (36.4 °C)   Ht 5' 1.25" (1.556 m)   Wt 52.6 kg (116 lb)   SpO2 99%   BMI 21.74 kg/m²     Physical Exam     TAHMINA Srivastava

## 2023-10-27 NOTE — PATIENT INSTRUCTIONS
Medicare Preventive Visit Patient Instructions  Thank you for completing your Welcome to Medicare Visit or Medicare Annual Wellness Visit today. Your next wellness visit will be due in one year (10/27/2024). The screening/preventive services that you may require over the next 5-10 years are detailed below. Some tests may not apply to you based off risk factors and/or age. Screening tests ordered at today's visit but not completed yet may show as past due. Also, please note that scanned in results may not display below. Preventive Screenings:  Service Recommendations Previous Testing/Comments   Colorectal Cancer Screening  * Colonoscopy    * Fecal Occult Blood Test (FOBT)/Fecal Immunochemical Test (FIT)  * Fecal DNA/Cologuard Test  * Flexible Sigmoidoscopy Age: 43-73 years old   Colonoscopy: every 10 years (may be performed more frequently if at higher risk)  OR  FOBT/FIT: every 1 year  OR  Cologuard: every 3 years  OR  Sigmoidoscopy: every 5 years  Screening may be recommended earlier than age 39 if at higher risk for colorectal cancer. Also, an individualized decision between you and your healthcare provider will decide whether screening between the ages of 77-80 would be appropriate. Colonoscopy: 01/26/2012  FOBT/FIT: Not on file  Cologuard: Not on file  Sigmoidoscopy: Not on file          Breast Cancer Screening Age: 36 years old  Frequency: every 1-2 years  Not required if history of left and right mastectomy Mammogram: 07/06/2023    Screening Current   Cervical Cancer Screening Between the ages of 21-29, pap smear recommended once every 3 years. Between the ages of 32-69, can perform pap smear with HPV co-testing every 5 years.    Recommendations may differ for women with a history of total hysterectomy, cervical cancer, or abnormal pap smears in past. Pap Smear: 11/15/2021    Screening Not Indicated   Hepatitis C Screening Once for adults born between 1945 and 1965  More frequently in patients at high risk for Hepatitis C Hep C Antibody: 10/19/2017    Screening Current   Diabetes Screening 1-2 times per year if you're at risk for diabetes or have pre-diabetes Fasting glucose: 76 mg/dL (10/20/2023)  A1C: 5.2 (4/26/2023)  Screening Current   Cholesterol Screening Once every 5 years if you don't have a lipid disorder. May order more often based on risk factors. Lipid panel: 10/20/2023    Screening Not Indicated  History Lipid Disorder     Other Preventive Screenings Covered by Medicare:  Abdominal Aortic Aneurysm (AAA) Screening: covered once if your at risk. You're considered to be at risk if you have a family history of AAA. Lung Cancer Screening: covers low dose CT scan once per year if you meet all of the following conditions: (1) Age 48-67; (2) No signs or symptoms of lung cancer; (3) Current smoker or have quit smoking within the last 15 years; (4) You have a tobacco smoking history of at least 20 pack years (packs per day multiplied by number of years you smoked); (5) You get a written order from a healthcare provider. Glaucoma Screening: covered annually if you're considered high risk: (1) You have diabetes OR (2) Family history of glaucoma OR (3)  aged 48 and older OR (3)  American aged 72 and older  Osteoporosis Screening: covered every 2 years if you meet one of the following conditions: (1) You're estrogen deficient and at risk for osteoporosis based off medical history and other findings; (2) Have a vertebral abnormality; (3) On glucocorticoid therapy for more than 3 months; (4) Have primary hyperparathyroidism; (5) On osteoporosis medications and need to assess response to drug therapy. Last bone density test (DXA Scan): 01/15/2022. HIV Screening: covered annually if you're between the age of 14-79. Also covered annually if you are younger than 13 and older than 72 with risk factors for HIV infection.  For pregnant patients, it is covered up to 3 times per pregnancy. Immunizations:  Immunization Recommendations   Influenza Vaccine Annual influenza vaccination during flu season is recommended for all persons aged >= 6 months who do not have contraindications   Pneumococcal Vaccine   * Pneumococcal conjugate vaccine = PCV13 (Prevnar 13), PCV15 (Vaxneuvance), PCV20 (Prevnar 20)  * Pneumococcal polysaccharide vaccine = PPSV23 (Pneumovax) Adults 59-49 yo with certain risk factors or if 69+ yo  If never received any pneumonia vaccine: recommend Prevnar 20 (PCV20)  Give PCV20 if previously received 1 dose of PCV13 or PPSV23   Hepatitis B Vaccine 3 dose series if at intermediate or high risk (ex: diabetes, end stage renal disease, liver disease)   Respiratory syncytial virus (RSV) Vaccine - COVERED BY MEDICARE PART D  * RSVPreF3 (Arexvy) CDC recommends that adults 61years of age and older may receive a single dose of RSV vaccine using shared clinical decision-making (SCDM)   Tetanus (Td) Vaccine - COST NOT COVERED BY MEDICARE PART B Following completion of primary series, a booster dose should be given every 10 years to maintain immunity against tetanus. Td may also be given as tetanus wound prophylaxis. Tdap Vaccine - COST NOT COVERED BY MEDICARE PART B Recommended at least once for all adults. For pregnant patients, recommended with each pregnancy. Shingles Vaccine (Shingrix) - COST NOT COVERED BY MEDICARE PART B  2 shot series recommended in those 19 years and older who have or will have weakened immune systems or those 50 years and older     Health Maintenance Due:      Topic Date Due   • Breast Cancer Screening: Mammogram  07/06/2024   • Hepatitis C Screening  Completed     Immunizations Due:      Topic Date Due   • COVID-19 Vaccine (6 - Pfizer series) 04/05/2023   • Influenza Vaccine (1) 09/01/2023     Advance Directives   What are advance directives? Advance directives are legal documents that state your wishes and plans for medical care.  These plans are made ahead of time in case you lose your ability to make decisions for yourself. Advance directives can apply to any medical decision, such as the treatments you want, and if you want to donate organs. What are the types of advance directives? There are many types of advance directives, and each state has rules about how to use them. You may choose a combination of any of the following:  Living will: This is a written record of the treatment you want. You can also choose which treatments you do not want, which to limit, and which to stop at a certain time. This includes surgery, medicine, IV fluid, and tube feedings. Durable power of  for healthcare [facility]): This is a written record that states who you want to make healthcare choices for you when you are unable to make them for yourself. This person, called a proxy, is usually a family member or a friend. You may choose more than 1 proxy. Do not resuscitate (DNR) order:  A DNR order is used in case your heart stops beating or you stop breathing. It is a request not to have certain forms of treatment, such as CPR. A DNR order may be included in other types of advance directives. Medical directive: This covers the care that you want if you are in a coma, near death, or unable to make decisions for yourself. You can list the treatments you want for each condition. Treatment may include pain medicine, surgery, blood transfusions, dialysis, IV or tube feedings, and a ventilator (breathing machine). Values history: This document has questions about your views, beliefs, and how you feel and think about life. This information can help others choose the care that you would choose. Why are advance directives important? An advance directive helps you control your care. Although spoken wishes may be used, it is better to have your wishes written down. Spoken wishes can be misunderstood, or not followed. Treatments may be given even if you do not want them.  An advance directive may make it easier for your family to make difficult choices about your care. Urinary Incontinence   Urinary incontinence (UI)  is when you lose control of your bladder. UI develops because your bladder cannot store or empty urine properly. The 3 most common types of UI are stress incontinence, urge incontinence, or both. Medicines:   May be given to help strengthen your bladder control. Report any side effects of medication to your healthcare provider. Do pelvic muscle exercises often:  Your pelvic muscles help you stop urinating. Squeeze these muscles tight for 5 seconds, then relax for 5 seconds. Gradually work up to squeezing for 10 seconds. Do 3 sets of 15 repetitions a day, or as directed. This will help strengthen your pelvic muscles and improve bladder control. Train your bladder:  Go to the bathroom at set times, such as every 2 hours, even if you do not feel the urge to go. You can also try to hold your urine when you feel the urge to go. For example, hold your urine for 5 minutes when you feel the urge to go. As that becomes easier, hold your urine for 10 minutes. Self-care:   Keep a UI record. Write down how often you leak urine and how much you leak. Make a note of what you were doing when you leaked urine. Drink liquids as directed. You may need to limit the amount of liquid you drink to help control your urine leakage. Do not drink any liquid right before you go to bed. Limit or do not have drinks that contain caffeine or alcohol. Prevent constipation. Eat a variety of high-fiber foods. Good examples are high-fiber cereals, beans, vegetables, and whole-grain breads. Walking is the best way to trigger your intestines to have a bowel movement. Exercise regularly and maintain a healthy weight. Weight loss and exercise will decrease pressure on your bladder and help you control your leakage. Use a catheter as directed  to help empty your bladder.  A catheter is a tiny, plastic tube that is put into your bladder to drain your urine. Go to behavior therapy as directed. Behavior therapy may be used to help you learn to control your urge to urinate. © Copyright [employer] 2018 Information is for End User's use only and may not be sold, redistributed or otherwise used for commercial purposes.  All illustrations and images included in CareNotes® are the copyrighted property of [employer].D.A[employer] Inc. or [address] [place] [address]

## 2023-10-27 NOTE — ASSESSMENT & PLAN NOTE
Lab Results   Component Value Date    EGFR 65 10/20/2023    EGFR 60 04/08/2023    EGFR 54 10/20/2022    CREATININE 0.85 10/20/2023    CREATININE 0.91 04/08/2023    CREATININE 1.00 10/20/2022   Stable  Continue monitoring continue limit/avoidance of nephrotoxic agents

## 2023-10-27 NOTE — ASSESSMENT & PLAN NOTE
Well-controlled today: 120/76  Patient asymptomatic  Advised periodic home blood pressures  Continue low-dose lisinopril 2.5  Continue monitoring kidney function as below

## 2023-10-30 ENCOUNTER — TELEPHONE (OUTPATIENT)
Dept: ADMINISTRATIVE | Facility: OTHER | Age: 78
End: 2023-10-30

## 2023-10-30 NOTE — TELEPHONE ENCOUNTER
Upon review of the In Basket request we were able to locate, review, and update the patient chart as requested for CRC: Colonoscopy. This document will not reflect HM    Upon review of the In Basket request we have found that the patient has aged out of the measure and/or the document date is outside of the measure timeframe. Any additional questions or concerns should be emailed to the Practice Liaisons via the appropriate education email address, please do not reply via In Basket.     Thank you  Savanah Marte

## 2023-10-30 NOTE — TELEPHONE ENCOUNTER
----- Message from Renetta Long sent at 10/27/2023  2:13 PM EDT -----  Regarding: QUALITY UPDATE  10/27/23 2:13 PM    Hello, our patient Taryn Perera has had CRC: Colonoscopy completed/performed. Please assist in updating the patient chart by pulling the Care Everywhere (CE) document. The date of service is 2019.      Thank you,  Renetta Long  [facility]

## 2023-11-17 DIAGNOSIS — S80.11XA CONTUSION OF RIGHT LOWER EXTREMITY, INITIAL ENCOUNTER: Primary | ICD-10-CM

## 2023-11-21 ENCOUNTER — HOSPITAL ENCOUNTER (OUTPATIENT)
Dept: ULTRASOUND IMAGING | Facility: HOSPITAL | Age: 78
Discharge: HOME | End: 2023-11-21
Payer: MEDICARE

## 2023-11-21 DIAGNOSIS — S80.11XA CONTUSION OF RIGHT LOWER EXTREMITY, INITIAL ENCOUNTER: ICD-10-CM

## 2023-11-21 PROCEDURE — 76882 US LMTD JT/FCL EVL NVASC XTR: CPT

## 2023-12-12 DIAGNOSIS — S80.11XA CONTUSION OF RIGHT LOWER EXTREMITY, INITIAL ENCOUNTER: Primary | ICD-10-CM

## 2024-01-02 ENCOUNTER — TELEPHONE (OUTPATIENT)
Dept: FAMILY MEDICINE CLINIC | Facility: CLINIC | Age: 79
End: 2024-01-02

## 2024-01-02 ENCOUNTER — OFFICE VISIT (OUTPATIENT)
Dept: URGENT CARE | Facility: CLINIC | Age: 79
End: 2024-01-02
Payer: MEDICARE

## 2024-01-02 VITALS
OXYGEN SATURATION: 99 % | SYSTOLIC BLOOD PRESSURE: 120 MMHG | BODY MASS INDEX: 21.71 KG/M2 | RESPIRATION RATE: 17 BRPM | DIASTOLIC BLOOD PRESSURE: 72 MMHG | HEIGHT: 61 IN | TEMPERATURE: 97 F | HEART RATE: 71 BPM | WEIGHT: 115 LBS

## 2024-01-02 DIAGNOSIS — U07.1 COVID-19: Primary | ICD-10-CM

## 2024-01-02 PROCEDURE — G0463 HOSPITAL OUTPT CLINIC VISIT: HCPCS

## 2024-01-02 PROCEDURE — 99213 OFFICE O/P EST LOW 20 MIN: CPT

## 2024-01-02 NOTE — TELEPHONE ENCOUNTER
Yes, this is Kaila Mcgregor. I see Shereen Gonzalez, I just tested positive for COVID and I don't know if there is some medication that they usually give now when you've tested positive. So if somebody would call me back, [phone]. And I had tested a couple days before it was negative, but today, this morning it was positive. Thank you. Bye.    Sx started over the weekend. No appointment availability. Advised pt to log in to [employer] to do a virtual with [facility] provider available if she's interested in discussing antviral treatment.

## 2024-01-02 NOTE — PROGRESS NOTES
[facility]        NAME: Kaila Mcgregor is a 78 y.o. female  : 1945    MRN: [id number]  DATE: 2024  TIME: 2:12 PM    Assessment and Plan   COVID-19 [U07.1]  1. COVID-19          Covid positive test today at home.  Shared decision to not start antiviral as her symptoms are mild, she is no acute respiratory distress. Had hike total 7 miles in last 2 days.    Patient Instructions   Anti-viral not recommended at this time as you have stated that you gauge this as mild and you have had sinus infections worse than this and also the concern for side effects from Antivirals, the risk vs benefits is not clearly going in the direction of taking Antivirals.   Take Robitussin as needed for cough.  Isolate until 2024 and then can be around others with mask on for 5 more days from then.    Proceed to ER if symptoms worsen.    Chief Complaint     Chief Complaint   Patient presents with   • COVID-19     Tested positive this morning, headache and dry cough.          History of Present Illness       Friend was COVID positive about 4 days ago. States symptoms of cough and congestion starting 3 days ago. Tested for COVID 2 days ago and was negative. This morning tested positive for COVID. States she thought she had a cold the last several days and had gone on a 4 mile hike 2 days ago and then 3 mile hike yesterday.        Review of Systems   Review of Systems   Constitutional:  Negative for fever.   HENT:  Positive for congestion. Negative for sore throat.    Respiratory:  Positive for cough. Negative for shortness of breath.    Gastrointestinal:  Negative for abdominal pain, diarrhea, nausea and vomiting.   Neurological:  Negative for dizziness, weakness, light-headedness, numbness and headaches.         Current Medications       Current Outpatient Medications:   •  Calcium Carbonate-Vitamin D3 600-400 MG-UNIT TABS, Take by mouth, Disp: , Rfl:   •  Cholecalciferol 25 MCG (1000 UT) tablet, Take 1,000  Units by mouth daily, Disp: , Rfl:   •  estradiol (ESTRACE) 0.1 mg/g vaginal cream, Insert 0.5 g into the vagina 2 (two) times a week, Disp: 42.5 g, Rfl: 1  •  ibuprofen (MOTRIN) 200 mg tablet, Take 200 mg by mouth if needed, Disp: , Rfl:   •  lisinopril (ZESTRIL) 2.5 mg tablet, TAKE 1 TABLET BY MOUTH EVERY DAY, Disp: 90 tablet, Rfl: 1  •  Lutein 20 MG CAPS, Take by mouth, Disp: , Rfl:   •  Polyethyl Glycol-Propyl Glycol (SYSTANE OP), Apply to eye, Disp: , Rfl:   •  pravastatin (PRAVACHOL) 10 mg tablet, Take 5 days per week, Disp: 60 tablet, Rfl: 5    Current Allergies     Allergies as of 01/02/2024 - Reviewed 01/02/2024   Allergen Reaction Noted   • Erythromycin Rash 02/12/2018            The following portions of the patient's history were reviewed and updated as appropriate: allergies, current medications, past family history, past medical history, past social history, past surgical history and problem list.     Past Medical History:   Diagnosis Date   • Allergic 35 yrs ago   • Arthritis    • Chronic kidney disease    • HL (hearing loss) wear HA's   • Hyperlipidemia    • Hypertension        Past Surgical History:   Procedure Laterality Date   • BLADDER SURGERY      LAST ASSESSED 11APR2016   • DILATION AND CURETTAGE OF UTERUS      LAST ASSESSED 11APR2016   • EYE SURGERY  cataracts    2022   • TOOTH EXTRACTION      LAST ASSESSED 11APR2016       Family History   Problem Relation Age of Onset   • Dementia Mother    • Hypertension Mother    • Skin cancer Mother         age unknown   • Hearing loss Mother    • Vision loss Mother    • No Known Problems Father    • No Known Problems Daughter    • No Known Problems Daughter    • No Known Problems Maternal Grandmother    • No Known Problems Maternal Grandfather    • No Known Problems Paternal Grandmother    • No Known Problems Paternal Grandfather    • No Known Problems Paternal Aunt          Medications have been verified.        Objective   /72   Pulse 71   Temp  "(!) 97 °F (36.1 °C)   Resp 17   Ht 5' 1\" (1.549 m)   Wt 52.2 kg (115 lb)   SpO2 99%   BMI 21.73 kg/m²   No LMP recorded. Patient is postmenopausal.       Physical Exam     Physical Exam  Vitals and nursing note reviewed.   Constitutional:       Appearance: Normal appearance.   HENT:      Head: Normocephalic and atraumatic.      Nose: Congestion present.   Cardiovascular:      Rate and Rhythm: Normal rate.      Pulses: Normal pulses.      Heart sounds: Normal heart sounds.   Pulmonary:      Effort: Pulmonary effort is normal. No respiratory distress.      Breath sounds: Normal breath sounds. No wheezing, rhonchi or rales.   Abdominal:      Tenderness: There is no abdominal tenderness.   Skin:     General: Skin is warm and dry.      Capillary Refill: Capillary refill takes less than 2 seconds.   Neurological:      General: No focal deficit present.      Mental Status: She is alert and oriented to person, place, and time. Mental status is at baseline.      Sensory: No sensory deficit.      Motor: No weakness.   Psychiatric:         Mood and Affect: Mood normal.         Behavior: Behavior normal.         Thought Content: Thought content normal.                   "

## 2024-01-15 NOTE — PROGRESS NOTES
Assessment/Plan:   Kaila Mcgregor is a 78 y.o.female who is here for No chief complaint on file.    Right lower lateral extremity, small bruise measuring maybe 2 cm.  This is visible and palpable and malleable.  She had an ultrasound in 12/12/2023 that showed a 3 cm ill-defined collection possibly consistent with a hematoma.    At this point I do not feel she needs any surgical intervention.  She does have rather minimal pulses in that lower extremity but no vascular changes.    Lets perform a 4-month follow-up ultrasound and reexamine.  If it is bigger or changes in size or characteristic then we may need an MRI.  Likely this is just a hematoma from her trauma back in the fall.      Plan  1.  Ultrasound right lower extremity lateral aspect for 3 cm hematoma now seemingly decreasing by clinical exam.          _______________________________________________________  CC:No chief complaint on file.  .    HPI:  Kaila Mcgregor is a 78 y.o.female who was referred for evaluation of No chief complaint on file.  .    Currently patient reports in the fall she was [place] and hit her leg against a piece of wood.  There is no break in skin or bleeding but there was a lump that developed there.  She felt it should be better by now so she is here to have it checked.  She has no numbness tingling or neurovascular symptoms.  Has a history of poor pulses in that leg which her exam today supports        US left lower extremity:  IMPRESSION:     Nonspecific subcutaneous collection, possibly hematoma in the setting of history of contusion. Infectious etiology is not excluded.    ROS:  General ROS: negative  negative for - chills, fatigue, fever or night sweats, weight loss  Respiratory ROS: no cough, shortness of breath, or wheezing  Cardiovascular ROS: no chest pain or dyspnea on exertion  Genito-Urinary ROS: no dysuria, trouble voiding, or hematuria  Musculoskeletal ROS: negative for - gait disturbance, joint  pain or muscle pain  Neurological ROS: no TIA or stroke symptoms  Skin ROS: See HPI  GI ROS: see HPI  Skin ROS: no new rashes or lesions   Lymphatic ROS: no new adenopathy noted by pt.   Psy ROS: no new mental or behavioral disturbances       Patient Active Problem List   Diagnosis   • Essential hypertension   • Hyperlipidemia   • Generalized osteoarthritis   • Sciatica   • Seasonal allergies   • Medicare annual wellness visit, subsequent   • Chondromalacia patellae   • Ankylosis of knee joint   • Achilles tendinitis   • Achilles bursitis   • Upper respiratory tract infection   • Stage 3a chronic kidney disease (HCC)   • Enlarged lymph node in neck   • Chronic pain of right knee   • Elevated bilirubin   • Herpes labialis         Allergies:  Shellfish allergy - food allergy and Erythromycin      Current Outpatient Medications:   •  Calcium Carbonate-Vitamin D3 600-400 MG-UNIT TABS, Take by mouth, Disp: , Rfl:   •  Cholecalciferol 25 MCG (1000 UT) tablet, Take 1,000 Units by mouth daily, Disp: , Rfl:   •  estradiol (ESTRACE) 0.1 mg/g vaginal cream, Insert 0.5 g into the vagina 2 (two) times a week, Disp: 42.5 g, Rfl: 1  •  ibuprofen (MOTRIN) 200 mg tablet, Take 200 mg by mouth if needed, Disp: , Rfl:   •  lisinopril (ZESTRIL) 2.5 mg tablet, TAKE 1 TABLET BY MOUTH EVERY DAY, Disp: 90 tablet, Rfl: 1  •  Lutein 20 MG CAPS, Take by mouth, Disp: , Rfl:   •  Polyethyl Glycol-Propyl Glycol (SYSTANE OP), Apply to eye, Disp: , Rfl:   •  pravastatin (PRAVACHOL) 10 mg tablet, Take 5 days per week, Disp: 60 tablet, Rfl: 5    Past Medical History:   Diagnosis Date   • Allergic 35 yrs ago   • Arthritis    • Chronic kidney disease    • HL (hearing loss) wear HA's   • Hyperlipidemia    • Hypertension        Past Surgical History:   Procedure Laterality Date   • BLADDER SURGERY      LAST ASSESSED 11APR2016   • DILATION AND CURETTAGE OF UTERUS      LAST ASSESSED 11APR2016   • EYE SURGERY  cataracts    2022   • TOOTH EXTRACTION       LAST ASSESSED [id number]       Family History   Problem Relation Age of Onset   • Dementia Mother    • Hypertension Mother    • Skin cancer Mother         age unknown   • Hearing loss Mother    • Vision loss Mother    • No Known Problems Father    • No Known Problems Daughter    • No Known Problems Daughter    • No Known Problems Maternal Grandmother    • No Known Problems Maternal Grandfather    • No Known Problems Paternal Grandmother    • No Known Problems Paternal Grandfather    • No Known Problems Paternal Aunt         reports that she has never smoked. She has never used smokeless tobacco. She reports current alcohol use of about 5.0 standard drinks of alcohol per week. She reports that she does not use drugs.    Vitals:    01/18/24 1354   BP: 100/68   Pulse: 57   Resp: 16   Temp: 98.2 °F (36.8 °C)   SpO2: 97%        PHYSICAL EXAM  General Appearance:    Alert, cooperative, no distress,    Head:    Normocephalic without obvious abnormality   Eyes:    PERRL, conjunctiva/corneas clear     Neck:   Supple, no adenopathy, no JVD   Back:     Symmetric, no spinal or CVA tenderness   Lungs:     Clear to auscultation bilaterally, no wheezing or rhonchi   Heart:    Regular rate and rhythm, S1 and S2 normal, no murmur   Abdomen:     Benign, no rebound or guarding.    Extremities:   Extremities normal. No clubbing, cyanosis or edema   Psych:   Normal Affect, AOx3.    Neurologic:  Skin:   CNII-XII intact. Strength symmetric, speech intact    Warm, dry, intact, no visible rashes or lesions except as follows:   Right lower extremity just above the ankle is about a 2 to 3 cm hematoma type bruise that is palpable and not particularly tender.    Nonpalpable dorsalis pedis, barely palpable tibial pulse medially.  No real vascular changes of the lower extremity however.                 Lisa Calvin PA-C    Date: 1/18/2024 Time: 2:22 PM

## 2024-01-18 ENCOUNTER — OFFICE VISIT (OUTPATIENT)
Dept: SURGERY | Facility: CLINIC | Age: 79
End: 2024-01-18
Payer: MEDICARE

## 2024-01-18 VITALS
DIASTOLIC BLOOD PRESSURE: 68 MMHG | OXYGEN SATURATION: 97 % | HEIGHT: 61 IN | WEIGHT: 116 LBS | BODY MASS INDEX: 21.9 KG/M2 | TEMPERATURE: 98.2 F | HEART RATE: 57 BPM | RESPIRATION RATE: 16 BRPM | SYSTOLIC BLOOD PRESSURE: 100 MMHG

## 2024-01-18 DIAGNOSIS — S80.11XA HEMATOMA OF RIGHT LOWER EXTREMITY, INITIAL ENCOUNTER: Primary | ICD-10-CM

## 2024-01-18 DIAGNOSIS — R60.9 EDEMA, UNSPECIFIED: ICD-10-CM

## 2024-01-18 DIAGNOSIS — S80.11XA CONTUSION OF RIGHT LOWER EXTREMITY, INITIAL ENCOUNTER: ICD-10-CM

## 2024-01-18 PROCEDURE — 99203 OFFICE O/P NEW LOW 30 MIN: CPT | Performed by: PHYSICIAN ASSISTANT

## 2024-02-14 DIAGNOSIS — I10 ESSENTIAL HYPERTENSION: ICD-10-CM

## 2024-02-14 RX ORDER — LISINOPRIL 2.5 MG/1
TABLET ORAL
Qty: 90 TABLET | Refills: 1 | Status: SHIPPED | OUTPATIENT
Start: 2024-02-14

## 2024-02-21 PROBLEM — Z00.00 MEDICARE ANNUAL WELLNESS VISIT, SUBSEQUENT: Status: RESOLVED | Noted: 2018-10-04 | Resolved: 2024-02-21

## 2024-03-18 ENCOUNTER — OFFICE VISIT (OUTPATIENT)
Dept: GYNECOLOGY | Facility: CLINIC | Age: 79
End: 2024-03-18
Payer: MEDICARE

## 2024-03-18 VITALS
SYSTOLIC BLOOD PRESSURE: 114 MMHG | WEIGHT: 116 LBS | DIASTOLIC BLOOD PRESSURE: 60 MMHG | BODY MASS INDEX: 21.9 KG/M2 | HEIGHT: 61 IN

## 2024-03-18 DIAGNOSIS — N81.11 CYSTOCELE, MIDLINE: Primary | ICD-10-CM

## 2024-03-18 DIAGNOSIS — N95.2 VAGINAL ATROPHY: ICD-10-CM

## 2024-03-18 PROCEDURE — 99213 OFFICE O/P EST LOW 20 MIN: CPT | Performed by: OBSTETRICS & GYNECOLOGY

## 2024-03-18 NOTE — PROGRESS NOTES
"Assessment/Plan:    Prolapse reviewed at length. Given lack of pelvic floor muscle coordination, will refer to pelvic floor PT. Pt advised to notify the office with any worsening prolapse, voiding dysfunction. Will continue vaginal estrogen 1/2 gm 2x per week.     Diagnoses and all orders for this visit:    Cystocele, midline  -     Ambulatory Referral to Physical Therapy; Future        Subjective:      Patient ID: Kaila Mcgregor is a 78 y.o. female.    Pt presents for vaginal prolapse.   She was noted to have anterior vaginal wall relaxation at last visit in 2021. She states she feels like it may be a little more pronounced.   Has rare urgency. No UUI or RAE. Has an occasional sense of incomplete emptying at the end of the day. Denies hx of UTIs.   Normal BM with some post BM staining she feels may be related to hemorrhoids.   Using vaginal estrogen cream 1/2 gm 2x per week.         The following portions of the patient's history were reviewed and updated as appropriate: allergies, current medications, past family history, past medical history, past social history, past surgical history and problem list.    Review of Systems   Constitutional: Negative.    Respiratory: Negative.     Cardiovascular: Negative.    Gastrointestinal: Negative.    Endocrine: Negative.    Genitourinary:  Negative for dyspareunia, dysuria, frequency, pelvic pain, vaginal bleeding, vaginal discharge and vaginal pain.   Musculoskeletal: Negative.    Skin: Negative.    Neurological: Negative.    Psychiatric/Behavioral: Negative.           Objective:      /60   Ht 5' 1\" (1.549 m)   Wt 52.6 kg (116 lb)   BMI 21.92 kg/m²          Physical Exam  Vitals and nursing note reviewed.   Constitutional:       Appearance: Normal appearance.   HENT:      Head: Normocephalic and atraumatic.   Pulmonary:      Effort: Pulmonary effort is normal.   Abdominal:      Hernia: There is no hernia in the left inguinal area or right inguinal area. "   Genitourinary:     General: Normal vulva.      Exam position: Supine.      Pubic Area: No rash.       Labia:         Right: No rash, tenderness, lesion or injury.         Left: No rash, tenderness, lesion or injury.       Urethra: No urethral pain, urethral swelling or urethral lesion.      Vagina: No signs of injury and foreign body. Prolapsed vaginal walls (anterior wall noted at introitus, minimal relaxation of posterior wall, uterus well supported) present. No vaginal discharge, erythema, tenderness, bleeding or lesions.      Comments: Pelvic floor muscles uncoordinated  Musculoskeletal:         General: Normal range of motion.      Cervical back: Normal range of motion.   Lymphadenopathy:      Lower Body: No right inguinal adenopathy. No left inguinal adenopathy.   Skin:     General: Skin is warm and dry.   Neurological:      Mental Status: She is alert and oriented to person, place, and time.   Psychiatric:         Mood and Affect: Mood normal.         Behavior: Behavior normal.         Thought Content: Thought content normal.         Judgment: Judgment normal.

## 2024-03-19 ENCOUNTER — HOSPITAL ENCOUNTER (OUTPATIENT)
Dept: NON INVASIVE DIAGNOSTICS | Facility: CLINIC | Age: 79
Discharge: HOME | End: 2024-03-19
Payer: MEDICARE

## 2024-03-19 DIAGNOSIS — R60.9 EDEMA, UNSPECIFIED: ICD-10-CM

## 2024-03-19 DIAGNOSIS — S80.11XA CONTUSION OF RIGHT LOWER EXTREMITY, INITIAL ENCOUNTER: ICD-10-CM

## 2024-03-19 DIAGNOSIS — S80.11XA HEMATOMA OF RIGHT LOWER EXTREMITY, INITIAL ENCOUNTER: ICD-10-CM

## 2024-03-19 PROCEDURE — 93970 EXTREMITY STUDY: CPT | Performed by: SURGERY

## 2024-03-19 PROCEDURE — 93970 EXTREMITY STUDY: CPT

## 2024-03-29 DIAGNOSIS — E78.5 HYPERLIPIDEMIA, UNSPECIFIED HYPERLIPIDEMIA TYPE: ICD-10-CM

## 2024-03-29 RX ORDER — PRAVASTATIN SODIUM 10 MG
TABLET ORAL
Qty: 180 TABLET | Refills: 1 | Status: SHIPPED | OUTPATIENT
Start: 2024-03-29

## 2024-04-24 ENCOUNTER — PATIENT MESSAGE (OUTPATIENT)
Dept: FAMILY MEDICINE CLINIC | Facility: CLINIC | Age: 79
End: 2024-04-24

## 2024-04-25 ENCOUNTER — EVALUATION (OUTPATIENT)
Dept: PHYSICAL THERAPY | Facility: REHABILITATION | Age: 79
End: 2024-04-25
Payer: MEDICARE

## 2024-04-25 DIAGNOSIS — N81.11 CYSTOCELE, MIDLINE: Primary | ICD-10-CM

## 2024-04-25 DIAGNOSIS — N81.89 PELVIC FLOOR WEAKNESS: ICD-10-CM

## 2024-04-25 DIAGNOSIS — R39.198 DIFFICULTY VOIDING: ICD-10-CM

## 2024-04-25 PROCEDURE — 97161 PT EVAL LOW COMPLEX 20 MIN: CPT | Performed by: PHYSICAL THERAPIST

## 2024-04-25 PROCEDURE — 97140 MANUAL THERAPY 1/> REGIONS: CPT | Performed by: PHYSICAL THERAPIST

## 2024-04-25 NOTE — PROGRESS NOTES
PT Evaluation     Today's date: 2024  Patient name: Kaila Mcgregor  : 1945  MRN: [id number]  Referring provider: Tory De Leon CRNP  Dx:   Encounter Diagnosis     ICD-10-CM    1. Cystocele, midline  N81.11 Ambulatory Referral to Physical Therapy      2. Difficulty voiding  R39.198       3. Pelvic floor weakness  N81.89           Start Time: 1103  Stop Time: 1200  Total time in clinic (min): 57 minutes    Assessment  Assessment details: Patient is a 78 y.o. female who presents to physical therapy with diagnosis of cystocely. Internal assessment was performed following verbal consent from pt. Functional impairments include prolapse and difficulty voiding and emptying bladder. Physical findings include pelvic floor muscle tension, pelvic floor weakness and poor coordination among breathing, core, and pelvic floor muscles. Patient would benefit from formal physical therapy to address impairments as detailed, decrease pain, and restore maximal level of function for all home and mobility tasks. Educated patient regarding plan of care and answered all patient questions to patient satisfaction. Thank you for this referral.    Impairments: abnormal muscle tone  Understanding of Dx/Px/POC: good   Prognosis: good    Goals  Short term goals (to be met by 4 weeks)    -Pt will be able to perform PFM contraction with good coordination and breathing  -Pt will verbalize understanding of prolapse lifting and exercises modifications     Long Term Goals (to be met by discharge    -Pt will be independent with HEP  -Pt will verbalize understanding of position/posture for improved voiding            Plan  Plan details: Cont per POC 1-2x/week for 8-12 weeks. Perform objective assessment NV.   Patient would benefit from: skilled physical therapy  Planned modality interventions: biofeedback, cryotherapy and thermotherapy: hydrocollator packs  Planned therapy interventions: IASTM, joint mobilization, manual therapy,  "neuromuscular re-education, therapeutic activities, therapeutic exercise, stretching, strengthening, flexibility, functional ROM exercises, home exercise program, abdominal trunk stabilization and balance  Treatment plan discussed with: patient      PT Pelvic Floor Subjective:   History of Present Illness:   Pt comes to PT with dx of Cystocele.   Pt saw her physician because her prolpase felt like it was getting lower. She had a bladder lift 14 years ago with Dr. Fleming. Recently she did not feel like she was emptying her bladder as well and she could change position or press her bladder and she would void more. Pt inserts Estradiol cream and when she inserts it she can feel where her prolpase was. Pt had an examination and was told to come to PT. She wanted her to continue with Estradiol. She was also told she was doing an incorrect PFM contraction.     From GYN note: \" Prolapsed vaginal walls (anterior wall noted at introitus, minimal relaxation of posterior wall, uterus well supported) \"  Social Support:     Lives in:  One-story house    Lives with:  Alone    Work status: unemployed (volunteer work)    Life stress severity: mild    History of Depression: no  Diet and Exercise:    Diet:balanced nutrition    Pickelball 2x.week  Water aerobics 2x/week  Walking/hiking   No worsening of symptoms with these activities   OB/ gyn History    Gestational History:     Prior Pregnancy: Yes      Number of prior pregnancies: 3    Number of term pregnancies: 2    Delivery Type: vaginal delivery      Menstrual History:      Menopausal: menopause  Bladder Function:     Voiding Difficulties positive for: urgency and incomplete emptying      Voiding Difficulties negative for: frequent urination, hesitancy and straining       Voiding Difficulties comments:     Voiding frequency: every 3-4 hours    Urinary leakage: no urine leakage    Urinary leakage aggravated by: post-void dribble    Nocturia (episodes per night): 1    Painful " "urination: No      Intake (ounces): Water: 32, Coffee: 8, Tea: 8,     Intake (ounces) comment: Pt looks at her pee and it is yellow but not dark yellow  She eats lots of fruits and purees food  Bowel Function:     Voiding DIfficulties: unfinished feeling after defecating      Bowel Function comments:  Pt was having problems with looser stools but took a probiotic which helped     Bowel frequency: daily and multiple times a day    [place] Stool Scale: type 4    Stool softener use: no stool softeners    Enema use: no enema    Uses \"squatty potty\": no Squatty Potty  Sexual Function:     Sexually Active:  Sexually active    Pain during intercourse: No    Pain:     No pain reported by patient.  Patient Goals:     Other patient goals:  That her prolpase does not progress any further and not have any surgery      Objective    Pt provided consent prior to and throughout initiation of external/internal assessment  Pt declined second person in the room      Position: supine exam      General Perineum Exam:   Perineum intact: Y  Pelvic organ prolapse at rest: N     Visual Inspection of Perineum:   Excursion of perineal body in cephalad direction with contraction of pelvic floor muscles (PFM): poor, squeezes glutes  Excursion of perineal body in caudal direction with relaxation of pelvic floor muscles (PFM): poor  Involuntary relaxation with bearing down: Y    Involuntary contraction with coughing: N  Anal Magnet: present  Cotton swab test: non-tender  Sensation: intact     Pelvic Organ Prolapse   Position: hook-lying  At rest: none  With bearing down: Y  Location: anterior grade 2      Pelvic Floor Muscle Exam     Pt required sig vc/tc to actiate PFM. Able to do so 1x with cue for \"sucking in through a straw\" but unable to replacte a second time. PT cued pt for ADD contracction and TAC ocntraction, each which alowed pt to activate PFM     Tenderness to palpation: Layer 3 L OI, coccygeus, iliococcygeus and R OI and " coccygeus         Precautions: hx of HTN, bladder suspension surgery  Impairments/functional limitations: prolapse/cystocele, incomplete voiding  Daily Treatment Diary    Manuals 4/25         External assess performed         Internal assess performed         Internal manual therapy Layer 3 coccygeus, iliococcogeus, OI bl                   Neuro Re-Ed          TAC          PF Performed, vc/tc, with exhale, with TAC, with ADD daina         TAC+PF          TAC+PF with march          TAC+PF with alt knee fall out          TAC+PF with ball squeeze          TAC+PF with SLR          TAC+PF with s/l ABD          TAC+PF with bridge                              360 deg breathing          Ther Ex          Supine piri stretch          LTR          Seated piri stretch          Supine butterfly stretch                                                  Ther Activity                    The knack          Prolapse lifting/modifications          Gait Training                              Modalities

## 2024-04-26 ENCOUNTER — APPOINTMENT (OUTPATIENT)
Dept: LAB | Facility: CLINIC | Age: 79
End: 2024-04-26
Payer: MEDICARE

## 2024-04-26 DIAGNOSIS — M85.80 OSTEOPENIA, UNSPECIFIED LOCATION: ICD-10-CM

## 2024-04-26 DIAGNOSIS — I10 ESSENTIAL HYPERTENSION: ICD-10-CM

## 2024-04-26 DIAGNOSIS — Z13.0 SCREENING, ANEMIA, DEFICIENCY, IRON: ICD-10-CM

## 2024-04-26 DIAGNOSIS — I10 ESSENTIAL HYPERTENSION: Primary | ICD-10-CM

## 2024-04-26 LAB
ALBUMIN SERPL BCP-MCNC: 4.3 G/DL (ref 3.5–5)
ALP SERPL-CCNC: 54 U/L (ref 34–104)
ALT SERPL W P-5'-P-CCNC: 15 U/L (ref 7–52)
ANION GAP SERPL CALCULATED.3IONS-SCNC: 9 MMOL/L (ref 4–13)
AST SERPL W P-5'-P-CCNC: 18 U/L (ref 13–39)
BASOPHILS # BLD AUTO: 0.07 THOUSANDS/ÂΜL (ref 0–0.1)
BASOPHILS NFR BLD AUTO: 1 % (ref 0–1)
BILIRUB SERPL-MCNC: 1.72 MG/DL (ref 0.2–1)
BUN SERPL-MCNC: 25 MG/DL (ref 5–25)
CALCIUM SERPL-MCNC: 9.4 MG/DL (ref 8.4–10.2)
CHLORIDE SERPL-SCNC: 104 MMOL/L (ref 96–108)
CO2 SERPL-SCNC: 27 MMOL/L (ref 21–32)
CREAT SERPL-MCNC: 0.83 MG/DL (ref 0.6–1.3)
EOSINOPHIL # BLD AUTO: 0.17 THOUSAND/ÂΜL (ref 0–0.61)
EOSINOPHIL NFR BLD AUTO: 3 % (ref 0–6)
ERYTHROCYTE [DISTWIDTH] IN BLOOD BY AUTOMATED COUNT: 12.7 % (ref 11.6–15.1)
GFR SERPL CREATININE-BSD FRML MDRD: 67 ML/MIN/1.73SQ M
GLUCOSE P FAST SERPL-MCNC: 97 MG/DL (ref 65–99)
HCT VFR BLD AUTO: 42.2 % (ref 34.8–46.1)
HGB BLD-MCNC: 13.5 G/DL (ref 11.5–15.4)
IMM GRANULOCYTES # BLD AUTO: 0.03 THOUSAND/UL (ref 0–0.2)
IMM GRANULOCYTES NFR BLD AUTO: 1 % (ref 0–2)
LYMPHOCYTES # BLD AUTO: 1.02 THOUSANDS/ÂΜL (ref 0.6–4.47)
LYMPHOCYTES NFR BLD AUTO: 17 % (ref 14–44)
MCH RBC QN AUTO: 30.7 PG (ref 26.8–34.3)
MCHC RBC AUTO-ENTMCNC: 32 G/DL (ref 31.4–37.4)
MCV RBC AUTO: 96 FL (ref 82–98)
MONOCYTES # BLD AUTO: 0.4 THOUSAND/ÂΜL (ref 0.17–1.22)
MONOCYTES NFR BLD AUTO: 7 % (ref 4–12)
NEUTROPHILS # BLD AUTO: 4.41 THOUSANDS/ÂΜL (ref 1.85–7.62)
NEUTS SEG NFR BLD AUTO: 71 % (ref 43–75)
NRBC BLD AUTO-RTO: 0 /100 WBCS
PLATELET # BLD AUTO: 170 THOUSANDS/UL (ref 149–390)
PMV BLD AUTO: 13 FL (ref 8.9–12.7)
POTASSIUM SERPL-SCNC: 4.3 MMOL/L (ref 3.5–5.3)
PROT SERPL-MCNC: 6.8 G/DL (ref 6.4–8.4)
RBC # BLD AUTO: 4.4 MILLION/UL (ref 3.81–5.12)
SODIUM SERPL-SCNC: 140 MMOL/L (ref 135–147)
WBC # BLD AUTO: 6.1 THOUSAND/UL (ref 4.31–10.16)

## 2024-04-26 PROCEDURE — 36415 COLL VENOUS BLD VENIPUNCTURE: CPT

## 2024-04-26 PROCEDURE — 85025 COMPLETE CBC W/AUTO DIFF WBC: CPT

## 2024-04-26 PROCEDURE — 80053 COMPREHEN METABOLIC PANEL: CPT

## 2024-04-26 NOTE — PATIENT COMMUNICATION
[facility] called, patient is at the lab and lab order is not visible at this time. Warm TSF to Justina Jarquin, provider entered lab orders.

## 2024-04-30 ENCOUNTER — OFFICE VISIT (OUTPATIENT)
Dept: FAMILY MEDICINE CLINIC | Facility: CLINIC | Age: 79
End: 2024-04-30
Payer: MEDICARE

## 2024-04-30 VITALS
TEMPERATURE: 98.3 F | SYSTOLIC BLOOD PRESSURE: 106 MMHG | BODY MASS INDEX: 21.05 KG/M2 | WEIGHT: 114.4 LBS | OXYGEN SATURATION: 99 % | HEART RATE: 64 BPM | DIASTOLIC BLOOD PRESSURE: 68 MMHG | HEIGHT: 62 IN

## 2024-04-30 DIAGNOSIS — N18.31 STAGE 3A CHRONIC KIDNEY DISEASE (HCC): ICD-10-CM

## 2024-04-30 DIAGNOSIS — Z12.31 ENCOUNTER FOR SCREENING MAMMOGRAM FOR BREAST CANCER: ICD-10-CM

## 2024-04-30 DIAGNOSIS — Z78.0 POST-MENOPAUSAL: ICD-10-CM

## 2024-04-30 DIAGNOSIS — M85.80 OSTEOPENIA, UNSPECIFIED LOCATION: ICD-10-CM

## 2024-04-30 DIAGNOSIS — E78.5 HYPERLIPIDEMIA, UNSPECIFIED HYPERLIPIDEMIA TYPE: ICD-10-CM

## 2024-04-30 DIAGNOSIS — Z13.29 SCREENING FOR THYROID DISORDER: ICD-10-CM

## 2024-04-30 DIAGNOSIS — Z13.0 SCREENING, ANEMIA, DEFICIENCY, IRON: ICD-10-CM

## 2024-04-30 DIAGNOSIS — I10 ESSENTIAL HYPERTENSION: Primary | ICD-10-CM

## 2024-04-30 PROCEDURE — 99214 OFFICE O/P EST MOD 30 MIN: CPT | Performed by: NURSE PRACTITIONER

## 2024-04-30 PROCEDURE — G2211 COMPLEX E/M VISIT ADD ON: HCPCS | Performed by: NURSE PRACTITIONER

## 2024-04-30 NOTE — ASSESSMENT & PLAN NOTE
Lipids remain stable at last check in October 2023  Total 212;   Currently taking Pravastatin 10 - 5 times weekly.   Tolerating well without myalgias.   Already eating well balanced/heatlhy diet.   Very active - exercising daily.   Reviewed  ASCVD risk: 26.9 - decreased again  Recheck lipids at  wellness in October  Fasting order in

## 2024-04-30 NOTE — ASSESSMENT & PLAN NOTE
Stable: 106/68  Patient asymptomatic  Advised periodic home blood pressures  Continue low-dose lisinopril 2.5  Continue monitoring kidney function as below  F/U 6 month  Return to care sooner with any concerns

## 2024-04-30 NOTE — PROGRESS NOTES
Formerly [facility], 1928–1983 GROUP    ASSESSMENT AND PLAN     1. Essential hypertension  Assessment & Plan:  Stable: 106/68  Patient asymptomatic  Advised periodic home blood pressures  Continue low-dose lisinopril 2.5  Continue monitoring kidney function as below  F/U 6 month  Return to care sooner with any concerns    Orders:  -     CBC and differential; Future  -     Comprehensive metabolic panel; Future    2. Encounter for screening mammogram for breast cancer  -     Mammo screening bilateral w 3d & cad; Future    3. Hyperlipidemia, unspecified hyperlipidemia type  Assessment & Plan:  Lipids remain stable at last check in October 2023  Total 212;   Currently taking Pravastatin 10 - 5 times weekly.   Tolerating well without myalgias.   Already eating well balanced/heatlhy diet.   Very active - exercising daily.   Reviewed  ASCVD risk: 26.9 - decreased again  Recheck lipids at  wellness in October Fasting order in       Orders:  -     Lipid panel; Future    4. Stage 3a chronic kidney disease (HCC)  -     Comprehensive metabolic panel; Future    5. Screening, anemia, deficiency, iron  -     CBC and differential; Future    6. Osteopenia, unspecified location  -     DXA bone density spine hip and pelvis; Future; Expected date: 04/30/2024    7. Screening for thyroid disorder  -     TSH, 3rd generation with Free T4 reflex; Future    8. Post-menopausal  -     DXA bone density spine hip and pelvis; Future; Expected date: 04/30/2024       NOTE: labs ordered to complete prior to  AWV 6 month    SUBJECTIVE       Patient ID: Kaila Mcgregor is a 78 y.o. female.    Chief Complaint   Patient presents with    Follow-up     6 month follow up       HISTORY OF PRESENT ILLNESS    Routine checkup  PCP manages hypertension and hyperlipidemia.  Patient reports compliance with atorvastatin and lisinopril.  Reports feeling well. Continues eating well/healthy, exercising daily-very active: Hiking, pickleball.  Recently traveled to  [place].  Followed up with GYN for vaginal prolapse.  She is currently in pelvic floor therapy.  No other healthcare concerns at this time          The following portions of the patient's history were reviewed and updated as appropriate: allergies, current medications, past family history, past medical history, past social history, past surgical history, and problem list.    REVIEW OF SYSTEMS  Review of Systems   Constitutional: Negative.    Respiratory: Negative.     Cardiovascular: Negative.    Gastrointestinal: Negative.    Genitourinary: Negative.    Musculoskeletal: Negative.    Neurological: Negative.    Psychiatric/Behavioral: Negative.         OBJECTIVE      VITAL SIGNS  /68 (BP Location: Right arm, Patient Position: Sitting, Cuff Size: Adult)   Pulse 64   Temp 98.3 °F (36.8 °C)   Ht 5' 1.81\" (1.57 m)   Wt 51.9 kg (114 lb 6.4 oz)   SpO2 99%   BMI 21.05 kg/m²     CURRENT MEDICATIONS    Current Outpatient Medications:     Calcium Carbonate-Vitamin D3 600-400 MG-UNIT TABS, Take by mouth, Disp: , Rfl:     Cholecalciferol 25 MCG (1000 UT) tablet, Take 1,000 Units by mouth daily, Disp: , Rfl:     estradiol (ESTRACE) 0.1 mg/g vaginal cream, Insert 0.5 g into the vagina 2 (two) times a week, Disp: 42.5 g, Rfl: 1    ibuprofen (MOTRIN) 200 mg tablet, Take 200 mg by mouth if needed, Disp: , Rfl:     lisinopril (ZESTRIL) 2.5 mg tablet, TAKE 1 TABLET BY MOUTH EVERY DAY, Disp: 90 tablet, Rfl: 1    Lutein 20 MG CAPS, Take by mouth, Disp: , Rfl:     Polyethyl Glycol-Propyl Glycol (SYSTANE OP), Apply to eye, Disp: , Rfl:     pravastatin (PRAVACHOL) 10 mg tablet, TAKE 5 DAYS PER WEEK, Disp: 180 tablet, Rfl: 1      PHYSICAL EXAMINATION   Physical Exam  Vitals and nursing note reviewed.   Constitutional:       General: She is not in acute distress.     Appearance: Normal appearance. She is well-developed and well-groomed. She is not ill-appearing.   HENT:      Head: Normocephalic.      Right Ear: Tympanic membrane " and ear canal normal.      Left Ear: Tympanic membrane and ear canal normal.      Ears:      Comments: B/L hearing adies  Neck:      Thyroid: No thyromegaly.      Vascular: No carotid bruit.   Cardiovascular:      Rate and Rhythm: Normal rate and regular rhythm.      Pulses:           Posterior tibial pulses are 2+ on the right side and 2+ on the left side.      Heart sounds: Normal heart sounds.   Pulmonary:      Effort: Pulmonary effort is normal. No respiratory distress.      Breath sounds: Normal breath sounds and air entry.   Musculoskeletal:      Right lower leg: No edema.      Left lower leg: No edema.   Lymphadenopathy:      Cervical: No cervical adenopathy.   Skin:     General: Skin is warm and dry.   Neurological:      General: No focal deficit present.      Mental Status: She is alert and oriented to person, place, and time.   Psychiatric:         Attention and Perception: Attention normal.         Mood and Affect: Mood normal.         Behavior: Behavior normal.         Thought Content: Thought content normal.         Judgment: Judgment normal.

## 2024-05-07 ENCOUNTER — OFFICE VISIT (OUTPATIENT)
Dept: PHYSICAL THERAPY | Facility: REHABILITATION | Age: 79
End: 2024-05-07
Payer: MEDICARE

## 2024-05-07 DIAGNOSIS — N81.11 CYSTOCELE, MIDLINE: Primary | ICD-10-CM

## 2024-05-07 DIAGNOSIS — N81.89 PELVIC FLOOR WEAKNESS: ICD-10-CM

## 2024-05-07 DIAGNOSIS — R39.198 DIFFICULTY VOIDING: ICD-10-CM

## 2024-05-07 PROCEDURE — 97110 THERAPEUTIC EXERCISES: CPT | Performed by: PHYSICAL THERAPIST

## 2024-05-07 PROCEDURE — 97112 NEUROMUSCULAR REEDUCATION: CPT | Performed by: PHYSICAL THERAPIST

## 2024-05-07 PROCEDURE — 97530 THERAPEUTIC ACTIVITIES: CPT | Performed by: PHYSICAL THERAPIST

## 2024-05-07 PROCEDURE — 97140 MANUAL THERAPY 1/> REGIONS: CPT | Performed by: PHYSICAL THERAPIST

## 2024-05-07 NOTE — PROGRESS NOTES
Daily Note     Today's date: 2024  Patient name: Kaila Mcgregor  : 1945  MRN: [id number]  Referring provider: Tory De Leon CRNP  Dx:   Encounter Diagnosis     ICD-10-CM    1. Cystocele, midline  N81.11       2. Difficulty voiding  R39.198       3. Pelvic floor weakness  N81.89           Start Time: 1232  Stop Time: 1328  Total time in clinic (min): 56 minutes    Subjective: Pt reports no pelvic pressure but she can still feel her prolapse is present.       Objective: See treatment diary below    Pt provided verbal consent prior to and throughout objective assessment     Tenderness:  Piriformis: neg  Obturator Internus: neg  Adductors: neg  Post pelvic raegan: neg  Hypomobility T4-T7    Lumbar AROM   WNL     SLS  L: good load transfer  R: pos trendelenburg. Fair load transfer    Other Comments:  Able to squat to parallel with good body mechanics   Fair TAC  Good lateral rib expansion with deep breath however fair abdominal expansion    MMT:   Left Right   Hip flex 4+ 4+   Hip ABD 4- 4   Hip ext 4 4-   Hip IR 4+ 4   Hip ER 4 4+   Knee flex 4+ 4+   Knee ext 4+ 4+   Ankle DF 4+ 4+               Special Tests:   Left Right   GRANT -, tight -, tight   FADIR - -   Post Thigh Thrust - -   Pop angle 37 deg 41 deg   Hip IR PROM 21 deg 32 deg   Hip ER PROM 40 deg 22 deg       Assessment: Pt present with b/l hip tightness, proximal hip weakness, and impaired load transfer on R. PT initiated 360 deg breathing with focus on rib/abdominal expansion to be performed during the ay as well as with her stretches for PFM relaxation. PT also initiated LE stretches to improve PFM relaxation as well as increase hip mobility. PT also cued pt on managing IAP with exhale on exertion during ADLs. Tolerated treatment well. Patient would benefit from continued PT      Plan: Continue per plan of care.      Precautions: hx of HTN, bladder suspension surgery  Impairments/functional limitations: prolapse/cystocele, incomplete  "voiding  Daily Treatment Diary    Manuals 4/25 5/7        External assess performed         Internal assess performed         Internal manual therapy Layer 3 coccygeus, iliococcogeus, OI bl         Objective assessment  20 mins        Neuro Re-Ed          TAC          PF Performed, vc/tc, with exhale, with TAC, with ADD daina         TAC+PF          TAC+PF with march          TAC+PF with alt knee fall out          TAC+PF with ball squeeze          TAC+PF with SLR          TAC+PF with s/l ABD          TAC+PF with bridge                              360 deg breathing  Review, issued, 3 breath an hour         Ther Ex                    Thoracic open book stretch (smear honey)  5\"x5 ea        Supine uni butterfly stretch  30\"x2 ea        Supine hip IR/ER AROM  5\"x10 ea        Seated hamstring stretch   30\"x2 ea        Seated happy baby with 360 deg breathing   5\"x5                                                 Ther Activity          Managing IAP/exhale on exertion  Review, with ADL's, lifting, bnding, transfers         The knack          Prolapse lifting/modifications          Gait Training                              Modalities                                        "

## 2024-05-13 ENCOUNTER — OFFICE VISIT (OUTPATIENT)
Dept: PHYSICAL THERAPY | Facility: REHABILITATION | Age: 79
End: 2024-05-13
Payer: MEDICARE

## 2024-05-13 DIAGNOSIS — N81.89 PELVIC FLOOR WEAKNESS: ICD-10-CM

## 2024-05-13 DIAGNOSIS — N81.11 CYSTOCELE, MIDLINE: Primary | ICD-10-CM

## 2024-05-13 DIAGNOSIS — R39.198 DIFFICULTY VOIDING: ICD-10-CM

## 2024-05-13 PROCEDURE — 97112 NEUROMUSCULAR REEDUCATION: CPT | Performed by: PHYSICAL THERAPIST

## 2024-05-13 PROCEDURE — 97110 THERAPEUTIC EXERCISES: CPT | Performed by: PHYSICAL THERAPIST

## 2024-05-13 PROCEDURE — 97530 THERAPEUTIC ACTIVITIES: CPT | Performed by: PHYSICAL THERAPIST

## 2024-05-13 NOTE — PROGRESS NOTES
Daily Note     Today's date: 2024  Patient name: Kaila Mcgregor  : 1945  MRN: [id number]  Referring provider: Tory De Leon CRNP  Dx:   Encounter Diagnosis     ICD-10-CM    1. Cystocele, midline  N81.11       2. Difficulty voiding  R39.198       3. Pelvic floor weakness  N81.89           Start Time: 1529  Stop Time: 1625  Total time in clinic (min): 56 minutes    Subjective: Pt notices some pain in her L>R knee. She is not sure if it is from the exercises. She did play 2 hours of pickle ball today. She also walked yesterday and had some inclines. She also did water aerobics and also mulched. She has no pain during the exercises. Her knee is not bothering her right now but se took naprosyn. Pt has been trying to focus on breathing when she moves/changes position. Pt still is not sure she is emptying all the way but if she does the double void she no longer has a post void-dribble.      Objective: See treatment diary below      Assessment: PT cued pt on lifting modifications for managing prolapse including exhaling, PFM activation, preparing position and managing lifting height, load and frequency. PT reviewed HEP with thoracic stretch and hip rotation with cueing to decrease range of motion to avoid pain. With hip rotation PT cued pt to avoid rotation of lumbar spine. With sidelying thoracic spine stretch PT cued pt to rotate chest versus hyperextending shoulder. PT issued TAC/PFM with exhaling with daily activities as well as during LE strengthening exercise to increase proximal hip strength and offload PFM. Tolerated treatment well. Patient would benefit from continued PT      Plan: Continue per plan of care.     Access Code: [email or web address]  URL: [email or web address]  Date: 2024  Prepared by: Jacklyn Alejandro    Program Notes  1 time: try to stop flow of peeUse a mirror to visulaize upward/inward motion of pelvic floor     Exercises  - Supine Diaphragmatic Breathing  - 3-5 x daily - 5  "reps  - Supine Hip Internal and External Rotation  - 2 x daily - 10 reps - 5-10 secs hold  - Unilateral Supported Supine Butterfly Stretch  - 2 x daily - 2 reps - 30 secs hold  - Sidelying Thoracic Rotation with Open Book  - 3 x daily - 10 reps - 5-10 secs hold  - Seated Hamstring Stretch  - 2 x daily - 2 reps - 30 secs hold  - Seated Happy Baby With Trunk Flexion For Pelvic Relaxation  - 2 x daily - 5-10 reps - 5 secs hold  - Supine Bridge  - 3-4 x weekly - 2 sets - 10 reps - 2-3 secs hold  - Small Range Straight Leg Raise  - 3-4 x weekly - 2 sets - 10 reps     Precautions: hx of HTN, bladder suspension surgery  Impairments/functional,  limitations: prolapse/cystocele, incomplete voiding  Daily Treatment Diary    Manuals 4/25 5/7 5/13       External assess performed         Internal assess performed         Hip IR PROM/post hip mob L   4 mins       Hip ER PROM, hip inf/lat mob R   4 mins       Internal manual therapy Layer 3 coccygeus, iliococcogeus, OI bl         Objective assessment  20 mins        Neuro Re-Ed          TAC   5\"x5        PF Performed, vc/tc, with exhale, with TAC, with ADD daina  x5       TAC+PF   Review, 5\"x3        TAC+PF with SLR   X8 ea       TAC+PF with s/l ABD          TAC+PF with bridge   3\"x8                           360 deg breathing  Review, issued, 3 breath an hour         Ther Ex          Bike   5 mins       Thoracic open book stretch (smear honey)  5\"x5 ea 5\"x3 ea       Supine uni butterfly stretch  30\"x2 ea 30\"x1 ea       Supine hip IR/ER AROM  5\"x10 ea 5\"x3 ea       Seated hamstring stretch   30\"x2 ea HEP       Seated happy baby with 360 deg breathing   5\"x5  HEP                                               Ther Activity          Managing IAP/exhale on exertion  Review, with ADL's, lifting, bnding, transfers  Review,        The vanessa   Review        Prolapse lifting/modifications   review       Gait Training                              Modalities                               "

## 2024-05-20 ENCOUNTER — OFFICE VISIT (OUTPATIENT)
Dept: PHYSICAL THERAPY | Facility: REHABILITATION | Age: 79
End: 2024-05-20
Payer: MEDICARE

## 2024-05-20 DIAGNOSIS — R39.198 DIFFICULTY VOIDING: ICD-10-CM

## 2024-05-20 DIAGNOSIS — N81.89 PELVIC FLOOR WEAKNESS: ICD-10-CM

## 2024-05-20 DIAGNOSIS — N81.11 CYSTOCELE, MIDLINE: Primary | ICD-10-CM

## 2024-05-20 PROCEDURE — 97112 NEUROMUSCULAR REEDUCATION: CPT | Performed by: PHYSICAL THERAPIST

## 2024-05-20 PROCEDURE — 97110 THERAPEUTIC EXERCISES: CPT | Performed by: PHYSICAL THERAPIST

## 2024-05-20 NOTE — PROGRESS NOTES
Daily Note     Today's date: 2024  Patient name: Kaila Mcgregor  : 1945  MRN: [id number]  Referring provider: Tory De Leon CRNP  Dx:   Encounter Diagnosis     ICD-10-CM    1. Cystocele, midline  N81.11       2. Difficulty voiding  R39.198       3. Pelvic floor weakness  N81.89           Start Time: 831  Stop Time: 931  Total time in clinic (min): 60 minutes    Subjective: Pt reports she has no pain. Feels her exercises are good, a little easy. Pt reports she has good success with double voiding with emptying bladder completely. Pt reports she knows her prolapse is still there when she inserts the cream but not limiting her daily activities. She has more awareness of her PFM contractions.       Objective: See treatment diary below      Assessment: Patient performed recumbent bike aerobic exercise to increase blood flow to the area being treated, prepare the muscles for strength training and stretching, improve overall tolerance to activity, and aerobic endurance. PT progressed POC with proximal hip strengthening with addition of 2 table exercises as well as in standing with focus on TAC/PFM contraction and managing IAP with coordination with breathing to manage prolapse. Pt still requires cueing throughout session especially with transfers to exhale during these motions. Pt stands in sway back and PT cued pt on maintaining ribcage over pelvis in sitting and standing with ADL's to disperse pressure more evenly through pelvic floor more evenly and decrease worsening of prolapse. Tolerated treatment well. Patient would benefit from continued PT      Plan: Continue per plan of care.     Access Code: [email or web address]  URL: [email or web address]  Date: 2024  Prepared by: Jacklyn Alejandro    Program Notes  1 time: try to stop flow of peeUse a mirror to visulaize upward/inward motion of pelvic floor     Exercises  - Supine Hip Internal and External Rotation  - 1 x daily - 10 reps - 5-10 secs  "hold  - Unilateral Supported Supine Butterfly Stretch  - 1 x daily - 2 reps - 30 secs hold  - Sidelying Thoracic Rotation with Open Book  - 1 x daily - 10 reps - 5-10 secs hold  - Seated Hamstring Stretch  - 1 x daily - 2 reps - 30 secs hold  - Seated Happy Baby With Trunk Flexion For Pelvic Relaxation  - 1 x daily - 5-10 reps - 5 secs hold  - Supine Bridge  - 3 x weekly - 20 reps - 2-3 secs hold  - Small Range Straight Leg Raise  - 3 x weekly - 20 reps  - Clamshell  - 3 x weekly - 20 reps - 5 secs hold  - Sidelying Hip Abduction  - 3 x weekly - 20 reps  Precautions: hx of HTN, bladder suspension surgery  Impairments/functional,  limitations: prolapse/cystocele, incomplete voiding  Daily Treatment Diary    Manuals 4/25 5/7 5/13 5/20      External assess performed         Internal assess performed         Hip IR PROM/post hip mob L   4 mins nv      Hip ER PROM, hip inf/lat mob R   4 mins nv      Internal manual therapy Layer 3 coccygeus, iliococcogeus, OI bl         Objective assessment  20 mins        Neuro Re-Ed          TAC   5\"x5        PF Performed, vc/tc, with exhale, with TAC, with ADD daina  x5       TAC+PF   Review, 5\"x3  Review, with exercises      TAC+PF with SLR   X8 ea X10 ea      TAC+PF with s/l ABD    x10ea      TAC+PF with bridge   3\"x8 3\"x10       TAC+PF with clamshells    5\"x10 ea      Standing/sitting ribcage over pelvis    Review      SLS    nv                360 deg breathing  Review, issued, 3 breath an hour         Ther Ex          Bike   5 mins 10 mins L1      Thoracic open book stretch (smear honey)  5\"x5 ea 5\"x3 ea       Supine uni butterfly stretch  30\"x2 ea 30\"x1 ea       Supine hip IR/ER AROM  5\"x10 ea 5\"x3 ea       Seated hamstring stretch   30\"x2 ea HEP       Seated happy baby with 360 deg breathing   5\"x5  HEP       Standing hip ABD    nv      Standing hip ext    nv      Standing march    nv                Ther Activity          Managing IAP/exhale on exertion  Review, with ADL's, " lifting, bnding, transfers  Review,        The vanessa   Review        Prolapse lifting/modifications   review       Gait Training                              Modalities

## 2024-05-31 ENCOUNTER — OFFICE VISIT (OUTPATIENT)
Dept: PHYSICAL THERAPY | Facility: REHABILITATION | Age: 79
End: 2024-05-31
Payer: MEDICARE

## 2024-05-31 DIAGNOSIS — N81.89 PELVIC FLOOR WEAKNESS: ICD-10-CM

## 2024-05-31 DIAGNOSIS — N81.11 CYSTOCELE, MIDLINE: Primary | ICD-10-CM

## 2024-05-31 DIAGNOSIS — R39.198 DIFFICULTY VOIDING: ICD-10-CM

## 2024-05-31 PROCEDURE — 97112 NEUROMUSCULAR REEDUCATION: CPT | Performed by: PHYSICAL THERAPIST

## 2024-05-31 PROCEDURE — 97110 THERAPEUTIC EXERCISES: CPT | Performed by: PHYSICAL THERAPIST

## 2024-05-31 PROCEDURE — 97140 MANUAL THERAPY 1/> REGIONS: CPT | Performed by: PHYSICAL THERAPIST

## 2024-05-31 NOTE — PROGRESS NOTES
Daily Note     Today's date: 2024  Patient name: Kaila Mcgregor  : 1945  MRN: [id number]  Referring provider: Tory De Leon CRNP  Dx:   Encounter Diagnosis     ICD-10-CM    1. Cystocele, midline  N81.11       2. Difficulty voiding  R39.198       3. Pelvic floor weakness  N81.89           Start Time: 0800  Stop Time: 0857  Total time in clinic (min): 57 minutes    Subjective: Pt reports she is doing her exercises at home. She has no pain with them. Pt goes to water aerobics 1-2x/week and does similar exercises in the pool.      Objective: See treatment diary below      Assessment: Pt stands in SLS with pos trendelenburg on L and ipsilateral lean and contralateral trunk rot on R. PT initiated standing hip hike to neutral to improve proprioceptive awareness of where her hips are in space and maintain hips level during SLS. Pt requires intermittent UE support to maintain balance in this position. PT initiated standing hip 3 way to strengthen proximal hips and improve stability.  Pt will benefit from re-eval NV to determine progress towards LTG's and continued need for skilled PT. Tolerated treatment well. Patient would benefit from continued PT      Plan: Continue per plan of care.     Access Code: [email or web address]  URL: [email or web address]  Date: 2024  Prepared by: Jacklyn Alejandro    Program Notes  1 time: try to stop flow of peeUse a mirror to visulaize upward/inward motion of pelvic floor     Exercises  - Supine Hip Internal and External Rotation  - 1 x daily - 10 reps - 5-10 secs hold  - Unilateral Supported Supine Butterfly Stretch  - 1 x daily - 2 reps - 30 secs hold  - Sidelying Thoracic Rotation with Open Book  - 1 x daily - 10 reps - 5-10 secs hold  - Seated Hamstring Stretch  - 1 x daily - 2 reps - 30 secs hold  - Seated Happy Baby With Trunk Flexion For Pelvic Relaxation  - 1 x daily - 5-10 reps - 5 secs hold  - Supine Bridge  - 3 x weekly - 20 reps - 2-3 secs hold  - Small  "Range Straight Leg Raise  - 3 x weekly - 20 reps  - Clamshell  - 3 x weekly - 20 reps - 5 secs hold  - Sidelying Hip Abduction  - 3 x weekly - 20 reps  - Single Leg Stance  - 1 x daily - 3 x weekly - 3 reps - 30 secs hold  - Standing Hip Abduction with Counter Support  - 3 x weekly - 2 sets - 10 reps  - Standing Hip Extension with Counter Support  - 3 x weekly - 2 sets - 10 reps  - Standing March with Counter Support  - 3 x weekly - 2 sets - 10 reps    Precautions: hx of HTN, bladder suspension surgery  Impairments/functional,  limitations: prolapse/cystocele, incomplete voiding  Daily Treatment Diary    Manuals 4/25 5/7 5/13 5/20 5/31     External assess performed         Internal assess performed         Hip IR PROM/post hip mob L   4 mins nv 4 mins     Hip ER PROM, hip inf/lat mob R   4 mins nv 4 mns     Internal manual therapy Layer 3 coccygeus, iliococcogeus, OI bl         Objective assessment  20 mins        Neuro Re-Ed          TAC   5\"x5        PF Performed, vc/tc, with exhale, with TAC, with ADD sadie  x5       TAC+PF   Review, 5\"x3  Review, with exercises      TAC+PF with SLR   X8 ea X10 ea HEP     TAC+PF with s/l ABD    x10ea HEP     TAC+PF with bridge   3\"x8 3\"x10  HEP     TAC+PF with clamshells    5\"x10 ea HEP     Standing/sitting ribcage over pelvis    Review review     SLS    nv 30\"x3 ea     Hip hike to neutral     X10 ea     360 deg breathing  Review, issued, 3 breath an hour         Ther Ex          Bike   5 mins 10 mins L1 10 mins L1      Thoracic open book stretch (smear honey)  5\"x5 ea 5\"x3 ea       Supine uni butterfly stretch  30\"x2 ea 30\"x1 ea       Supine hip IR/ER AROM  5\"x10 ea 5\"x3 ea       Seated hamstring stretch   30\"x2 ea HEP       Seated happy baby with 360 deg breathing   5\"x5  HEP       Standing hip ABD    nv X10 ea     Standing hip ext    nv X10 ea     Standing march    nv X10 ea     Wall hip ABD/ER Sadie          Ther Activity          Managing IAP/exhale on exertion  Review, with " ADL's, lifting, bnding, transfers  Review,        The vanessa   Review        Prolapse lifting/modifications   review       Gait Training                              Modalities

## 2024-06-04 ENCOUNTER — APPOINTMENT (OUTPATIENT)
Dept: PHYSICAL THERAPY | Facility: REHABILITATION | Age: 79
End: 2024-06-04
Payer: MEDICARE

## 2024-06-10 ENCOUNTER — EVALUATION (OUTPATIENT)
Dept: PHYSICAL THERAPY | Facility: REHABILITATION | Age: 79
End: 2024-06-10
Payer: MEDICARE

## 2024-06-10 DIAGNOSIS — N81.89 PELVIC FLOOR WEAKNESS: ICD-10-CM

## 2024-06-10 DIAGNOSIS — N81.11 CYSTOCELE, MIDLINE: Primary | ICD-10-CM

## 2024-06-10 DIAGNOSIS — R39.198 DIFFICULTY VOIDING: ICD-10-CM

## 2024-06-10 PROCEDURE — 97112 NEUROMUSCULAR REEDUCATION: CPT | Performed by: PHYSICAL THERAPIST

## 2024-06-10 PROCEDURE — 97140 MANUAL THERAPY 1/> REGIONS: CPT | Performed by: PHYSICAL THERAPIST

## 2024-06-10 PROCEDURE — 97110 THERAPEUTIC EXERCISES: CPT | Performed by: PHYSICAL THERAPIST

## 2024-06-10 NOTE — PROGRESS NOTES
"PT Re-Evaluation  and PT Discharge    Today's date: 6/10/2024  Patient name: Kaila Mcgregor  : 1945  MRN: [id number]  Referring provider: Tory De Leon CRNP  Dx:   Encounter Diagnosis     ICD-10-CM    1. Cystocele, midline  N81.11       2. Difficulty voiding  R39.198       3. Pelvic floor weakness  N81.89           Start Time: 832  Stop Time: 926  Total time in clinic (min): 54 minutes    Assessment  Impairments: abnormal muscle tone    Assessment details: Patient is a 78 y.o. female who presents to physical therapy with diagnosis of cystocele.   Pt is independent with self management strategies to manage prolapse, independent with HEP, and is appropriate for d/c from skilled PT. Pt is in agreement with this POC.   Understanding of Dx/Px/POC: good     Prognosis: good    Goals  Short term goals (to be met by 4 weeks)    -Pt will be able to perform PFM contraction with good coordination and breathing - MET  -Pt will verbalize understanding of prolapse lifting and exercises modifications  - MET     Long Term Goals (to be met by discharge    -Pt will be independent with HEP - MET   -Pt will verbalize understanding of position/posture for improved voiding - MET            Plan  Patient would benefit from: skilled physical therapy  Planned modality interventions: biofeedback, cryotherapy and thermotherapy: hydrocollator packs    Planned therapy interventions: IASTM, joint mobilization, manual therapy, neuromuscular re-education, therapeutic activities, therapeutic exercise, stretching, strengthening, flexibility, functional ROM exercises, home exercise program, abdominal trunk stabilization and balance    Treatment plan discussed with: patient  Plan details: Pt d/c'ed from skilled PT      PT Pelvic Floor Subjective:   History of Present Illness:   Pt comes to PT with dx of Cystocele.     Pt reports she is unable to rate her improvement. She reports \"I do not feel much difference\"     Pt feels her prolapse " "descent is about the same   Pt uses double void to empty her bladder all the way and feels it is successful  Pt inserts Estradiol cream and when she inserts it she can feel where her prolpase is       Social Support:     Lives in:  One-story house    Lives with:  Alone    Work status: unemployed (volunteer work)    Life stress severity: mild    History of Depression: no  Diet and Exercise:    Diet:balanced nutrition    Pickelball 2x.week  Water aerobics 2x/week  Walking/hiking   No worsening of symptoms with these activities   OB/ gyn History    Gestational History:     Prior Pregnancy: Yes      Number of prior pregnancies: 3    Number of term pregnancies: 2    Delivery Type: vaginal delivery      Menstrual History:      Menopausal: menopause  Bladder Function:     Voiding Difficulties negative for: urgency, frequent urination, hesitancy, straining and incomplete emptying (double void utilized)       Voiding Difficulties comments:     Voiding frequency: every 3-4 hours    Urinary leakage: no urine leakage    Urinary leakage not aggravated by: post-void dribble    Nocturia (episodes per night): 1    Painful urination: No      Intake (ounces): Water: 32, Coffee: 8, Tea: 8,     Intake (ounces) comment: Pt looks at her pee and it is yellow but not dark yellow  She eats lots of fruits and purees food    If she eats more salt she needs to void more   Bowel Function:     Bowel Function comments:  Pt was having problems with looser stools but took a probiotic which helped     Bowel frequency: daily and multiple times a day    [place] Stool Scale: type 4    Stool softener use: no stool softeners    Enema use: no enema    Uses \"squatty potty\": no Squatty Potty  Sexual Function:     Sexually Active:  Sexually active    Pain during intercourse: No    Pain:     No pain reported by patient.  Patient Goals:     Other patient goals:  That her prolpase does not progress any further and not have any surgery - " progressing      Objective    Pt provided verbal consent prior to and throughout objective assessment      Tenderness:  Hypomobility T4-T7     SLS  L: good load transfer  R: good load transfer     Other Comments:  Able to squat to parallel with good body mechanics   Good TAC  Good lateral rib expansion and abdominal expansion with deep breath     MMT:    Left Right   Hip flex 4+ 4+   Hip ABD 4 4+   Hip ext 4+ 4   Hip IR 4+ 4+   Hip ER 4+ 4+   Knee flex 4+ 4+   Knee ext 4+ 4+   Ankle DF 4+ 4+                      Special Tests:    Left Right   GRANT - -, tight   FADIR - -   Post Thigh Thrust - -   Pop angle 33 deg 33 deg   Hip IR PROM 31 deg 32 deg   Hip ER PROM 51 deg 37 deg      Access Code: [email or web address]  URL: [email or web address]  Date: 06/10/2024  Prepared by: Jacklyn Alejandro    Program Notes  1 time: try to stop flow of peeUse a mirror to visulaize upward/inward motion of pelvic floor     Exercises  - Supine Hip Internal and External Rotation  - 1 x daily - 10 reps - 5-10 secs hold  - Unilateral Supported Supine Butterfly Stretch  - 1 x daily - 2 reps - 30 secs hold  - Sidelying Thoracic Rotation with Open Book  - 1 x daily - 10 reps - 5-10 secs hold  - Seated Hamstring Stretch  - 1 x daily - 2 reps - 30 secs hold  - Seated Happy Baby With Trunk Flexion For Pelvic Relaxation  - 1 x daily - 5-10 reps - 5 secs hold  - Sidelying Hip Abduction  - 3 x weekly - 20 reps  - Single Leg Stance  - 1 x daily - 3 x weekly - 3 reps - 30 secs hold  - Single Leg Balance with Clock Reach  - 3 x weekly - 10 reps  - Standing Hip Abduction with Counter Support  - 3 x weekly - 2 sets - 10 reps  - Standing Hip Extension with Counter Support  - 3 x weekly - 2 sets - 10 reps  - Standing March with Counter Support  - 3 x weekly - 2 sets - 10 reps  - Isometric Gluteus Medius at Wall  - 3 x weekly - 10 reps - 5-10 secs hold           Precautions: hx of HTN, bladder suspension surgery  Impairments/functional,  limitations:  "prolapse/cystocele, incomplete voiding  Daily Treatment Diary    Manuals 4/25 5/7 5/13 5/20 5/31 6/10    External assess performed         Internal assess performed         Hip IR PROM/post hip mob L   4 mins nv 4 mins     Hip ER PROM, hip inf/lat mob R   4 mins nv 4 mns     Internal manual therapy Layer 3 coccygeus, iliococcogeus, OI bl         Objective assessment  20 mins        Re-eval      30 mins    Neuro Re-Ed          TAC   5\"x5        PF Performed, vc/tc, with exhale, with TAC, with ADD sadie  x5       TAC+PF   Review, 5\"x3  Review, with exercises      TAC+PF with SLR   X8 ea X10 ea HEP D/c    TAC+PF with s/l ABD    x10ea HEP X10 ea    TAC+PF with bridge   3\"x8 3\"x10  HEP D/c    TAC+PF with clamshells    5\"x10 ea HEP D/c    Standing/sitting ribcage over pelvis    Review review     SLS    nv 30\"x3 ea Review, 30\"x2 ea    3 way tap       X5 ea     Hip hike to neutral     X10 ea np    360 deg breathing  Review, issued, 3 breath an hour         Ther Ex          Bike   5 mins 10 mins L1 10 mins L1  np    Thoracic open book stretch (smear honey)  5\"x5 ea 5\"x3 ea   HEP    Supine uni butterfly stretch  30\"x2 ea 30\"x1 ea   HEP    Supine hip IR/ER AROM  5\"x10 ea 5\"x3 ea   HEP    Seated hamstring stretch   30\"x2 ea HEP   HEP    Seated happy baby with 360 deg breathing   5\"x5  HEP   HEP    Standing hip ABD    nv X10 ea X10 ea    Standing hip ext    nv X10 ea X10 ea    Standing march    nv X10 ea X10 ea    Wall hip ABD/ER Sadie      5\"x5 ea    Ther Activity          Managing IAP/exhale on exertion  Review, with ADL's, lifting, bnding, transfers  Review,        The vanessa   Review        Prolapse lifting/modifications   review       Gait Training                              Modalities                                            "

## 2024-07-30 ENCOUNTER — HOSPITAL ENCOUNTER (OUTPATIENT)
Dept: MAMMOGRAPHY | Facility: MEDICAL CENTER | Age: 79
Discharge: HOME | End: 2024-07-30
Payer: MEDICARE

## 2024-07-30 ENCOUNTER — HOSPITAL ENCOUNTER (OUTPATIENT)
Dept: BONE DENSITY | Facility: MEDICAL CENTER | Age: 79
Discharge: HOME | End: 2024-07-30
Payer: MEDICARE

## 2024-07-30 VITALS — BODY MASS INDEX: 20.98 KG/M2 | HEIGHT: 62 IN | WEIGHT: 114 LBS

## 2024-07-30 DIAGNOSIS — M85.80 OSTEOPENIA, UNSPECIFIED LOCATION: ICD-10-CM

## 2024-07-30 DIAGNOSIS — Z12.31 ENCOUNTER FOR SCREENING MAMMOGRAM FOR BREAST CANCER: ICD-10-CM

## 2024-07-30 DIAGNOSIS — Z78.0 POST-MENOPAUSAL: ICD-10-CM

## 2024-07-30 PROCEDURE — 77063 BREAST TOMOSYNTHESIS BI: CPT

## 2024-07-30 PROCEDURE — 77080 DXA BONE DENSITY AXIAL: CPT

## 2024-07-30 PROCEDURE — 77067 SCR MAMMO BI INCL CAD: CPT

## 2024-08-13 NOTE — PATIENT INSTRUCTIONS
Rehab Group    Start time: 1120  End time: 1220  Patient time total: 40 minutes    attended partial group    #5 attended   Group Type: dance movement   Group Topic Covered: dance therapy       Group Session Detail:  ts explored binaries and balance in music and movement using both rhythm for organization and to find synchrony, but also attunement to peers to find balance between opposites with in the group. By using various divisions (ie: chapter one and chapter two, this side of the group room and that side, external processing and internal processing, slow expression or quick, etc) pts found both personal expression and met others to form bridges across these types of divisions.     Patient Response/Contribution:  cooperative with task and listened actively       Patient Detail:    Pt was both quiet but with a strong and self-secure presence.  She was able to provide a subtle, but socially appropriate support to a peer that she expressed pride in.  She also shared gratitude about the session.      Group Therapy [id number]      Patient Active Problem List   Diagnosis    Anxiety    Major depression    Suicidal ideation       Anti-viral not recommended at this time as you have stated that you gauge this as mild and you have had sinus infections worse than this and also the concern for side effects from Antivirals, the risk vs benefits is not clearly going in the direction of taking Antivirals.   Take Robitussin as needed for cough.  Isolate until 1/4/2024 and then can be around others with mask on for 5 more days from then.    Proceed to ER if symptoms worsen.

## 2024-08-28 ENCOUNTER — PATIENT MESSAGE (OUTPATIENT)
Dept: FAMILY MEDICINE CLINIC | Facility: CLINIC | Age: 79
End: 2024-08-28

## 2024-08-30 DIAGNOSIS — L60.8 NAIL DEFORMITY: Primary | ICD-10-CM

## 2024-09-08 DIAGNOSIS — L60.9 NAIL ABNORMALITY: Primary | ICD-10-CM

## 2024-09-08 DIAGNOSIS — Z13.21 ENCOUNTER FOR VITAMIN DEFICIENCY SCREENING: ICD-10-CM

## 2024-09-08 DIAGNOSIS — Z13.0 SCREENING, ANEMIA, DEFICIENCY, IRON: ICD-10-CM

## 2024-09-10 DIAGNOSIS — L60.9 NAIL ABNORMALITY: Primary | ICD-10-CM

## 2024-09-10 DIAGNOSIS — Z13.0 SCREENING, ANEMIA, DEFICIENCY, IRON: ICD-10-CM

## 2024-09-10 DIAGNOSIS — Z13.21 ENCOUNTER FOR VITAMIN DEFICIENCY SCREENING: ICD-10-CM

## 2024-09-10 NOTE — PATIENT COMMUNICATION
Lvm notifying pt that she must sign this acknowledgement of cost before bloodwork can be ordered. I put form in front  bin if pt would like to proceed with this. Ángela cannot order labs until this paper is signed.    If pt is agreeable and signs please scan signed paper into media and notify Ángela so she can place lab orders.

## 2024-09-19 DIAGNOSIS — I10 ESSENTIAL HYPERTENSION: ICD-10-CM

## 2024-09-19 RX ORDER — LISINOPRIL 2.5 MG/1
2.5 TABLET ORAL DAILY
Qty: 90 TABLET | Refills: 1 | Status: SHIPPED | OUTPATIENT
Start: 2024-09-19

## 2024-09-23 ENCOUNTER — OFFICE VISIT (OUTPATIENT)
Dept: FAMILY MEDICINE CLINIC | Facility: CLINIC | Age: 79
End: 2024-09-23
Payer: MEDICARE

## 2024-09-23 VITALS
WEIGHT: 118.4 LBS | SYSTOLIC BLOOD PRESSURE: 132 MMHG | DIASTOLIC BLOOD PRESSURE: 76 MMHG | BODY MASS INDEX: 22.01 KG/M2 | HEART RATE: 79 BPM | TEMPERATURE: 98 F | OXYGEN SATURATION: 98 %

## 2024-09-23 DIAGNOSIS — H92.02 LEFT EAR PAIN: Primary | ICD-10-CM

## 2024-09-23 DIAGNOSIS — G47.9 SLEEP DISORDER: ICD-10-CM

## 2024-09-23 PROCEDURE — G2211 COMPLEX E/M VISIT ADD ON: HCPCS | Performed by: FAMILY MEDICINE

## 2024-09-23 PROCEDURE — 99213 OFFICE O/P EST LOW 20 MIN: CPT | Performed by: FAMILY MEDICINE

## 2024-09-23 NOTE — PROGRESS NOTES
Ambulatory Visit  Name: Kaila Mcgregor      : 1945      MRN: [id number]  Encounter Provider: Colin Erickson DO  Encounter Date: 2024   Encounter department: [facility]    Assessment & Plan  Left ear pain  Left ear pain mostly resolved.  Physical exam unremarkable.  Recommend she continue with her Flonase nasal spray.  May want to add oral antihistamine given that her allergies do flareup this time of year.       Sleep disorder  Some issues with waking in the middle of the night and having a difficult time getting back to sleep.  She has no problem with initiation of sleep at the beginning of the night.  Discussed options such as melatonin or Tylenol PM.  Would not recommend prescription sleep aid at this time.            History of Present Illness     Patient complains of left ear pain last week.  Gradually improved and is 90% resolved at this time but would like to have it checked as she is traveling later this week.      Review of Systems   Constitutional: Negative.    HENT:  Positive for ear pain.    Respiratory: Negative.     Cardiovascular: Negative.    Gastrointestinal: Negative.    Genitourinary: Negative.    Musculoskeletal: Negative.    Psychiatric/Behavioral: Negative.       Past Medical History:   Diagnosis Date    Allergic 35 yrs ago    Arthritis     Chronic kidney disease     HL (hearing loss) wear HA's    Hyperlipidemia     Hypertension      Past Surgical History:   Procedure Laterality Date    BLADDER SURGERY      LAST ASSESSED [id number]    DILATION AND CURETTAGE OF UTERUS      LAST ASSESSED [id number]    EYE SURGERY  cataracts        TOOTH EXTRACTION      LAST ASSESSED [id number]     Family History   Problem Relation Age of Onset    Dementia Mother     Hypertension Mother     Skin cancer Mother         age unknown    Hearing loss Mother     Vision loss Mother     No Known Problems Father     No Known Problems Daughter     Depression Daughter     No Known  Problems Maternal Grandmother     No Known Problems Maternal Grandfather     No Known Problems Paternal Grandmother     No Known Problems Paternal Grandfather     No Known Problems Paternal Aunt      Social History     Tobacco Use    Smoking status: Never     Passive exposure: Never    Smokeless tobacco: Never   Vaping Use    Vaping status: Never Used   Substance and Sexual Activity    Alcohol use: Yes     Alcohol/week: 5.0 standard drinks of alcohol     Types: 3 Glasses of wine, 2 Standard drinks or equivalent per week     Comment: social     Drug use: No    Sexual activity: Yes     Partners: Male     Birth control/protection: Post-menopausal     Current Outpatient Medications on File Prior to Visit   Medication Sig    Calcium Carbonate-Vitamin D3 600-400 MG-UNIT TABS Take by mouth    Cholecalciferol 25 MCG (1000 UT) tablet Take 1,000 Units by mouth daily    estradiol (ESTRACE) 0.1 mg/g vaginal cream Insert 0.5 g into the vagina 2 (two) times a week    ibuprofen (MOTRIN) 200 mg tablet Take 200 mg by mouth if needed    lisinopril (ZESTRIL) 2.5 mg tablet Take 1 tablet (2.5 mg total) by mouth daily    Lutein 20 MG CAPS Take by mouth    Polyethyl Glycol-Propyl Glycol (SYSTANE OP) Apply to eye    pravastatin (PRAVACHOL) 10 mg tablet TAKE 5 DAYS PER WEEK     Allergies   Allergen Reactions    Shellfish Allergy - Food Allergy GI Intolerance     Mollusks/clams/oysters    Erythromycin Rash     Immunization History   Administered Date(s) Administered    COVID-19 PFIZER VACCINE 0.3 ML IM 01/19/2021, 02/09/2021, 10/04/2021    COVID-19 Pfizer Vac BIVALENT Celso-sucrose 12 Yr+ IM 12/05/2022    COVID-19 Pfizer mRNA vacc PF celso-sucrose 12 yr and older (Comirnaty) 11/29/2023    COVID-19 Pfizer vac (Celso-sucrose, gray cap) 12 yr+ IM 06/06/2022    INFLUENZA 10/12/2014, 09/12/2023    Influenza Split High Dose Preservative Free IM 10/12/2014, 10/13/2017    Influenza, high dose seasonal 0.7 mL 10/04/2018, 10/15/2019, 10/21/2020,  10/26/2021, 10/26/2022    Pneumococcal Conjugate 13-Valent 01/21/2018    Pneumococcal Polysaccharide PPV23 04/12/2010    Tdap 10/15/2018    Zoster 04/12/2011    Zoster Vaccine Recombinant 04/12/2011, 01/25/2023, 07/03/2023     Objective     /76 (BP Location: Right arm, Patient Position: Sitting, Cuff Size: Adult)   Pulse 79   Temp 98 °F (36.7 °C)   Wt 53.7 kg (118 lb 6.4 oz)   SpO2 98%   BMI 22.01 kg/m²     Physical Exam  Vitals and nursing note reviewed.   Constitutional:       General: She is not in acute distress.     Appearance: Normal appearance.   HENT:      Head: Normocephalic and atraumatic.   Eyes:      Pupils: Pupils are equal, round, and reactive to light.   Cardiovascular:      Rate and Rhythm: Normal rate and regular rhythm.      Pulses: Normal pulses.      Heart sounds: Normal heart sounds.   Pulmonary:      Effort: Pulmonary effort is normal.      Breath sounds: Normal breath sounds.   Musculoskeletal:         General: Normal range of motion.      Cervical back: Normal range of motion.   Skin:     General: Skin is warm and dry.   Neurological:      General: No focal deficit present.      Mental Status: She is alert and oriented to person, place, and time. Mental status is at baseline.   Psychiatric:         Mood and Affect: Mood normal.         Behavior: Behavior normal.         Thought Content: Thought content normal.         Judgment: Judgment normal.

## 2024-10-23 ENCOUNTER — APPOINTMENT (OUTPATIENT)
Dept: LAB | Facility: CLINIC | Age: 79
End: 2024-10-23
Payer: MEDICARE

## 2024-10-23 DIAGNOSIS — Z13.29 SCREENING FOR THYROID DISORDER: ICD-10-CM

## 2024-10-23 DIAGNOSIS — N18.31 STAGE 3A CHRONIC KIDNEY DISEASE (HCC): ICD-10-CM

## 2024-10-23 DIAGNOSIS — I10 ESSENTIAL HYPERTENSION: ICD-10-CM

## 2024-10-23 DIAGNOSIS — Z13.0 SCREENING, ANEMIA, DEFICIENCY, IRON: ICD-10-CM

## 2024-10-23 DIAGNOSIS — E78.5 HYPERLIPIDEMIA, UNSPECIFIED HYPERLIPIDEMIA TYPE: ICD-10-CM

## 2024-10-23 LAB
ALBUMIN SERPL BCG-MCNC: 4.1 G/DL (ref 3.5–5)
ALP SERPL-CCNC: 59 U/L (ref 34–104)
ALT SERPL W P-5'-P-CCNC: 15 U/L (ref 7–52)
ANION GAP SERPL CALCULATED.3IONS-SCNC: 9 MMOL/L (ref 4–13)
AST SERPL W P-5'-P-CCNC: 19 U/L (ref 13–39)
BASOPHILS # BLD AUTO: 0.07 THOUSANDS/ΜL (ref 0–0.1)
BASOPHILS NFR BLD AUTO: 2 % (ref 0–1)
BILIRUB SERPL-MCNC: 1.31 MG/DL (ref 0.2–1)
BUN SERPL-MCNC: 22 MG/DL (ref 5–25)
CALCIUM SERPL-MCNC: 9.3 MG/DL (ref 8.4–10.2)
CHLORIDE SERPL-SCNC: 104 MMOL/L (ref 96–108)
CHOLEST SERPL-MCNC: 213 MG/DL
CO2 SERPL-SCNC: 29 MMOL/L (ref 21–32)
CREAT SERPL-MCNC: 0.86 MG/DL (ref 0.6–1.3)
EOSINOPHIL # BLD AUTO: 0.38 THOUSAND/ΜL (ref 0–0.61)
EOSINOPHIL NFR BLD AUTO: 8 % (ref 0–6)
ERYTHROCYTE [DISTWIDTH] IN BLOOD BY AUTOMATED COUNT: 12.6 % (ref 11.6–15.1)
GFR SERPL CREATININE-BSD FRML MDRD: 64 ML/MIN/1.73SQ M
GLUCOSE P FAST SERPL-MCNC: 83 MG/DL (ref 65–99)
HCT VFR BLD AUTO: 40.5 % (ref 34.8–46.1)
HDLC SERPL-MCNC: 75 MG/DL
HGB BLD-MCNC: 12.8 G/DL (ref 11.5–15.4)
IMM GRANULOCYTES # BLD AUTO: 0.02 THOUSAND/UL (ref 0–0.2)
IMM GRANULOCYTES NFR BLD AUTO: 0 % (ref 0–2)
LDLC SERPL CALC-MCNC: 125 MG/DL (ref 0–100)
LYMPHOCYTES # BLD AUTO: 1 THOUSANDS/ΜL (ref 0.6–4.47)
LYMPHOCYTES NFR BLD AUTO: 22 % (ref 14–44)
MCH RBC QN AUTO: 30.5 PG (ref 26.8–34.3)
MCHC RBC AUTO-ENTMCNC: 31.6 G/DL (ref 31.4–37.4)
MCV RBC AUTO: 96 FL (ref 82–98)
MONOCYTES # BLD AUTO: 0.35 THOUSAND/ΜL (ref 0.17–1.22)
MONOCYTES NFR BLD AUTO: 8 % (ref 4–12)
NEUTROPHILS # BLD AUTO: 2.72 THOUSANDS/ΜL (ref 1.85–7.62)
NEUTS SEG NFR BLD AUTO: 60 % (ref 43–75)
NONHDLC SERPL-MCNC: 138 MG/DL
NRBC BLD AUTO-RTO: 0 /100 WBCS
PLATELET # BLD AUTO: 175 THOUSANDS/UL (ref 149–390)
PMV BLD AUTO: 12.9 FL (ref 8.9–12.7)
POTASSIUM SERPL-SCNC: 3.9 MMOL/L (ref 3.5–5.3)
PROT SERPL-MCNC: 7.2 G/DL (ref 6.4–8.4)
RBC # BLD AUTO: 4.2 MILLION/UL (ref 3.81–5.12)
SODIUM SERPL-SCNC: 142 MMOL/L (ref 135–147)
TRIGL SERPL-MCNC: 66 MG/DL
TSH SERPL DL<=0.05 MIU/L-ACNC: 2.14 UIU/ML (ref 0.45–4.5)
WBC # BLD AUTO: 4.54 THOUSAND/UL (ref 4.31–10.16)

## 2024-10-23 PROCEDURE — 85025 COMPLETE CBC W/AUTO DIFF WBC: CPT

## 2024-10-23 PROCEDURE — 36415 COLL VENOUS BLD VENIPUNCTURE: CPT

## 2024-10-23 PROCEDURE — 84443 ASSAY THYROID STIM HORMONE: CPT

## 2024-10-23 PROCEDURE — 80061 LIPID PANEL: CPT

## 2024-10-23 PROCEDURE — 80053 COMPREHEN METABOLIC PANEL: CPT

## 2024-11-04 ENCOUNTER — OFFICE VISIT (OUTPATIENT)
Dept: FAMILY MEDICINE CLINIC | Facility: CLINIC | Age: 79
End: 2024-11-04
Payer: MEDICARE

## 2024-11-04 VITALS
HEART RATE: 72 BPM | BODY MASS INDEX: 22.13 KG/M2 | HEIGHT: 61 IN | TEMPERATURE: 97.8 F | WEIGHT: 117.2 LBS | DIASTOLIC BLOOD PRESSURE: 72 MMHG | SYSTOLIC BLOOD PRESSURE: 114 MMHG | OXYGEN SATURATION: 98 %

## 2024-11-04 DIAGNOSIS — I10 ESSENTIAL HYPERTENSION: ICD-10-CM

## 2024-11-04 DIAGNOSIS — Z23 ENCOUNTER FOR IMMUNIZATION: ICD-10-CM

## 2024-11-04 DIAGNOSIS — Z23 NEED FOR COVID-19 VACCINE: ICD-10-CM

## 2024-11-04 DIAGNOSIS — Z00.00 MEDICARE ANNUAL WELLNESS VISIT, SUBSEQUENT: Primary | ICD-10-CM

## 2024-11-04 DIAGNOSIS — E78.5 HYPERLIPIDEMIA, UNSPECIFIED HYPERLIPIDEMIA TYPE: ICD-10-CM

## 2024-11-04 DIAGNOSIS — L60.8 NAIL DEFORMITY: ICD-10-CM

## 2024-11-04 PROCEDURE — 99214 OFFICE O/P EST MOD 30 MIN: CPT | Performed by: NURSE PRACTITIONER

## 2024-11-04 PROCEDURE — G0439 PPPS, SUBSEQ VISIT: HCPCS | Performed by: NURSE PRACTITIONER

## 2024-11-04 PROCEDURE — 90480 ADMN SARSCOV2 VAC 1/ONLY CMP: CPT | Performed by: NURSE PRACTITIONER

## 2024-11-04 PROCEDURE — G0008 ADMIN INFLUENZA VIRUS VAC: HCPCS | Performed by: NURSE PRACTITIONER

## 2024-11-04 PROCEDURE — 91320 SARSCV2 VAC 30MCG TRS-SUC IM: CPT | Performed by: NURSE PRACTITIONER

## 2024-11-04 PROCEDURE — 90662 IIV NO PRSV INCREASED AG IM: CPT | Performed by: NURSE PRACTITIONER

## 2024-11-04 RX ORDER — FLUTICASONE PROPIONATE 50 MCG
1 SPRAY, SUSPENSION (ML) NASAL AS NEEDED
COMMUNITY

## 2024-11-04 NOTE — ASSESSMENT & PLAN NOTE
Stable: 114/72  Patient asymptomatic  Advised periodic home blood pressures  Continue low-dose lisinopril 2.5  Continue monitoring kidney function as below  F/U 6 month  Return to care sooner with any concerns    Orders:    Comprehensive metabolic panel; Future

## 2024-11-04 NOTE — PROGRESS NOTES
Ambulatory Visit  Name: Kaila Mcgregor      : 1945      MRN: [id number]  Encounter Provider: TAHMINA Ba  Encounter Date: 2024   Encounter department: [facility]    Assessment & Plan  Need for COVID-19 vaccine    Orders:    COVID-19 Pfizer mRNA vaccine 12 yr and older (Comirnaty pre-filled syringe)    Encounter for immunization    Orders:    influenza vaccine, high-dose, PF 0.5 mL (Fluzone High Dose)    Medicare annual wellness visit, subsequent         Nail deformity  Will check xray today - r/u anomaly  Could check vitamins - pt declines today  Recommend Derm eval  again - pt declines  Will monitor    Orders:    XR finger right third digit-middle; Future    Essential hypertension  Stable: 114/72  Patient asymptomatic  Advised periodic home blood pressures  Continue low-dose lisinopril 2.5  Continue monitoring kidney function as below  F/U 6 month  Return to care sooner with any concerns    Orders:    Comprehensive metabolic panel; Future    Hyperlipidemia, unspecified hyperlipidemia type  Slight increase  Total 213;   Currently taking pravastatin 5 days/week  She will increase to daily with dinner  Recheck lipid panel again in 3 months      Orders:    Lipid panel; Future      Depression Screening and Follow-up Plan: Patient was screened for depression during today's encounter. They screened negative with a PHQ-2 score of 0.    Urinary Incontinence Plan of Care: counseling topics discussed: practice Kegel (pelvic floor strengthening) exercises and preventing constipation.       Preventive health issues were discussed with patient, and age appropriate screening tests were ordered as noted in patient's After Visit Summary. Personalized health advice and appropriate referrals for health education or preventive services given if needed, as noted in patient's After Visit Summary.    History of Present Illness     Medicare wellness exam  No new healthcare  concerns  PCP managing hypertension, hyperlipidemia       Patient Care Team:  TAHMINA Ba as PCP - General (Family Medicine)  DO Alexander Larose DPM (Podiatry)    Review of Systems   Constitutional: Negative.    HENT: Negative.     Respiratory: Negative.     Cardiovascular: Negative.    Gastrointestinal: Negative.    Genitourinary: Negative.    Musculoskeletal: Negative.    Skin: Negative.    Neurological: Negative.    Psychiatric/Behavioral: Negative.       Medical History Reviewed by provider this encounter:  Tobacco  Allergies  Meds  Problems  Med Hx  Surg Hx  Fam Hx       Annual Wellness Visit Questionnaire   Kaila is here for her Subsequent Wellness visit. Last Medicare Wellness visit information reviewed, patient interviewed, no change since last AWV.     Health Risk Assessment:   Patient rates overall health as very good. Patient feels that their physical health rating is same. Patient is satisfied with their life. Eyesight was rated as same. Hearing was rated as same. Patient feels that their emotional and mental health rating is same. Patients states they are sometimes angry. Patient states they are never, rarely unusually tired/fatigued. Pain experienced in the last 7 days has been some. Patient's pain rating has been 3/10. Patient states that she has experienced no weight loss or gain in last 6 months.     Depression Screening:   PHQ-2 Score: 0      Fall Risk Screening:   In the past year, patient has experienced: no history of falling in past year      Urinary Incontinence Screening:   Patient has leaked urine accidently in the last six months.     Home Safety:  Patient does not have trouble with stairs inside or outside of their home. Patient has working smoke alarms and has working carbon monoxide detector. Home safety hazards include: none.     Nutrition:   Current diet is Regular.     Medications:   Patient is currently taking over-the-counter supplements.  OTC medications include: see medication list. Patient is able to manage medications.     Activities of Daily Living (ADLs)/Instrumental Activities of Daily Living (IADLs):   Walk and transfer into and out of bed and chair?: Yes  Dress and groom yourself?: Yes    Bathe or shower yourself?: Yes    Feed yourself? Yes  Do your laundry/housekeeping?: Yes  Manage your money, pay your bills and track your expenses?: Yes  Make your own meals?: Yes    Do your own shopping?: Yes    Previous Hospitalizations:   Any hospitalizations or ED visits within the last 12 months?: No      Advance Care Planning:   Living will: Yes    Durable POA for healthcare: Yes    Advanced directive: Yes    Five wishes given: No      Cognitive Screening:   Provider or family/friend/caregiver concerned regarding cognition?: No    PREVENTIVE SCREENINGS      Cardiovascular Screening:    General: Screening Not Indicated and History Lipid Disorder      Diabetes Screening:     General: Screening Current      Colorectal Cancer Screening:     General: Screening Current      Breast Cancer Screening:     General: Screening Current      Cervical Cancer Screening:    General: Screening Not Indicated      Osteoporosis Screening:    General: Screening Current      Abdominal Aortic Aneurysm (AAA) Screening:        General: Screening Not Indicated      Lung Cancer Screening:     General: Screening Not Indicated      Hepatitis C Screening:    General: Screening Current    Screening, Brief Intervention, and Referral to Treatment (SBIRT)    Screening    Typical number of drinks in a week: 5    Single Item Drug Screening:  How often have you used an illegal drug (including marijuana) or a prescription medication for non-medical reasons in the past year? never    Single Item Drug Screen Score: 0  Interpretation: Negative screen for possible drug use disorder    Brief Intervention      Repeat lab work ordered  Immunizations updated today    Other Counseling Topics:  "  Car/seat belt/driving safety, skin self-exam, sunscreen and calcium and vitamin D intake and regular weightbearing exercise.     Social Determinants of Health     Financial Resource Strain: Low Risk  (10/27/2023)    Overall Financial Resource Strain (CARDIA)     Difficulty of Paying Living Expenses: Not hard at all   Food Insecurity: No Food Insecurity (11/4/2024)    Hunger Vital Sign     Worried About Running Out of Food in the Last Year: Never true     Ran Out of Food in the Last Year: Never true   Transportation Needs: No Transportation Needs (11/4/2024)    PRAPARE - Transportation     Lack of Transportation (Medical): No     Lack of Transportation (Non-Medical): No   Housing Stability: Unknown (11/4/2024)    Housing Stability Vital Sign     Unable to Pay for Housing in the Last Year: No     Homeless in the Last Year: No   Utilities: Not At Risk (11/4/2024)    [facility] Utilities     Threatened with loss of utilities: No     No results found.    Objective     /72 (BP Location: Left arm, Patient Position: Sitting, Cuff Size: Adult)   Pulse 72   Temp 97.8 °F (36.6 °C)   Ht 5' 1\" (1.549 m)   Wt 53.2 kg (117 lb 3.2 oz)   SpO2 98%   BMI 22.14 kg/m²     Physical Exam  Vitals and nursing note reviewed.   Constitutional:       General: She is not in acute distress.     Appearance: Normal appearance. She is well-developed and well-groomed. She is not ill-appearing.   HENT:      Head: Normocephalic.      Right Ear: Tympanic membrane, ear canal and external ear normal.      Left Ear: Tympanic membrane, ear canal and external ear normal.      Nose: Nose normal.      Mouth/Throat:      Mouth: Mucous membranes are moist.      Pharynx: Oropharynx is clear.   Eyes:      Conjunctiva/sclera: Conjunctivae normal.      Pupils: Pupils are equal, round, and reactive to light.   Neck:      Thyroid: No thyromegaly.      Vascular: No carotid bruit.   Cardiovascular:      Rate and Rhythm: Normal rate and regular rhythm.      " Pulses:           Posterior tibial pulses are 2+ on the right side and 2+ on the left side.      Heart sounds: Normal heart sounds.   Pulmonary:      Effort: Pulmonary effort is normal. No respiratory distress.      Breath sounds: Normal breath sounds and air entry.   Musculoskeletal:      Right lower leg: No edema.      Left lower leg: No edema.   Lymphadenopathy:      Cervical: No cervical adenopathy.   Skin:     General: Skin is warm and dry.      Comments: Right hand middle finger grooved    Neurological:      General: No focal deficit present.      Mental Status: She is alert and oriented to person, place, and time.   Psychiatric:         Attention and Perception: Attention normal.         Mood and Affect: Mood normal.         Behavior: Behavior normal.         Thought Content: Thought content normal.         Judgment: Judgment normal.

## 2024-11-04 NOTE — ASSESSMENT & PLAN NOTE
Will check xray today - r/u anomaly  Could check vitamins - pt declines today  Recommend Derm eval  again - pt declines  Will monitor    Orders:    XR finger right third digit-middle; Future

## 2024-11-04 NOTE — ASSESSMENT & PLAN NOTE
Slight increase  Total 213;   Currently taking pravastatin 5 days/week  She will increase to daily with dinner  Recheck lipid panel again in 3 months      Orders:    Lipid panel; Future

## 2024-11-04 NOTE — PATIENT INSTRUCTIONS
Medicare Preventive Visit Patient Instructions  Thank you for completing your Welcome to Medicare Visit or Medicare Annual Wellness Visit today. Your next wellness visit will be due in one year (11/5/2025).  The screening/preventive services that you may require over the next 5-10 years are detailed below. Some tests may not apply to you based off risk factors and/or age. Screening tests ordered at today's visit but not completed yet may show as past due. Also, please note that scanned in results may not display below.  Preventive Screenings:  Service Recommendations Previous Testing/Comments   Colorectal Cancer Screening  * Colonoscopy    * Fecal Occult Blood Test (FOBT)/Fecal Immunochemical Test (FIT)  * Fecal DNA/Cologuard Test  * Flexible Sigmoidoscopy Age: 45-75 years old   Colonoscopy: every 10 years (may be performed more frequently if at higher risk)  OR  FOBT/FIT: every 1 year  OR  Cologuard: every 3 years  OR  Sigmoidoscopy: every 5 years  Screening may be recommended earlier than age 45 if at higher risk for colorectal cancer. Also, an individualized decision between you and your healthcare provider will decide whether screening between the ages of 76-85 would be appropriate. Colonoscopy: 09/19/2019  FOBT/FIT: Not on file  Cologuard: Not on file  Sigmoidoscopy: Not on file          Breast Cancer Screening Age: 40+ years old  Frequency: every 1-2 years  Not required if history of left and right mastectomy Mammogram: 07/30/2024    Screening Current   Cervical Cancer Screening Between the ages of 21-29, pap smear recommended once every 3 years.   Between the ages of 30-65, can perform pap smear with HPV co-testing every 5 years.   Recommendations may differ for women with a history of total hysterectomy, cervical cancer, or abnormal pap smears in past. Pap Smear: 11/15/2021    Screening Not Indicated   Hepatitis C Screening Once for adults born between 1945 and 1965  More frequently in patients at high risk  for Hepatitis C Hep C Antibody: 10/19/2017    Screening Current   Diabetes Screening 1-2 times per year if you're at risk for diabetes or have pre-diabetes Fasting glucose: 83 mg/dL (10/23/2024)  A1C: 5.2 (4/26/2023)  Screening Current   Cholesterol Screening Once every 5 years if you don't have a lipid disorder. May order more often based on risk factors. Lipid panel: 10/23/2024    Screening Not Indicated  History Lipid Disorder     Other Preventive Screenings Covered by Medicare:  Abdominal Aortic Aneurysm (AAA) Screening: covered once if your at risk. You're considered to be at risk if you have a family history of AAA.  Lung Cancer Screening: covers low dose CT scan once per year if you meet all of the following conditions: (1) Age 55-77; (2) No signs or symptoms of lung cancer; (3) Current smoker or have quit smoking within the last 15 years; (4) You have a tobacco smoking history of at least 20 pack years (packs per day multiplied by number of years you smoked); (5) You get a written order from a healthcare provider.  Glaucoma Screening: covered annually if you're considered high risk: (1) You have diabetes OR (2) Family history of glaucoma OR (3)  aged 50 and older OR (4)  American aged 65 and older  Osteoporosis Screening: covered every 2 years if you meet one of the following conditions: (1) You're estrogen deficient and at risk for osteoporosis based off medical history and other findings; (2) Have a vertebral abnormality; (3) On glucocorticoid therapy for more than 3 months; (4) Have primary hyperparathyroidism; (5) On osteoporosis medications and need to assess response to drug therapy.   Last bone density test (DXA Scan): 08/02/2024.  HIV Screening: covered annually if you're between the age of 15-65. Also covered annually if you are younger than 15 and older than 65 with risk factors for HIV infection. For pregnant patients, it is covered up to 3 times per  pregnancy.    Immunizations:  Immunization Recommendations   Influenza Vaccine Annual influenza vaccination during flu season is recommended for all persons aged >= 6 months who do not have contraindications   Pneumococcal Vaccine   * Pneumococcal conjugate vaccine = PCV13 (Prevnar 13), PCV15 (Vaxneuvance), PCV20 (Prevnar 20)  * Pneumococcal polysaccharide vaccine = PPSV23 (Pneumovax) Adults 19-65 yo with certain risk factors or if 65+ yo  If never received any pneumonia vaccine: recommend Prevnar 20 (PCV20)  Give PCV20 if previously received 1 dose of PCV13 or PPSV23   Hepatitis B Vaccine 3 dose series if at intermediate or high risk (ex: diabetes, end stage renal disease, liver disease)   Respiratory syncytial virus (RSV) Vaccine - COVERED BY MEDICARE PART D  * RSVPreF3 (Arexvy) CDC recommends that adults 60 years of age and older may receive a single dose of RSV vaccine using shared clinical decision-making (SCDM)   Tetanus (Td) Vaccine - COST NOT COVERED BY MEDICARE PART B Following completion of primary series, a booster dose should be given every 10 years to maintain immunity against tetanus. Td may also be given as tetanus wound prophylaxis.   Tdap Vaccine - COST NOT COVERED BY MEDICARE PART B Recommended at least once for all adults. For pregnant patients, recommended with each pregnancy.   Shingles Vaccine (Shingrix) - COST NOT COVERED BY MEDICARE PART B  2 shot series recommended in those 19 years and older who have or will have weakened immune systems or those 50 years and older     Health Maintenance Due:      Topic Date Due   • Breast Cancer Screening: Mammogram  07/30/2025   • Hepatitis C Screening  Completed     Immunizations Due:      Topic Date Due   • Influenza Vaccine (1) 09/01/2024     Advance Directives   What are advance directives?  Advance directives are legal documents that state your wishes and plans for medical care. These plans are made ahead of time in case you lose your ability to make  decisions for yourself. Advance directives can apply to any medical decision, such as the treatments you want, and if you want to donate organs.   What are the types of advance directives?  There are many types of advance directives, and each state has rules about how to use them. You may choose a combination of any of the following:  Living will:  This is a written record of the treatment you want. You can also choose which treatments you do not want, which to limit, and which to stop at a certain time. This includes surgery, medicine, IV fluid, and tube feedings.   Durable power of  for healthcare (DPAHC):  This is a written record that states who you want to make healthcare choices for you when you are unable to make them for yourself. This person, called a proxy, is usually a family member or a friend. You may choose more than 1 proxy.  Do not resuscitate (DNR) order:  A DNR order is used in case your heart stops beating or you stop breathing. It is a request not to have certain forms of treatment, such as CPR. A DNR order may be included in other types of advance directives.  Medical directive:  This covers the care that you want if you are in a coma, near death, or unable to make decisions for yourself. You can list the treatments you want for each condition. Treatment may include pain medicine, surgery, blood transfusions, dialysis, IV or tube feedings, and a ventilator (breathing machine).  Values history:  This document has questions about your views, beliefs, and how you feel and think about life. This information can help others choose the care that you would choose.  Why are advance directives important?  An advance directive helps you control your care. Although spoken wishes may be used, it is better to have your wishes written down. Spoken wishes can be misunderstood, or not followed. Treatments may be given even if you do not want them. An advance directive may make it easier for your family  to make difficult choices about your care.   Urinary Incontinence   Urinary incontinence (UI)  is when you lose control of your bladder. UI develops because your bladder cannot store or empty urine properly. The 3 most common types of UI are stress incontinence, urge incontinence, or both.  Medicines:   May be given to help strengthen your bladder control. Report any side effects of medication to your healthcare provider.  Do pelvic muscle exercises often:  Your pelvic muscles help you stop urinating. Squeeze these muscles tight for 5 seconds, then relax for 5 seconds. Gradually work up to squeezing for 10 seconds. Do 3 sets of 15 repetitions a day, or as directed. This will help strengthen your pelvic muscles and improve bladder control.  Train your bladder:  Go to the bathroom at set times, such as every 2 hours, even if you do not feel the urge to go. You can also try to hold your urine when you feel the urge to go. For example, hold your urine for 5 minutes when you feel the urge to go. As that becomes easier, hold your urine for 10 minutes.   Self-care:   Keep a UI record.  Write down how often you leak urine and how much you leak. Make a note of what you were doing when you leaked urine.  Drink liquids as directed. You may need to limit the amount of liquid you drink to help control your urine leakage. Do not drink any liquid right before you go to bed. Limit or do not have drinks that contain caffeine or alcohol.   Prevent constipation.  Eat a variety of high-fiber foods. Good examples are high-fiber cereals, beans, vegetables, and whole-grain breads. Walking is the best way to trigger your intestines to have a bowel movement.  Exercise regularly and maintain a healthy weight.  Weight loss and exercise will decrease pressure on your bladder and help you control your leakage.   Use a catheter as directed  to help empty your bladder. A catheter is a tiny, plastic tube that is put into your bladder to drain your  urine.   Go to behavior therapy as directed.  Behavior therapy may be used to help you learn to control your urge to urinate.     © Copyright [employer] 2018 Information is for End User's use only and may not be sold, redistributed or otherwise used for commercial purposes. All illustrations and images included in CareNotes® are the copyrighted property of [email or web address] or [employer]

## 2024-12-30 DIAGNOSIS — N95.2 VAGINAL ATROPHY: ICD-10-CM

## 2024-12-31 RX ORDER — ESTRADIOL 0.1 MG/G
0.5 CREAM VAGINAL 2 TIMES WEEKLY
Qty: 42.5 G | Refills: 1 | Status: SHIPPED | OUTPATIENT
Start: 2025-01-02

## 2025-03-10 ENCOUNTER — PATIENT MESSAGE (OUTPATIENT)
Dept: FAMILY MEDICINE CLINIC | Facility: CLINIC | Age: 80
End: 2025-03-10

## 2025-03-14 ENCOUNTER — TELEPHONE (OUTPATIENT)
Dept: ADMINISTRATIVE | Facility: OTHER | Age: 80
End: 2025-03-14

## 2025-03-14 NOTE — TELEPHONE ENCOUNTER
03/14/25 3:45 PM    Patient contacted to bring Advance Directive, POLST, or Living Will document to next scheduled pcp visit.[facility] Department spoke with patient/ caregiver.    Thank you.  Paola Adkins MA  [facility]

## 2025-03-18 ENCOUNTER — OFFICE VISIT (OUTPATIENT)
Dept: FAMILY MEDICINE CLINIC | Facility: CLINIC | Age: 80
End: 2025-03-18
Payer: MEDICARE

## 2025-03-18 VITALS
HEIGHT: 61 IN | SYSTOLIC BLOOD PRESSURE: 120 MMHG | OXYGEN SATURATION: 99 % | DIASTOLIC BLOOD PRESSURE: 72 MMHG | WEIGHT: 115.2 LBS | TEMPERATURE: 97.5 F | BODY MASS INDEX: 21.75 KG/M2 | HEART RATE: 65 BPM

## 2025-03-18 DIAGNOSIS — M48.061 LUMBAR FORAMINAL STENOSIS: Primary | ICD-10-CM

## 2025-03-18 PROCEDURE — G2211 COMPLEX E/M VISIT ADD ON: HCPCS | Performed by: FAMILY MEDICINE

## 2025-03-18 PROCEDURE — 99214 OFFICE O/P EST MOD 30 MIN: CPT | Performed by: FAMILY MEDICINE

## 2025-03-18 NOTE — PROGRESS NOTES
Name: Kaila Mcgregor      : 1945      MRN: [id number]  Encounter Provider: Nina Steiner DO  Encounter Date: 3/18/2025   Encounter department: [facility]  :  Assessment & Plan  Lumbar foraminal stenosis  Symptoms appear secondary to acute exacerbation of her lumbar stenosis.  At this time, we will recheck MRI of her lumbar spine.  Continue with her therapeutic home exercise.  Will refer patient to physical therapy for further evaluation.  Will consider further evaluation with spinal specialist a if needed.  Orders:    MRI lumbar spine wo contrast; Future    Ambulatory Referral to Physical Therapy; Future           History of Present Illness   Back Pain  This is a recurrent problem. Episode onset: 3 mo ago. The problem occurs intermittently. The pain is present in the lumbar spine and gluteal. The quality of the pain is described as stabbing. The pain radiates to the left foot and left thigh. The pain is mild. The symptoms are aggravated by twisting. Associated symptoms include numbness, paresthesias and tingling. Pertinent negatives include no abdominal pain, bladder incontinence, bowel incontinence, chest pain, dysuria, fever or headaches. She has tried NSAIDs for the symptoms. The treatment provided mild relief.     Review of Systems   Constitutional:  Negative for activity change, chills, fatigue and fever.   HENT:  Negative for congestion, ear pain, sinus pressure and sore throat.    Eyes:  Negative for redness, itching and visual disturbance.   Respiratory:  Negative for cough and shortness of breath.    Cardiovascular:  Negative for chest pain and palpitations.   Gastrointestinal:  Negative for abdominal pain, bowel incontinence, diarrhea and nausea.   Endocrine: Negative for cold intolerance and heat intolerance.   Genitourinary:  Negative for bladder incontinence, dysuria, flank pain and frequency.   Musculoskeletal:  Positive for back pain. Negative for arthralgias, gait  "problem and myalgias.   Skin:  Negative for color change.   Allergic/Immunologic: Negative for environmental allergies.   Neurological:  Positive for tingling, numbness and paresthesias. Negative for dizziness and headaches.   Psychiatric/Behavioral:  Negative for behavioral problems and sleep disturbance.        Objective   /72 (BP Location: Left arm, Patient Position: Sitting, Cuff Size: Adult)   Pulse 65   Temp 97.5 °F (36.4 °C) (Temporal)   Ht 5' 1\" (1.549 m)   Wt 52.3 kg (115 lb 3.2 oz)   SpO2 99%   BMI 21.77 kg/m²      Physical Exam  Vitals reviewed.   Constitutional:       General: She is not in acute distress.     Appearance: Normal appearance. She is well-developed. She is not ill-appearing.   HENT:      Head: Normocephalic and atraumatic.      Right Ear: Hearing and external ear normal.      Left Ear: Hearing and external ear normal.      Nose: Nose normal. No nasal deformity or septal deviation.      Mouth/Throat:      Mouth: Mucous membranes are moist.      Pharynx: Oropharynx is clear.   Eyes:      General: Lids are normal.      Extraocular Movements: Extraocular movements intact.      Conjunctiva/sclera: Conjunctivae normal.      Pupils: Pupils are equal, round, and reactive to light.   Neck:      Thyroid: No thyromegaly.      Trachea: Trachea normal.   Cardiovascular:      Rate and Rhythm: Normal rate.   Pulmonary:      Effort: Pulmonary effort is normal. No respiratory distress.   Abdominal:      General: There is no distension.      Tenderness: There is no guarding.   Musculoskeletal:         General: Normal range of motion.      Cervical back: Normal range of motion and neck supple. No edema or erythema.      Lumbar back: Normal range of motion. Negative right straight leg raise test and negative left straight leg raise test.   Skin:     General: Skin is warm and dry.   Neurological:      Mental Status: She is alert.      Cranial Nerves: No cranial nerve deficit.      Sensory: No " sensory deficit.   Psychiatric:         Speech: Speech normal.         Behavior: Behavior normal. Behavior is cooperative.         Thought Content: Thought content normal.         Judgment: Judgment normal.

## 2025-03-25 ENCOUNTER — HOSPITAL ENCOUNTER (OUTPATIENT)
Facility: MEDICAL CENTER | Age: 80
Discharge: HOME | End: 2025-03-25
Payer: MEDICARE

## 2025-03-25 DIAGNOSIS — M48.061 LUMBAR FORAMINAL STENOSIS: ICD-10-CM

## 2025-03-25 PROCEDURE — 72148 MRI LUMBAR SPINE W/O DYE: CPT

## 2025-03-26 ENCOUNTER — RESULTS FOLLOW-UP (OUTPATIENT)
Dept: FAMILY MEDICINE CLINIC | Facility: CLINIC | Age: 80
End: 2025-03-26

## 2025-03-26 DIAGNOSIS — M48.061 LUMBAR FORAMINAL STENOSIS: Primary | ICD-10-CM

## 2025-03-27 NOTE — TELEPHONE ENCOUNTER
Relayed results to (patient/patient representative as listed on communication consent form) as per provider message. Patient/Patient Representative expressed understanding and did not have any further questions.

## 2025-04-01 ENCOUNTER — EVALUATION (OUTPATIENT)
Dept: PHYSICAL THERAPY | Facility: CLINIC | Age: 80
End: 2025-04-01
Payer: MEDICARE

## 2025-04-01 DIAGNOSIS — I10 ESSENTIAL HYPERTENSION: ICD-10-CM

## 2025-04-01 DIAGNOSIS — M48.061 LUMBAR FORAMINAL STENOSIS: Primary | ICD-10-CM

## 2025-04-01 DIAGNOSIS — E78.5 HYPERLIPIDEMIA, UNSPECIFIED HYPERLIPIDEMIA TYPE: ICD-10-CM

## 2025-04-01 PROCEDURE — 97112 NEUROMUSCULAR REEDUCATION: CPT

## 2025-04-01 PROCEDURE — 97161 PT EVAL LOW COMPLEX 20 MIN: CPT

## 2025-04-01 NOTE — PROGRESS NOTES
PT Evaluation     Today's date: 2025  Patient name: Kaila Mcgregor  : 1945  MRN: [id number]  Referring provider: Nina Steiner DO  Dx:   Encounter Diagnosis     ICD-10-CM    1. Lumbar foraminal stenosis  M48.061                      Assessment  Impairments: abnormal or restricted ROM, activity intolerance, impaired physical strength, lacks appropriate home exercise program, pain with function, poor posture , poor body mechanics, participation limitations and endurance  Symptom irritability: low    Assessment details: Patient is a 79 year old female that presents to outpatient physical therapy with lumbar foraminal stenosis with radicular like symptoms. Recent MRI findings indicate multilevel degenerative disc and facet disease throughout the lumbar spine and Intraspinal synovial cyst on the left at L4-L5 that causes mass effect on the descending left L5 nerve root. Pt exhibits limited lumbopelvic mobility and decreased functional core strength. She also exhibits increase neural tension to the L sciatic nerve with symptom reproduction with sciatic nerve tensioners. Pt demonstrates increased pain, decreased ROM, decreased strength, decreased activity tolerance, and decreased functional activity due to pain. Pt appears motivated; HEP was reviewed and given to patient. Pt would benefit from skilled physical therapy to address noted impairments, meet patient's goals, and to return to PLOF.   Thank you for this referral.    Understanding of Dx/Px/POC: good     Prognosis: fair    Goals  STGs:   1. Pt will be able to demonstrate an increase of strength by at least 1/2 grade within 4 weeks   2. Pt will be able to achieve increased ROM by at least 25% within 4 weeks.   3. Pt will be able to report pain less than 5/10 at worse within 4 weeks.   4. Pt will be able to demonstrate improved hip flexor strength to at least 4+/5 MMT within 4 weeks   5. Pt will be able to report no radicular symptoms below glut region  within 4 weeks.  6. Pt will be able to performed bed mobility without increase of symptoms within 4 weeks.     LTGs:   1. Pt will be independent with all IADLs without pain or discomfort upon discharge.   2. Pt will be independent with HEP upon discharge   3. Pt will be able to report no pain or discomfort with all work duties and recreational activities for a full day upon discharge.  4. Pt will be able to report 'no difficulty' with heavy household activities such as cleaning or lifting at least 10 lbs to waist upon discharge         Plan  Patient would benefit from: PT eval and skilled physical therapy  Planned modality interventions: cryotherapy, electrical stimulation/Russian stimulation, iontophoresis, low level laser therapy, TENS, thermotherapy: hydrocollator packs, ultrasound, traction and unattended electrical stimulation    Planned therapy interventions: abdominal trunk stabilization, IADL retraining, joint mobilization, manual therapy, massage, muscle pump exercises, neuromuscular re-education, patient education, postural training, strengthening, stretching, therapeutic activities, therapeutic training, therapeutic exercise, home exercise program, functional ROM exercises, gait training, balance/weight bearing training, balance, ADL training, activity modification, flexibility, Feldman taping and breathing training    Treatment plan discussed with: patient  Plan details: Visits: 1-2x a week   Duration: 8 weeks     Will hold on scheduling additional appointments at this time until follow up with spine and pain management.       Subjective Evaluation    History of Present Illness  Mechanism of injury: Pt states that she has been have pain/discomfort for the past few months. Reports that her symptoms do not stop her from performing her normal day to day, but pain is present in the lumbar spine and gluteal. Denies ALEXSANDER. The quality of the pain is described as stabbing. The pain radiates to the left foot and  left thigh. The pain is mild. The symptoms are aggravated by twisting.  Social involvement: retired/active/pickleball    Denies changes in bowel/bladder. Denies saddle anesthesia. Denies numbness/tingling  no abdominal pain, bladder incontinence, bowel incontinence, chest pain, dysuria, fever or headaches     AGGS: prolonged sitting, lifting or twisting, getting out of bed  EASES: standing, walking, staying active  Goals: decrease pain and tingling      Imaging findings: Lumbar MRI on 3/25/2025 IMPRESSION:     1.  Multilevel degenerative disc and facet disease throughout the lumbar spine, as described above with mild to moderate spinal canal narrowing at L3-L4 and L4-L5. There is also narrowing of the lateral recesses at these levels.  2.  Intraspinal synovial cyst on the left at L4-L5 that causes mass effect on the descending left L5 nerve root. Correlate for left L5 radiculopathy.    Patient Goals  Patient goals for therapy: decreased pain    Pain  At best pain ratin  At worst pain ratin  Quality: dull ache, tight, discomfort and radiating        Objective     Neurological Testing     Sensation     Lumbar   Left   Intact: light touch    Right   Intact: light touch    Reflexes   Left   Patellar (L4): normal (2+)  Achilles (S1): normal (2+)  Clonus sign: negative    Right   Patellar (L4): normal (2+)  Achilles (S1): normal (2+)  Clonus sign: negative    Active Range of Motion     Lumbar   Flexion:  Restriction level: minimal  Extension:  Restriction level: minimal  Left lateral flexion:  Restriction level: minimal  Right lateral flexion:  Restriction level: minimal  Left rotation:  Restriction level: moderate  Right rotation:  Restriction level: moderate    Joint Play   Joints within functional limits: T11, T12, L1 and L2     Hypomobile: L3, L4, L5 and S1     Pain: L4   Mechanical Assessment    Cervical      Thoracic      Lumbar    Sitting flexion: sustained positions  Pain location: peripheralized  Lying  extension: repeated movements  Pain location: no change  Pain intensity: better    Strength/Myotome Testing     Lumbar   Left   Heel walk: normal  Toe walk: normal    Right   Heel walk: normal  Toe walk: normal    Left Hip   Planes of Motion   Flexion: 4- (with pain)  Extension: 4+  Abduction: 4  External rotation: 4+  Internal rotation: 4    Right Hip   Planes of Motion   Flexion: 4+  Extension: 4+  Abduction: 4  External rotation: 4+  Internal rotation: 4    Left Knee   Flexion: 4+  Extension: 4+    Right Knee   Flexion: 4+  Extension: 4+    Left Ankle/Foot   Dorsiflexion: 5  Plantar flexion: 5  Great toe extension: 5    Right Ankle/Foot   Dorsiflexion: 5  Plantar flexion: 5  Great toe extension: 5    Tests     Lumbar     Left   Positive quadrant and slump test.   Negative crossed SLR and passive SLR.     Right   Negative crossed SLR, passive SLR, quadrant and slump test.     Left Pelvic Girdle/Sacrum   Negative: active SLR test.     Right Pelvic Girdle/Sacrum   Negative: active SLR test.     Left Hip   Negative GRANT and FADIR.     Right Hip   Negative GRANT and FADIR.     Ambulation     Quality of Movement During Gait   Trunk  Trunk within functional limits.     Functional Assessment      Squat    Left valgus.              Precautions:   Patient Active Problem List   Diagnosis    Essential hypertension    Hyperlipidemia    Generalized osteoarthritis    Sciatica    Seasonal allergies    Chondromalacia patellae    Ankylosis of knee joint    Achilles tendinitis    Achilles bursitis    Upper respiratory tract infection    Stage 3a chronic kidney disease (HCC)    Enlarged lymph node in neck    Chronic pain of right knee    Elevated bilirubin    Herpes labialis    Nail deformity       Daily Treatment Diary    Date 4/1            FOTO IE -             Re-Eval IE               Manuals                                                        Neuro Re-Ed     Lumbar extension at wall 2x10            Seated sciatic nerve  "floss 2x10            Supine sciatic nerve glide 2x10                                                                 Ther Ex    Bike - ROM             Seated piriformis stretch 20\" hold 5x            SLR             Bridge with TrA             Clamshell with TB             Standing hip abd and ext             Leg press                                                                 Ther Activity                              Gait Training                              Modalities                                       "

## 2025-04-02 RX ORDER — LISINOPRIL 2.5 MG/1
2.5 TABLET ORAL DAILY
Qty: 90 TABLET | Refills: 1 | Status: SHIPPED | OUTPATIENT
Start: 2025-04-02

## 2025-04-02 RX ORDER — PRAVASTATIN SODIUM 10 MG
TABLET ORAL
Qty: 65 TABLET | Refills: 1 | Status: SHIPPED | OUTPATIENT
Start: 2025-04-02

## 2025-04-07 ENCOUNTER — CONSULT (OUTPATIENT)
Dept: PAIN MEDICINE | Facility: MEDICAL CENTER | Age: 80
End: 2025-04-07
Payer: MEDICARE

## 2025-04-07 VITALS — BODY MASS INDEX: 21.71 KG/M2 | WEIGHT: 115 LBS | HEIGHT: 61 IN

## 2025-04-07 DIAGNOSIS — M71.38 CYST OF LUMBAR FACET JOINT: Primary | ICD-10-CM

## 2025-04-07 DIAGNOSIS — M48.061 LUMBAR FORAMINAL STENOSIS: ICD-10-CM

## 2025-04-07 DIAGNOSIS — M54.16 LUMBAR RADICULITIS: ICD-10-CM

## 2025-04-07 PROCEDURE — G2211 COMPLEX E/M VISIT ADD ON: HCPCS | Performed by: PHYSICAL MEDICINE & REHABILITATION

## 2025-04-07 PROCEDURE — 99204 OFFICE O/P NEW MOD 45 MIN: CPT | Performed by: PHYSICAL MEDICINE & REHABILITATION

## 2025-04-07 NOTE — PROGRESS NOTES
Assessment  1. Cyst of lumbar facet joint    2. Lumbar radiculitis    3. Lumbar foraminal stenosis        Plan  We'll schedule patient for left L5-S1 transforaminal epidural steroid injection. This may need to be repeated.  Complete risks and benefits including bleeding, infection, tissue reaction, allergic reaction were discussed. Verbal consent obtained.    My impressions and treatment recommendations were discussed in detail with the patient who verbalized understanding and had no further questions.  Discharge instructions were provided. I personally saw and examined the patient and I agree with the above discussed plan of care.    Orders Placed This Encounter   Procedures    FL spine and pain procedure     Standing Status:   Future     Expiration Date:   4/7/2026     Reason for Exam::   (L) L5-S1 TFESI     Anticoagulant hold needed?:   no     No orders of the defined types were placed in this encounter.      History of Present Illness    Kaila Mcgregor is a 79 y.o. female seen in consultation at the request of Dr. Steiner regarding back and radiating left leg pain complaints.  She has been experiencing symptoms over the past few months with moderate to severe intensity pain this is intermittent in nature and worse in the morning and then intermittent during the day depending on her activity.  She describes pain that is sharp shooting and pins-and-needles in quality radiating down the left leg in an L5 distribution.    Aggravating factors include standing going from a sitting to stand position.  Alleviating factors include relaxation.    Diagnostic studies include MRI of the lumbar spine demonstrating an L4-5 facet cyst with anterolisthesis at this level and likely irritation of the traversing L5 nerve root.    She has had a physical therapy consultation but has not started therapy yet.  She has not had relief from chiropractic manipulation.  Moderate relief from local heat or ice application.    Social  history negative for tobacco and marijuana use positive for 3 times per week alcohol consumption.    Currently using ibuprofen usually twice daily with some improvement in symptoms.    She is quite active and would like to continue her activities including hiking walking and pickleball.    I have personally reviewed and/or updated the patient's past medical history, past surgical history, family history, social history, current medications, allergies, and vital signs today.     Review of Systems   Constitutional:  Negative for chills and fever.   HENT:  Positive for hearing loss. Negative for ear pain, sinus pain and sore throat.    Eyes:  Negative for pain and redness.   Respiratory:  Negative for shortness of breath and wheezing.    Cardiovascular:  Negative for palpitations and leg swelling.   Gastrointestinal:  Negative for abdominal pain, constipation and vomiting.   Endocrine: Negative for polydipsia and polyuria.   Genitourinary:  Negative for difficulty urinating and hematuria.   Musculoskeletal:  Positive for arthralgias, back pain, gait problem and myalgias. Negative for joint swelling.   Skin:  Negative for rash.   Neurological:  Positive for numbness. Negative for dizziness, weakness and headaches.   Psychiatric/Behavioral:  Negative for confusion and sleep disturbance. The patient is not nervous/anxious.        Patient Active Problem List   Diagnosis    Essential hypertension    Hyperlipidemia    Generalized osteoarthritis    Sciatica    Seasonal allergies    Chondromalacia patellae    Ankylosis of knee joint    Achilles tendinitis    Achilles bursitis    Upper respiratory tract infection    Stage 3a chronic kidney disease (HCC)    Enlarged lymph node in neck    Chronic pain of right knee    Elevated bilirubin    Herpes labialis    Nail deformity       Past Medical History:   Diagnosis Date    Allergic 35 yrs ago    Arthritis     Chronic kidney disease     HL (hearing loss) wear HA's    Hyperlipidemia      Hypertension        Past Surgical History:   Procedure Laterality Date    BLADDER SURGERY      LAST ASSESSED [id number]    DILATION AND CURETTAGE OF UTERUS      LAST ASSESSED [id number]    EYE SURGERY  cataracts    2022    TOOTH EXTRACTION      LAST ASSESSED [id number]       Family History   Problem Relation Age of Onset    Dementia Mother     Hypertension Mother     Skin cancer Mother         age unknown    Hearing loss Mother     Vision loss Mother     No Known Problems Father     No Known Problems Daughter     Depression Daughter     No Known Problems Maternal Grandmother     No Known Problems Maternal Grandfather     No Known Problems Paternal Grandmother     No Known Problems Paternal Grandfather     No Known Problems Paternal Aunt        Social History     Occupational History    Not on file   Tobacco Use    Smoking status: Never     Passive exposure: Never    Smokeless tobacco: Never   Vaping Use    Vaping status: Never Used   Substance and Sexual Activity    Alcohol use: Yes     Alcohol/week: 5.0 standard drinks of alcohol     Types: 5 Glasses of wine per week     Comment: social     Drug use: No    Sexual activity: Yes     Partners: Male     Birth control/protection: Post-menopausal       Current Outpatient Medications on File Prior to Visit   Medication Sig    Calcium Carbonate-Vitamin D3 600-400 MG-UNIT TABS Take by mouth    Cholecalciferol 25 MCG (1000 UT) tablet Take 1,000 Units by mouth daily    estradiol (ESTRACE) 0.1 mg/g vaginal cream Insert 0.5 g into the vagina 2 (two) times a week    fluticasone (FLONASE) 50 mcg/act nasal spray 1 spray into each nostril if needed for rhinitis    ibuprofen (MOTRIN) 200 mg tablet Take 200 mg by mouth if needed    lisinopril (ZESTRIL) 2.5 mg tablet TAKE 1 TABLET BY MOUTH EVERY DAY    Lutein 20 MG CAPS Take by mouth    Polyethyl Glycol-Propyl Glycol (SYSTANE OP) Apply to eye    pravastatin (PRAVACHOL) 10 mg tablet TAKE 1 TABLET ONCE DAILY 5 DAYS A WEEK     No current  "facility-administered medications on file prior to visit.       Allergies   Allergen Reactions    Shellfish Allergy - Food Allergy GI Intolerance     Mollusks/clams/oysters    Erythromycin Rash       Physical Exam    Ht 5' 1\" (1.549 m)   Wt 52.2 kg (115 lb)   BMI 21.73 kg/m²     Constitutional: normal, well developed, well nourished, alert, in no distress and non-toxic and no overt pain behavior.  Eyes: anicteric  HEENT: grossly intact  Neck: supple, symmetric, trachea midline and no masses   Pulmonary:even and unlabored  Cardiovascular:No edema or pitting edema present  Psychiatric:Mood and affect appropriate  Neurologic:Cranial Nerves II-XII grossly intact, bilateral lower extremity strength is normal, bilateral lower extremity muscle stretch reflexes are physiologic and symmetric at the knees and ankles, negative ankle clonus, positive straight leg raise from a seated position on the left  Musculoskeletal:normal, except for some pain reproduced with lumbar extension as well as extension with rotation towards the left    Imaging    Study Result    Narrative & Impression   MRI LUMBAR SPINE WITHOUT CONTRAST     INDICATION: M48.061: Spinal stenosis, lumbar region without neurogenic claudication.     COMPARISON:  None.     TECHNIQUE:  Multiplanar, multisequence imaging of the lumbar spine was performed. .        IMAGE QUALITY:  Diagnostic     FINDINGS:     VERTEBRAL BODIES:  There are 5 lumbar type vertebral bodies.  Grade 1 anterolisthesis of L2 on L3, L3 on L4, and L4 on L5.  No compression fracture.    Normal marrow signal is identified within the visualized bony structures.  No discrete marrow lesion.     SACRUM:  Normal signal within the sacrum. No evidence of insufficiency or stress fracture.     DISTAL CORD AND CONUS:  Normal size and signal within the distal cord and conus.     PARASPINAL SOFT TISSUES:  Fatty atrophy of the paraspinal musculature.     LOWER THORACIC DISC SPACES:  Normal disc height and " signal.  No disc herniation, canal stenosis or foraminal narrowing.     LUMBAR DISC SPACES:     L1-L2:  Mild bilateral facet degenerative change. No significant disc bulge. No spinal canal stenosis. No neuroforaminal stenosis.     L2-L3:  Moderate bilateral facet joint. Ligamentum flavum hypertrophy. Minimal disc bulge. Mild spinal canal narrowing. Moderate left and mild right neuroforaminal narrowing.     L3-L4:  Severe bilateral facet degenerative change. Ligamentum flavum hypertrophy. Small circumferential disc bulge. Mild to moderate spinal canal narrowing. There is intraspinal synovial cyst on the left measuring 3 mm. There is narrowing of the right   lateral recess. Mild bilateral neuroforaminal narrowing     L4-L5: Severe bilateral facet degenerative change. Circumferential disc bulge. Mild to moderate spinal canal narrowing. Mild bilateral neural foraminal narrowing. There is an intraspinal synovial cyst on the left measuring 4 mm (series 7, image 28). This   likely causes mass effect on the descending left L5 nerve root. There is narrowing of the bilateral lateral recesses. Mild bilateral neural foraminal narrowing.     L5-S1: Moderate bilateral facet degenerative change. No significant displacement or spinal canal narrowing. Mild left neuroforaminal narrowing.     OTHER FINDINGS:  None.     IMPRESSION:     1.  Multilevel degenerative disc and facet disease throughout the lumbar spine, as described above with mild to moderate spinal canal narrowing at L3-L4 and L4-L5. There is also narrowing of the lateral recesses at these levels.  2.  Intraspinal synovial cyst on the left at L4-L5 that causes mass effect on the descending left L5 nerve root. Correlate for left L5 radiculopathy.        Workstation performed: [id number]

## 2025-04-16 ENCOUNTER — HOSPITAL ENCOUNTER (OUTPATIENT)
Dept: RADIOLOGY | Facility: MEDICAL CENTER | Age: 80
Discharge: HOME | End: 2025-04-16
Payer: MEDICARE

## 2025-04-16 VITALS
DIASTOLIC BLOOD PRESSURE: 84 MMHG | HEART RATE: 50 BPM | TEMPERATURE: 96.4 F | OXYGEN SATURATION: 100 % | RESPIRATION RATE: 18 BRPM | SYSTOLIC BLOOD PRESSURE: 150 MMHG

## 2025-04-16 DIAGNOSIS — M54.16 LUMBAR RADICULITIS: ICD-10-CM

## 2025-04-16 PROCEDURE — 64483 NJX AA&/STRD TFRM EPI L/S 1: CPT | Performed by: PHYSICAL MEDICINE & REHABILITATION

## 2025-04-16 RX ORDER — PAPAVERINE HCL 150 MG
10 CAPSULE, EXTENDED RELEASE ORAL ONCE
Status: COMPLETED | OUTPATIENT
Start: 2025-04-16 | End: 2025-04-16

## 2025-04-16 RX ADMIN — IOHEXOL 2 ML: 300 INJECTION, SOLUTION INTRAVENOUS at 09:38

## 2025-04-16 RX ADMIN — DEXAMETHASONE SODIUM PHOSPHATE 10 MG: 10 INJECTION INTRAMUSCULAR; INTRAVENOUS at 09:38

## 2025-04-16 NOTE — H&P
History of Present Illness: The patient is a 79 y.o. female who presents with complaints of left back and leg pain    Past Medical History:   Diagnosis Date    Allergic 35 yrs ago    Arthritis     Chronic kidney disease     HL (hearing loss) wear HA's    Hyperlipidemia     Hypertension        Past Surgical History:   Procedure Laterality Date    BLADDER SURGERY      LAST ASSESSED [id number]    DILATION AND CURETTAGE OF UTERUS      LAST ASSESSED [id number]    EYE SURGERY  cataracts    2022    TOOTH EXTRACTION      LAST ASSESSED [id number]         Current Outpatient Medications:     Calcium Carbonate-Vitamin D3 600-400 MG-UNIT TABS, Take by mouth, Disp: , Rfl:     Cholecalciferol 25 MCG (1000 UT) tablet, Take 1,000 Units by mouth daily, Disp: , Rfl:     estradiol (ESTRACE) 0.1 mg/g vaginal cream, Insert 0.5 g into the vagina 2 (two) times a week, Disp: 42.5 g, Rfl: 1    fluticasone (FLONASE) 50 mcg/act nasal spray, 1 spray into each nostril if needed for rhinitis, Disp: , Rfl:     ibuprofen (MOTRIN) 200 mg tablet, Take 200 mg by mouth if needed, Disp: , Rfl:     lisinopril (ZESTRIL) 2.5 mg tablet, TAKE 1 TABLET BY MOUTH EVERY DAY, Disp: 90 tablet, Rfl: 1    Lutein 20 MG CAPS, Take by mouth, Disp: , Rfl:     Polyethyl Glycol-Propyl Glycol (SYSTANE OP), Apply to eye, Disp: , Rfl:     pravastatin (PRAVACHOL) 10 mg tablet, TAKE 1 TABLET ONCE DAILY 5 DAYS A WEEK, Disp: 65 tablet, Rfl: 1    Allergies   Allergen Reactions    Shellfish Allergy - Food Allergy GI Intolerance     Mollusks/clams/oysters    Erythromycin Rash       Physical Exam:   Vitals:    04/16/25 0918   BP: 154/84   Pulse: (!) 51   Resp: 18   Temp: (!) 96.4 °F (35.8 °C)   SpO2: 100%     General: Awake, Alert, Oriented x 3, Mood and affect appropriate  Respiratory: Respirations even and unlabored  Cardiovascular: Peripheral pulses intact; no edema  Musculoskeletal Exam: left back and leg pain    ASA Score: 3    Patient/Chart Verification  Patient ID Verified:  Verbal  ID Band Applied: No  Consents Confirmed: To be obtained in the Procedural area  Interval H&P(within 24 hr) Complete (required for Outpatients and Surgery Admit only): To be obtained in the Procedural area  Allergies Reviewed: Yes  Anticoag/NSAID held?: NA  Currently on antibiotics?: No  Pregnancy denied?: NA    Assessment:   1. Lumbar radiculitis        Plan: (L) L5-S1 TFESI

## 2025-04-16 NOTE — DISCHARGE INSTR - LAB
Epidural Steroid Injection   WHAT YOU NEED TO KNOW:   An epidural steroid injection (CR) is a procedure to inject steroid medicine into the epidural space. The epidural space is between your spinal cord and vertebrae. Steroids reduce inflammation and fluid buildup in your spine that may be causing pain. You may be given pain medicine along with the steroids.          ACTIVITY  Do not drive or operate machinery today.  No strenuous activity today - bending, lifting, etc.  You may resume normal activites starting tomorrow - start slowly and as tolerated.  You may shower today, but no tub baths or hot tubs.  You may have numbness for several hours from the local anesthetic. Please use caution and common sense, especially with weight-bearing activities.    CARE OF THE INJECTION SITE  If you have soreness or pain, apply ice to the area today (20 minutes on/20 minutes off).  Starting tomorrow, you may use warm, moist heat or ice if needed.  You may have an increase or change in your discomfort for 36-48 hours after your treatment.  Apply ice and continue with any pain medication you have been prescribed.  Notify the [facility] if you have any of the following: redness, drainage, swelling, headache, stiff neck or fever above 100°F.    SPECIAL INSTRUCTIONS  Our office will contact you in approximately 14 days for a progress report.    MEDICATIONS  Continue to take all routine medications.  Our office may have instructed you to hold some medications.    As no general anesthesia was used in today's procedure, you should not experience any side effects related to anesthesia.     If you are diabetic, the steroids used in today's injection may temporarily increase your blood sugar levels after the first few days after your injection. Please keep a close eye on your sugars and alert the doctor who manages your diabetes if your sugars are significantly high from your baseline or you are symptomatic.     If you have a  problem specifically related to your procedure, please call our office at [phone].  Problems not related to your procedure should be directed to your primary care physician.

## 2025-04-21 ENCOUNTER — OFFICE VISIT (OUTPATIENT)
Dept: PHYSICAL THERAPY | Facility: CLINIC | Age: 80
End: 2025-04-21
Attending: NURSE PRACTITIONER
Payer: MEDICARE

## 2025-04-21 DIAGNOSIS — M48.061 LUMBAR FORAMINAL STENOSIS: Primary | ICD-10-CM

## 2025-04-21 PROCEDURE — 97140 MANUAL THERAPY 1/> REGIONS: CPT

## 2025-04-21 PROCEDURE — 97112 NEUROMUSCULAR REEDUCATION: CPT

## 2025-04-21 PROCEDURE — 97110 THERAPEUTIC EXERCISES: CPT

## 2025-04-21 NOTE — PROGRESS NOTES
"Daily Note     Today's date: 2025  Patient name: Kaila Mcgregor  : 1945  MRN: [id number]  Referring provider: Nina Steiner DO  Dx:   Encounter Diagnosis     ICD-10-CM    1. Lumbar foraminal stenosis  M48.061                      Subjective: Pt states that she has not noticed relief or change of symptoms over the last 3 weeks with the HEP or with the lumbar injections. Still gets pain in her leg and the low back when moving to get out of the bed.       Objective: See treatment diary below      Assessment: Tolerated treatment well. Reviewed HEP for form. Updated Hep as noted below to address limited proximal LE strength and endurance. Manual techniques were performed to increase lumbopelvic mobility, specifically to improve L5 joint mobility and hip ER ROM. Education was given on DOMS following the increase of physical activities and exercise. Patient demonstrated fatigue post treatment and would benefit from continued PT      Plan: Continue per plan of care.      Precautions:   Patient Active Problem List   Diagnosis    Essential hypertension    Hyperlipidemia    Generalized osteoarthritis    Sciatica    Seasonal allergies    Chondromalacia patellae    Ankylosis of knee joint    Achilles tendinitis    Achilles bursitis    Upper respiratory tract infection    Stage 3a chronic kidney disease (HCC)    Enlarged lymph node in neck    Chronic pain of right knee    Elevated bilirubin    Herpes labialis    Nail deformity       Daily Treatment Diary    Date            FOTO IE -             Re-Eval IE               Manuals    (L) Hip PROM  JM                                                  Neuro Re-Ed     Lumbar extension at wall 2x10 Pablo 10x    W/ OP 10x           Seated sciatic nerve floss 2x10 2x10           Supine sciatic nerve glide 2x10  2x10                                                               Ther Ex    Bike - ROM             Seated piriformis stretch 20\" hold 5x            SLR  3x8 " (B) HEP          Bridge with TrA  2x10  HEP          Clamshell with TB  [place] TB 3x10  HEP          Standing hip abd and ext  Ext OTB 2x10            Leg press  75# 3x10                                                               Ther Activity                              Gait Training                              Modalities

## 2025-04-28 ENCOUNTER — OFFICE VISIT (OUTPATIENT)
Dept: PHYSICAL THERAPY | Facility: CLINIC | Age: 80
End: 2025-04-28
Attending: NURSE PRACTITIONER
Payer: MEDICARE

## 2025-04-28 DIAGNOSIS — M48.061 LUMBAR FORAMINAL STENOSIS: Primary | ICD-10-CM

## 2025-04-28 PROCEDURE — 97112 NEUROMUSCULAR REEDUCATION: CPT

## 2025-04-28 PROCEDURE — 97110 THERAPEUTIC EXERCISES: CPT

## 2025-04-28 PROCEDURE — 97140 MANUAL THERAPY 1/> REGIONS: CPT

## 2025-04-28 NOTE — PROGRESS NOTES
Daily Note     Today's date: 2025  Patient name: Kaila Mcgregor  : 1945  MRN: [id number]  Referring provider: Nina Steiner DO  Dx:   Encounter Diagnosis     ICD-10-CM    1. Lumbar foraminal stenosis  M48.061                      Subjective: Pt states that she can not say that she feels any improvement with her symptoms. States that random motions at times cause more discomfort and give her some tingling at the base of her L foot.       Objective: See treatment diary below      Assessment: Tolerated treatment well. Progressions made and performed with core stability and strength. Able to performed quadriped with minimal cueing for core stabilize to maintain a neutral spine. Reports fatigue with hip abduction on the L side as noted by decreased form. Updated HEP as noted below. Education was given on DOMS following the increase in dynamic strengthening activities. Patient demonstrated fatigue post treatment and would benefit from continued PT      Plan: Continue per plan of care.      Precautions:   Patient Active Problem List   Diagnosis    Essential hypertension    Hyperlipidemia    Generalized osteoarthritis    Sciatica    Seasonal allergies    Chondromalacia patellae    Ankylosis of knee joint    Achilles tendinitis    Achilles bursitis    Upper respiratory tract infection    Stage 3a chronic kidney disease (HCC)    Enlarged lymph node in neck    Chronic pain of right knee    Elevated bilirubin    Herpes labialis    Nail deformity       Daily Treatment Diary    Date           FOTO IE -             Re-Eval IE               Manuals    (L) Hip PROM  JM JM          Lateral hip mobs   JM - grade 2                                    Neuro Re-Ed     Lumbar extension at wall 2x10 Pablo 10x    W/ OP 10x           Seated sciatic nerve floss 2x10 2x10           Supine sciatic nerve glide 2x10  2x10           Bird dog   LE only 2x10 (B) HEP         Fire hydrant   OTB 2x10 (B) HEP         Side plank  "   Modified 15\" hold 3x (B)          Pallof press   5.5 15x ea direction                       Ther Ex    Bike - ROM   5 mins L1          Seated piriformis stretch 20\" hold 5x            SLR  3x8 (B) HEP          Bridge with TrA  2x10  HEP          Clamshell with TB  [place] TB 3x10  HEP    Side lying 2x10 (L)          Standing hip abd and ext  Ext OTB 2x10            Leg press  75# 3x10                                                               Ther Activity                              Gait Training                              Modalities                                         "

## 2025-04-30 ENCOUNTER — TELEPHONE (OUTPATIENT)
Dept: PAIN MEDICINE | Facility: CLINIC | Age: 80
End: 2025-04-30

## 2025-05-02 ENCOUNTER — APPOINTMENT (OUTPATIENT)
Dept: LAB | Facility: CLINIC | Age: 80
End: 2025-05-02
Payer: MEDICARE

## 2025-05-02 DIAGNOSIS — I10 ESSENTIAL HYPERTENSION: ICD-10-CM

## 2025-05-02 DIAGNOSIS — E78.5 HYPERLIPIDEMIA, UNSPECIFIED HYPERLIPIDEMIA TYPE: ICD-10-CM

## 2025-05-02 LAB
ALBUMIN SERPL BCG-MCNC: 4.3 G/DL (ref 3.5–5)
ALP SERPL-CCNC: 61 U/L (ref 34–104)
ALT SERPL W P-5'-P-CCNC: 14 U/L (ref 7–52)
ANION GAP SERPL CALCULATED.3IONS-SCNC: 10 MMOL/L (ref 4–13)
AST SERPL W P-5'-P-CCNC: 17 U/L (ref 13–39)
BILIRUB SERPL-MCNC: 1.3 MG/DL (ref 0.2–1)
BUN SERPL-MCNC: 30 MG/DL (ref 5–25)
CALCIUM SERPL-MCNC: 9.3 MG/DL (ref 8.4–10.2)
CHLORIDE SERPL-SCNC: 104 MMOL/L (ref 96–108)
CHOLEST SERPL-MCNC: 206 MG/DL (ref ?–200)
CO2 SERPL-SCNC: 26 MMOL/L (ref 21–32)
CREAT SERPL-MCNC: 0.94 MG/DL (ref 0.6–1.3)
GFR SERPL CREATININE-BSD FRML MDRD: 57 ML/MIN/1.73SQ M
GLUCOSE P FAST SERPL-MCNC: 87 MG/DL (ref 65–99)
HDLC SERPL-MCNC: 83 MG/DL
LDLC SERPL CALC-MCNC: 112 MG/DL (ref 0–100)
NONHDLC SERPL-MCNC: 123 MG/DL
POTASSIUM SERPL-SCNC: 4 MMOL/L (ref 3.5–5.3)
PROT SERPL-MCNC: 6.7 G/DL (ref 6.4–8.4)
SODIUM SERPL-SCNC: 140 MMOL/L (ref 135–147)
TRIGL SERPL-MCNC: 56 MG/DL (ref ?–150)

## 2025-05-02 PROCEDURE — 36415 COLL VENOUS BLD VENIPUNCTURE: CPT

## 2025-05-02 PROCEDURE — 80053 COMPREHEN METABOLIC PANEL: CPT

## 2025-05-02 PROCEDURE — 80061 LIPID PANEL: CPT

## 2025-05-03 ENCOUNTER — RESULTS FOLLOW-UP (OUTPATIENT)
Dept: FAMILY MEDICINE CLINIC | Facility: CLINIC | Age: 80
End: 2025-05-03

## 2025-05-05 ENCOUNTER — OFFICE VISIT (OUTPATIENT)
Dept: FAMILY MEDICINE CLINIC | Facility: CLINIC | Age: 80
End: 2025-05-05
Payer: MEDICARE

## 2025-05-05 VITALS
HEART RATE: 57 BPM | BODY MASS INDEX: 21.45 KG/M2 | WEIGHT: 113.6 LBS | TEMPERATURE: 98.2 F | DIASTOLIC BLOOD PRESSURE: 82 MMHG | OXYGEN SATURATION: 99 % | SYSTOLIC BLOOD PRESSURE: 122 MMHG | HEIGHT: 61 IN

## 2025-05-05 DIAGNOSIS — M54.16 LUMBAR RADICULITIS: ICD-10-CM

## 2025-05-05 DIAGNOSIS — R00.1 BRADYCARDIA: ICD-10-CM

## 2025-05-05 DIAGNOSIS — F32.A DEPRESSION, UNSPECIFIED DEPRESSION TYPE: ICD-10-CM

## 2025-05-05 DIAGNOSIS — N18.31 STAGE 3A CHRONIC KIDNEY DISEASE (HCC): ICD-10-CM

## 2025-05-05 DIAGNOSIS — M48.061 LUMBAR FORAMINAL STENOSIS: ICD-10-CM

## 2025-05-05 DIAGNOSIS — E78.5 HYPERLIPIDEMIA, UNSPECIFIED HYPERLIPIDEMIA TYPE: ICD-10-CM

## 2025-05-05 DIAGNOSIS — I10 ESSENTIAL HYPERTENSION: Primary | ICD-10-CM

## 2025-05-05 DIAGNOSIS — L60.8 NAIL DEFORMITY: ICD-10-CM

## 2025-05-05 DIAGNOSIS — Z12.31 ENCOUNTER FOR SCREENING MAMMOGRAM FOR BREAST CANCER: ICD-10-CM

## 2025-05-05 PROCEDURE — 99214 OFFICE O/P EST MOD 30 MIN: CPT | Performed by: NURSE PRACTITIONER

## 2025-05-05 PROCEDURE — 93000 ELECTROCARDIOGRAM COMPLETE: CPT | Performed by: NURSE PRACTITIONER

## 2025-05-05 PROCEDURE — G2211 COMPLEX E/M VISIT ADD ON: HCPCS | Performed by: NURSE PRACTITIONER

## 2025-05-05 RX ORDER — CITALOPRAM HYDROBROMIDE 10 MG/1
10 TABLET ORAL DAILY
Qty: 90 TABLET | Refills: 0 | Status: SHIPPED | OUTPATIENT
Start: 2025-05-05

## 2025-05-05 RX ORDER — MELOXICAM 7.5 MG/1
7.5 TABLET ORAL DAILY PRN
Qty: 60 TABLET | Refills: 0 | Status: SHIPPED | OUTPATIENT
Start: 2025-05-05

## 2025-05-05 NOTE — ASSESSMENT & PLAN NOTE
Orders:    meloxicam (MOBIC) 7.5 mg tablet; Take 1 tablet (7.5 mg total) by mouth daily as needed for moderate pain

## 2025-05-05 NOTE — ASSESSMENT & PLAN NOTE
Lab Results   Component Value Date    EGFR 57 05/02/2025    EGFR 64 10/23/2024    EGFR 67 04/26/2024    CREATININE 0.94 05/02/2025    CREATININE 0.86 10/23/2024    CREATININE 0.83 04/26/2024   Remains stable  Will trial meloxicam: 7.5 as needed; may increase to 15 if ineffective  Recheck CMP 3 months    Orders:    Comprehensive metabolic panel; Future

## 2025-05-05 NOTE — ASSESSMENT & PLAN NOTE
Recent lipid panel reviewed  Total 206;   She is currently taking pravastatin 10 5 times weekly  She is tolerating well without side effect, so will increase to daily  Recheck lipid panel with next labs

## 2025-05-05 NOTE — ASSESSMENT & PLAN NOTE
BP well-controlled on low-dose lisinopril (2.5 mg daily)  /82 today  Asymptomatic from a cardiovascular standpoint  Encourage periodic home pressure checks

## 2025-05-06 ENCOUNTER — OFFICE VISIT (OUTPATIENT)
Dept: PHYSICAL THERAPY | Facility: CLINIC | Age: 80
End: 2025-05-06
Attending: NURSE PRACTITIONER
Payer: MEDICARE

## 2025-05-06 DIAGNOSIS — M48.061 LUMBAR FORAMINAL STENOSIS: Primary | ICD-10-CM

## 2025-05-06 PROCEDURE — 97140 MANUAL THERAPY 1/> REGIONS: CPT

## 2025-05-06 PROCEDURE — 97112 NEUROMUSCULAR REEDUCATION: CPT

## 2025-05-06 PROCEDURE — 97110 THERAPEUTIC EXERCISES: CPT

## 2025-05-06 NOTE — PROGRESS NOTES
Daily Note     Today's date: 2025  Patient name: Kaila Mcgregor  : 1945  MRN: [id number]  Referring provider: Nina Steiner DO  Dx:   Encounter Diagnosis     ICD-10-CM    1. Lumbar foraminal stenosis  M48.061                      Subjective: Pt states that she is feeling about the same without much changes in her symptoms in her L leg. States that with certain, quicker motions, she gets numbness in her L buttock and top of her L foot.       Objective: See treatment diary below      Assessment: Tolerated treatment fair. Continues to report symptom reproduction with flexion based activities and a little better radicular symptoms with extension. Updated HEP to continue to improve proximal LE strength and core activation. Not limited with participation due to pain, but reports a constant, low level achy and tingling throughout session. Patient demonstrated fatigue post treatment and would benefit from continued PT      Plan: Continue per plan of care. Will follow up with patient following upcoming followup with spine specialist.      Precautions:   Patient Active Problem List   Diagnosis    Essential hypertension    Hyperlipidemia    Generalized osteoarthritis    Sciatica    Seasonal allergies    Chondromalacia patellae    Ankylosis of knee joint    Achilles tendinitis    Achilles bursitis    Upper respiratory tract infection    Stage 3a chronic kidney disease (HCC)    Enlarged lymph node in neck    Chronic pain of right knee    Elevated bilirubin    Herpes labialis    Nail deformity       Daily Treatment Diary    Date          FOTO IE -             Re-Eval IE               Manuals    (L) Hip PROM  JM JM JM         Lateral hip mobs   JM - grade 2                                    Neuro Re-Ed     Lumbar extension at wall 2x10 Pablo 10x    W/ OP 10x           Seated sciatic nerve floss 2x10 2x10           Supine sciatic nerve glide 2x10  2x10  2x10         Bird dog   LE only 2x10 (B) HEP      "    Fire hydrant   OTB 2x10 (B) HEP         Side plank    Modified 15\" hold 3x (B)          Pallof press   5.5 15x ea direction 5.5 15x ea direction                      Ther Ex    Bike - ROM   5 mins L1 TM 4:30 mins          Seated piriformis stretch 20\" hold 5x   Manual          Reverse Clamshell    2x15         Prone hip IR/ER    1:30 mins (L)         SLR  3x8 (B) HEP          Bridge with TrA  2x10  HEP          Clamshell with TB  [place] TB 3x10  HEP    Side lying 2x10 (L)          Standing hip abd and ext  Ext OTB 2x10   Prone hip ext 2x10 (L)         Leg press  75# 3x10                                                               Ther Activity                              Gait Training                              Modalities                                           "

## 2025-05-14 ENCOUNTER — OFFICE VISIT (OUTPATIENT)
Dept: PAIN MEDICINE | Facility: MEDICAL CENTER | Age: 80
End: 2025-05-14
Payer: MEDICARE

## 2025-05-14 VITALS — HEIGHT: 61 IN | BODY MASS INDEX: 21.52 KG/M2 | WEIGHT: 114 LBS

## 2025-05-14 DIAGNOSIS — M48.061 LUMBAR FORAMINAL STENOSIS: ICD-10-CM

## 2025-05-14 DIAGNOSIS — M71.38 CYST OF LUMBAR FACET JOINT: Primary | ICD-10-CM

## 2025-05-14 DIAGNOSIS — M54.16 LUMBAR RADICULITIS: ICD-10-CM

## 2025-05-14 PROCEDURE — 99214 OFFICE O/P EST MOD 30 MIN: CPT

## 2025-05-14 PROCEDURE — G2211 COMPLEX E/M VISIT ADD ON: HCPCS

## 2025-05-14 NOTE — PROGRESS NOTES
Assessment:  1. Cyst of lumbar facet joint    2. Lumbar radiculitis    3. Lumbar foraminal stenosis        Plan:  Can consider lumbar epidural steroid injection.  Patient states she would like to hold off on any further interventional management and medication management at this time.    Continue attending physical therapy sessions as discussed.    Continue the use of meloxicam as prescribed by PCP.    Follow-up as needed.    My impressions and treatment recommendations were discussed in detail with the patient who verbalized understanding and had no further questions.  Discharge instructions were provided. I personally saw and examined the patient and I agree with the above discussed plan of care.    No orders of the defined types were placed in this encounter.    No orders of the defined types were placed in this encounter.      History of Present Illness:  Kaila Mcgregor is a 79 y.o. female who presents for a follow up office visit after undergoing left L5-S1 TFESI on 4/16/2025.  Patient reports experiencing 0% symptom relief following most recent injection.  She continues to note left sided low back pain.  She describes her pain as an intermittent dull, aching, and pins-and-needles sensation.  Patient rates her pain today 4/10 on a numeric rating scale.  Admits radiating pain to the left buttock.  Admits intermittent numbness and tingling along the left foot.  Denies weakness of bilateral lower extremities.  Admits to the use of naproxen for symptom management.  Patient states she was recently prescribed meloxicam by her PCP but has yet to take this medication.    Patient states she has recently been attending physical therapy and performing a home exercise program.  She notes that she is very active and involved with hiking, pickleball, aqua aerobics, and walking.    I have personally reviewed and/or updated the patient's past medical history, past surgical history, family history, social history, current  medications, allergies, and vital signs today.     Review of Systems   Respiratory:  Negative for shortness of breath.    Cardiovascular:  Negative for chest pain.   Gastrointestinal:  Negative for constipation, diarrhea, nausea and vomiting.   Musculoskeletal:  Positive for arthralgias, back pain, gait problem and joint swelling. Negative for myalgias.   Skin:  Negative for rash.   Neurological:  Negative for dizziness, seizures and weakness.   All other systems reviewed and are negative.      Problem List[1]    Past Medical History:   Diagnosis Date    Allergic 35 yrs ago    Arthritis     Chronic kidney disease     HL (hearing loss) wear HA's    Hyperlipidemia     Hypertension     Osteoarthritis     Peripheral neuropathy        Past Surgical History:   Procedure Laterality Date    BLADDER SURGERY      LAST ASSESSED [id number]    COLONOSCOPY      DILATION AND CURETTAGE OF UTERUS      LAST ASSESSED [id number]    EYE SURGERY  cataracts    2022    TOOTH EXTRACTION      LAST ASSESSED [id number]       Family History   Problem Relation Age of Onset    Dementia Mother     Hypertension Mother     Skin cancer Mother         age unknown    Hearing loss Mother     Vision loss Mother     No Known Problems Father     No Known Problems Daughter     Depression Daughter     No Known Problems Maternal Grandmother     No Known Problems Maternal Grandfather     No Known Problems Paternal Grandmother     No Known Problems Paternal Grandfather     No Known Problems Paternal Aunt        Social History     Occupational History    Not on file   Tobacco Use    Smoking status: Never     Passive exposure: Never    Smokeless tobacco: Never   Vaping Use    Vaping status: Never Used   Substance and Sexual Activity    Alcohol use: Yes     Alcohol/week: 5.0 standard drinks of alcohol     Types: 3 Glasses of wine, 2 Standard drinks or equivalent per week     Comment: social     Drug use: Never    Sexual activity: Yes     Partners: Male     Birth  "control/protection: Post-menopausal       Current Outpatient Medications on File Prior to Visit   Medication Sig    Calcium Carbonate-Vitamin D3 600-400 MG-UNIT TABS Take by mouth    Cholecalciferol 25 MCG (1000 UT) tablet Take 1,000 Units by mouth daily    citalopram (CeleXA) 10 mg tablet Take 1 tablet (10 mg total) by mouth daily    estradiol (ESTRACE) 0.1 mg/g vaginal cream Insert 0.5 g into the vagina 2 (two) times a week    fluticasone (FLONASE) 50 mcg/act nasal spray 1 spray into each nostril if needed for rhinitis    ibuprofen (MOTRIN) 200 mg tablet Take 200 mg by mouth if needed    lisinopril (ZESTRIL) 2.5 mg tablet TAKE 1 TABLET BY MOUTH EVERY DAY    Lutein 20 MG CAPS Take by mouth    meloxicam (MOBIC) 7.5 mg tablet Take 1 tablet (7.5 mg total) by mouth daily as needed for moderate pain    Polyethyl Glycol-Propyl Glycol (SYSTANE OP) Apply to eye    pravastatin (PRAVACHOL) 10 mg tablet TAKE 1 TABLET ONCE DAILY 5 DAYS A WEEK     No current facility-administered medications on file prior to visit.       Allergies[2]    Physical Exam:    Ht 5' 1\" (1.549 m)   Wt 51.7 kg (114 lb)   BMI 21.54 kg/m²     Constitutional:normal, well developed, well nourished, alert, in no distress and non-toxic and no overt pain behavior.  Eyes:anicteric  HEENT:grossly intact  Neck:supple, symmetric, trachea midline and no masses   Pulmonary:even and unlabored  Cardiovascular:No edema or pitting edema present  Skin:Normal without rashes or lesions and well hydrated  Psychiatric:Mood and affect appropriate  Neurologic:Cranial Nerves II-XII grossly intact, bilateral lower extremity strength is normal, positive seated straight leg raise on the left side  Musculoskeletal:normal    Imaging         [1]   Patient Active Problem List  Diagnosis    Essential hypertension    Hyperlipidemia    Generalized osteoarthritis    Sciatica    Seasonal allergies    Chondromalacia patellae    Ankylosis of knee joint    Achilles tendinitis    Achilles " bursitis    Upper respiratory tract infection    Stage 3a chronic kidney disease (HCC)    Enlarged lymph node in neck    Chronic pain of right knee    Elevated bilirubin    Herpes labialis    Nail deformity    Lumbar radiculitis   [2]   Allergies  Allergen Reactions    Shellfish Allergy - Food Allergy GI Intolerance     Mollusks/clams/oysters    Erythromycin Rash

## 2025-05-20 ENCOUNTER — TELEPHONE (OUTPATIENT)
Age: 80
End: 2025-05-20

## 2025-05-20 NOTE — TELEPHONE ENCOUNTER
Contacted Pt. in regards to ROUTINE Referral, LVM to contact [phone] to discuss services needed at this time in order to be added to proper wait list.

## 2025-05-22 ENCOUNTER — NURSE TRIAGE (OUTPATIENT)
Age: 80
End: 2025-05-22

## 2025-05-22 NOTE — TELEPHONE ENCOUNTER
"FOLLOW UP: Please call back to patient to advise.     REASON FOR CONVERSATION: Medication Reaction    SYMPTOMS: States that she has been having frequent diarrhea with urgency several times daily since starting  on Celexa 5/6.    OTHER: Patient would like to stop taking this medication and would like a call back to advise if she needs to wean off of it.  She did take her dose this morning.    DISPOSITION: Callback by PCP Today    Reason for Disposition   Caller has NON-URGENT medicine question about med that PCP or specialist prescribed and triager unable to answer question    Answer Assessment - Initial Assessment Questions  1. NAME of MEDICINE: \"What medicine(s) are you calling about?\"      Celexa  2. QUESTION: \"What is your question?\" (e.g., double dose of medicine, side effect)      Having reaction to medication - patient would like to stop medication  3. PRESCRIBER: \"Who prescribed the medicine?\" Reason: if prescribed by specialist, call should be referred to that group.      Selma Richey  4. SYMPTOMS: \"Do you have any symptoms?\" If Yes, ask: \"What symptoms are you having?\"  \"How bad are the symptoms (e.g., mild, moderate, severe)      Frequent diarrhea with urgency several times a day, \"Stomach twinges\".    Protocols used: Medication Question Call-Adult-[place]    "

## 2025-05-22 NOTE — TELEPHONE ENCOUNTER
Please call patient.  She has only been on the medication about 2 weeks.  She is fine to stop-no need to wean.  Please have her follow-up if she does change her mind and wish to start/try an alternate medication

## 2025-05-22 NOTE — TELEPHONE ENCOUNTER
Called Pt. and she states she does not know if she wants to start a trial of an alternative SSRI, she is hesitant due to possible side effects. The diarrhea is too severe to stay on. She would like to wean off of the Celexa. Are you able to my chart message her how to properly do so? Thanks!

## 2025-05-28 ENCOUNTER — HOSPITAL ENCOUNTER (OUTPATIENT)
Dept: NON INVASIVE DIAGNOSTICS | Facility: HOSPITAL | Age: 80
Discharge: HOME | End: 2025-05-28
Attending: NURSE PRACTITIONER
Payer: MEDICARE

## 2025-05-28 DIAGNOSIS — R00.1 BRADYCARDIA: ICD-10-CM

## 2025-05-28 PROCEDURE — 93226 XTRNL ECG REC<48 HR SCAN A/R: CPT

## 2025-05-28 PROCEDURE — 93225 XTRNL ECG REC<48 HRS REC: CPT

## 2025-06-02 ENCOUNTER — OFFICE VISIT (OUTPATIENT)
Dept: FAMILY MEDICINE CLINIC | Facility: CLINIC | Age: 80
End: 2025-06-02
Payer: MEDICARE

## 2025-06-02 ENCOUNTER — TELEPHONE (OUTPATIENT)
Age: 80
End: 2025-06-02

## 2025-06-02 VITALS
OXYGEN SATURATION: 99 % | WEIGHT: 113.4 LBS | TEMPERATURE: 98 F | HEIGHT: 61 IN | BODY MASS INDEX: 21.41 KG/M2 | HEART RATE: 54 BPM | SYSTOLIC BLOOD PRESSURE: 128 MMHG | DIASTOLIC BLOOD PRESSURE: 74 MMHG

## 2025-06-02 DIAGNOSIS — F32.A DEPRESSION, UNSPECIFIED DEPRESSION TYPE: Primary | ICD-10-CM

## 2025-06-02 DIAGNOSIS — R00.1 BRADYCARDIA: ICD-10-CM

## 2025-06-02 DIAGNOSIS — Z12.31 ENCOUNTER FOR SCREENING MAMMOGRAM FOR BREAST CANCER: ICD-10-CM

## 2025-06-02 PROCEDURE — G2211 COMPLEX E/M VISIT ADD ON: HCPCS | Performed by: NURSE PRACTITIONER

## 2025-06-02 PROCEDURE — 99214 OFFICE O/P EST MOD 30 MIN: CPT | Performed by: NURSE PRACTITIONER

## 2025-06-02 RX ORDER — PRAVASTATIN SODIUM 10 MG
10 TABLET ORAL
COMMUNITY

## 2025-06-02 NOTE — PROGRESS NOTES
Name: Kaila Mcgregor      : 1945      MRN: [id number]  Encounter Provider: TAHMINA Ba  Encounter Date: 2025   Encounter department: [facility]  :  Assessment & Plan  Encounter for screening mammogram for breast cancer  Due in July    Orders:    Mammo screening bilateral w 3d and cad; Future    Bradycardia  Patient remains asymptomatic  Completed Holter monitor-results not available yet  Cardiology appointment scheduled for        Depression, unspecified depression type  Reports unable to tolerate Celexa due to diarrhea  Symptoms resolved once she stopped the medication  She declines trialing alternate SSRI and/or other medications at this time  Feels her depression is more related to health conditions-joint pain and slowed ability to continue her daily, active lifestyle  Resources given today for potential therapy  Patient to consider and will follow-up with any further concerns    Supportive friend/family   no SI/HI  ER precaution with any acute mental health change                History of Present Illness   Mental health follow-up  Stopped SSRI due to profound diarrhea.  Symptoms resolved when she stopped the medication.  Reports still with some intermittent depression, but does not wish to start or trial any other medication at this time.  Supportive friends and family.  Symptoms are more related to healthcare frustration-joint pain, decreased ability to participate in some of her active lifestyle.  Following with pain management-considering second injection.  Is agreeable to potential talk therapy.  Completed Holter-returned on Thursday.  Remains asymptomatic from a cardiovascular standpoint      Review of Systems   Constitutional: Negative.    Respiratory: Negative.     Cardiovascular: Negative.    Neurological: Negative.    Psychiatric/Behavioral: Negative.  Negative for self-injury, sleep disturbance and suicidal ideas. The patient is not nervous/anxious.  "       Objective   /74 (BP Location: Left arm, Patient Position: Sitting, Cuff Size: Adult)   Pulse (!) 54   Temp 98 °F (36.7 °C)   Ht 5' 1\" (1.549 m)   Wt 51.4 kg (113 lb 6.4 oz)   SpO2 99%   BMI 21.43 kg/m²      Physical Exam  Vitals and nursing note reviewed.   Constitutional:       General: She is not in acute distress.     Appearance: Normal appearance. She is well-developed. She is not ill-appearing.   Pulmonary:      Effort: Pulmonary effort is normal. No respiratory distress.     Neurological:      Mental Status: She is alert and oriented to person, place, and time.     Psychiatric:         Attention and Perception: Attention normal.         Mood and Affect: Mood normal.         Behavior: Behavior normal.         "

## 2025-06-02 NOTE — TELEPHONE ENCOUNTER
Patient has been added to the Talk Therapy wait list with a referral.    Insurance: Medicare A and B, [employer]  Insurance Type:    Commercial []   Medicaid []   [facility] (if applicable) [place] [employer] [x]  Location Preference: [facility]  Provider Preference: Open to both male and female  Virtual: Yes [x] No []  Were outside resources sent: Yes [x] No []    **Please mail out the outside resource packet to the confirmed address in the chart.**    PC: Referral for depression and has had physical side effects to the medications for behavioral health, would only like talk therapy.

## 2025-06-05 PROCEDURE — 93227 XTRNL ECG REC<48 HR R&I: CPT

## 2025-06-07 ENCOUNTER — RESULTS FOLLOW-UP (OUTPATIENT)
Dept: FAMILY MEDICINE CLINIC | Facility: CLINIC | Age: 80
End: 2025-06-07

## 2025-08-22 ENCOUNTER — EVALUATION (OUTPATIENT)
Dept: PHYSICAL THERAPY | Facility: MEDICAL CENTER | Age: 80
End: 2025-08-22
Attending: PODIATRIST
Payer: MEDICARE

## 2025-08-22 DIAGNOSIS — M79.671 CHRONIC HEEL PAIN, RIGHT: Primary | ICD-10-CM

## 2025-08-22 DIAGNOSIS — G89.29 CHRONIC HEEL PAIN, RIGHT: Primary | ICD-10-CM

## 2025-08-22 PROCEDURE — 97161 PT EVAL LOW COMPLEX 20 MIN: CPT | Performed by: PHYSICAL MEDICINE & REHABILITATION

## 2025-08-22 PROCEDURE — 97112 NEUROMUSCULAR REEDUCATION: CPT | Performed by: PHYSICAL MEDICINE & REHABILITATION
